# Patient Record
Sex: FEMALE | Race: WHITE | Employment: FULL TIME | URBAN - METROPOLITAN AREA
[De-identification: names, ages, dates, MRNs, and addresses within clinical notes are randomized per-mention and may not be internally consistent; named-entity substitution may affect disease eponyms.]

---

## 2017-05-23 ENCOUNTER — HOSPITAL ENCOUNTER (OUTPATIENT)
Age: 27
Discharge: HOME | End: 2017-05-23
Payer: COMMERCIAL

## 2017-05-23 ENCOUNTER — HOSPITAL ENCOUNTER (OUTPATIENT)
Dept: CT IMAGING | Age: 27
Discharge: HOME | End: 2017-05-23
Payer: COMMERCIAL

## 2017-05-23 DIAGNOSIS — Z86.19 HISTORY OF SEPSIS: ICD-10-CM

## 2017-05-23 DIAGNOSIS — R10.84 GENERALIZED ABDOMINAL PAIN: ICD-10-CM

## 2017-05-23 LAB
ALBUMIN SERPL-MCNC: 4.3 G/DL (ref 3.5–5.2)
ALBUMIN/GLOBULIN RATIO: ABNORMAL (ref 1–2.5)
ALP BLD-CCNC: 130 U/L (ref 35–104)
ALT SERPL-CCNC: 15 U/L (ref 5–33)
AMYLASE: 64 U/L (ref 28–100)
ANION GAP SERPL CALCULATED.3IONS-SCNC: 15 MMOL/L (ref 9–17)
AST SERPL-CCNC: 14 U/L
BILIRUB SERPL-MCNC: 0.4 MG/DL (ref 0.3–1.2)
BUN BLDV-MCNC: 12 MG/DL (ref 6–20)
BUN/CREAT BLD: ABNORMAL (ref 9–20)
CALCIUM SERPL-MCNC: 9 MG/DL (ref 8.6–10.4)
CHLORIDE BLD-SCNC: 98 MMOL/L (ref 98–107)
CO2: 23 MMOL/L (ref 20–31)
CREAT SERPL-MCNC: 0.79 MG/DL (ref 0.5–0.9)
GFR AFRICAN AMERICAN: >60 ML/MIN
GFR NON-AFRICAN AMERICAN: >60 ML/MIN
GFR SERPL CREATININE-BSD FRML MDRD: ABNORMAL ML/MIN/{1.73_M2}
GFR SERPL CREATININE-BSD FRML MDRD: ABNORMAL ML/MIN/{1.73_M2}
GLUCOSE BLD-MCNC: 113 MG/DL (ref 70–99)
HCT VFR BLD CALC: 39.4 % (ref 36–46)
HEMOGLOBIN: 13.1 G/DL (ref 12–16)
LIPASE: 68 U/L (ref 13–60)
MCH RBC QN AUTO: 27.8 PG (ref 26–34)
MCHC RBC AUTO-ENTMCNC: 33.3 G/DL (ref 31–37)
MCV RBC AUTO: 83.5 FL (ref 80–100)
PDW BLD-RTO: 13.4 % (ref 11.5–14.9)
PLATELET # BLD: 198 K/UL (ref 150–450)
PMV BLD AUTO: 7.4 FL (ref 6–12)
POTASSIUM SERPL-SCNC: 3.6 MMOL/L (ref 3.7–5.3)
RBC # BLD: 4.72 M/UL (ref 4–5.2)
SODIUM BLD-SCNC: 136 MMOL/L (ref 135–144)
TOTAL PROTEIN: 7.5 G/DL (ref 6.4–8.3)
WBC # BLD: 9 K/UL (ref 3.5–11)

## 2017-05-23 PROCEDURE — 85027 COMPLETE CBC AUTOMATED: CPT

## 2017-05-23 PROCEDURE — 6360000004 HC RX CONTRAST MEDICATION: Performed by: FAMILY MEDICINE

## 2017-05-23 PROCEDURE — 80053 COMPREHEN METABOLIC PANEL: CPT

## 2017-05-23 PROCEDURE — 2580000003 HC RX 258: Performed by: FAMILY MEDICINE

## 2017-05-23 PROCEDURE — 74177 CT ABD & PELVIS W/CONTRAST: CPT

## 2017-05-23 PROCEDURE — 83690 ASSAY OF LIPASE: CPT

## 2017-05-23 PROCEDURE — 82150 ASSAY OF AMYLASE: CPT

## 2017-05-23 PROCEDURE — 36415 COLL VENOUS BLD VENIPUNCTURE: CPT

## 2017-05-23 RX ORDER — 0.9 % SODIUM CHLORIDE 0.9 %
100 INTRAVENOUS SOLUTION INTRAVENOUS ONCE
Status: COMPLETED | OUTPATIENT
Start: 2017-05-23 | End: 2017-05-23

## 2017-05-23 RX ORDER — SODIUM CHLORIDE 0.9 % (FLUSH) 0.9 %
10 SYRINGE (ML) INJECTION PRN
Status: DISCONTINUED | OUTPATIENT
Start: 2017-05-23 | End: 2017-05-26 | Stop reason: HOSPADM

## 2017-05-23 RX ADMIN — Medication 10 ML: at 14:56

## 2017-05-23 RX ADMIN — SODIUM CHLORIDE 100 ML: 9 INJECTION, SOLUTION INTRAVENOUS at 14:56

## 2017-05-23 RX ADMIN — IOHEXOL 50 ML: 240 INJECTION, SOLUTION INTRATHECAL; INTRAVASCULAR; INTRAVENOUS; ORAL at 14:56

## 2017-05-23 RX ADMIN — IOVERSOL 130 ML: 741 INJECTION INTRA-ARTERIAL; INTRAVENOUS at 14:56

## 2017-06-15 PROBLEM — E87.6 HYPOKALEMIA: Status: ACTIVE | Noted: 2017-06-15

## 2017-06-15 PROBLEM — R73.9 HYPERGLYCEMIA: Status: ACTIVE | Noted: 2017-06-15

## 2017-07-25 PROBLEM — M54.40 CHRONIC BILATERAL LOW BACK PAIN WITH SCIATICA: Status: ACTIVE | Noted: 2017-07-25

## 2017-07-25 PROBLEM — G89.29 CHRONIC BILATERAL LOW BACK PAIN WITH SCIATICA: Status: ACTIVE | Noted: 2017-07-25

## 2017-07-25 PROBLEM — R10.84 GENERALIZED ABDOMINAL PAIN: Status: ACTIVE | Noted: 2017-07-25

## 2017-08-03 ENCOUNTER — HOSPITAL ENCOUNTER (EMERGENCY)
Age: 27
Discharge: HOME | End: 2017-08-04
Attending: EMERGENCY MEDICINE
Payer: COMMERCIAL

## 2017-08-03 ENCOUNTER — APPOINTMENT (OUTPATIENT)
Dept: GENERAL RADIOLOGY | Age: 27
End: 2017-08-03
Payer: COMMERCIAL

## 2017-08-03 ENCOUNTER — APPOINTMENT (OUTPATIENT)
Dept: CT IMAGING | Age: 27
End: 2017-08-03
Payer: COMMERCIAL

## 2017-08-03 DIAGNOSIS — R51.9 ACUTE NONINTRACTABLE HEADACHE, UNSPECIFIED HEADACHE TYPE: Primary | ICD-10-CM

## 2017-08-03 DIAGNOSIS — R10.9 ABDOMINAL PAIN, UNSPECIFIED LOCATION: ICD-10-CM

## 2017-08-03 DIAGNOSIS — R07.9 CHEST PAIN, UNSPECIFIED TYPE: ICD-10-CM

## 2017-08-03 LAB
ABSOLUTE EOS #: 0.1 K/UL (ref 0–0.4)
ABSOLUTE LYMPH #: 2.8 K/UL (ref 1–4.8)
ABSOLUTE MONO #: 0.5 K/UL (ref 0.1–1.3)
ALBUMIN SERPL-MCNC: 4.2 G/DL (ref 3.5–5.2)
ALBUMIN/GLOBULIN RATIO: ABNORMAL (ref 1–2.5)
ALP BLD-CCNC: 118 U/L (ref 35–104)
ALT SERPL-CCNC: 19 U/L (ref 5–33)
ANION GAP SERPL CALCULATED.3IONS-SCNC: 14 MMOL/L (ref 9–17)
AST SERPL-CCNC: 15 U/L
BASOPHILS # BLD: 1 %
BASOPHILS ABSOLUTE: 0.1 K/UL (ref 0–0.2)
BILIRUB SERPL-MCNC: 0.22 MG/DL (ref 0.3–1.2)
BILIRUBIN DIRECT: <0.08 MG/DL
BILIRUBIN URINE: NEGATIVE
BILIRUBIN, INDIRECT: ABNORMAL MG/DL (ref 0–1)
BUN BLDV-MCNC: 17 MG/DL (ref 6–20)
BUN/CREAT BLD: NORMAL (ref 9–20)
CALCIUM SERPL-MCNC: 9.3 MG/DL (ref 8.6–10.4)
CHLORIDE BLD-SCNC: 99 MMOL/L (ref 98–107)
CO2: 26 MMOL/L (ref 20–31)
COLOR: YELLOW
COMMENT UA: NORMAL
CREAT SERPL-MCNC: 0.83 MG/DL (ref 0.5–0.9)
DIFFERENTIAL TYPE: NORMAL
EOSINOPHILS RELATIVE PERCENT: 2 %
GFR AFRICAN AMERICAN: >60 ML/MIN
GFR NON-AFRICAN AMERICAN: >60 ML/MIN
GFR SERPL CREATININE-BSD FRML MDRD: NORMAL ML/MIN/{1.73_M2}
GFR SERPL CREATININE-BSD FRML MDRD: NORMAL ML/MIN/{1.73_M2}
GLOBULIN: ABNORMAL G/DL (ref 1.5–3.8)
GLUCOSE BLD-MCNC: 92 MG/DL (ref 70–99)
GLUCOSE URINE: NEGATIVE
HCT VFR BLD CALC: 38.7 % (ref 36–46)
HEMOGLOBIN: 13.2 G/DL (ref 12–16)
KETONES, URINE: NEGATIVE
LEUKOCYTE ESTERASE, URINE: NEGATIVE
LIPASE: 40 U/L (ref 13–60)
LYMPHOCYTES # BLD: 33 %
MCH RBC QN AUTO: 28.5 PG (ref 26–34)
MCHC RBC AUTO-ENTMCNC: 34.1 G/DL (ref 31–37)
MCV RBC AUTO: 83.8 FL (ref 80–100)
MONOCYTES # BLD: 5 %
NITRITE, URINE: NEGATIVE
PDW BLD-RTO: 13.2 % (ref 11.5–14.9)
PH UA: 5.5 (ref 5–8)
PLATELET # BLD: 234 K/UL (ref 150–450)
PLATELET ESTIMATE: NORMAL
PMV BLD AUTO: 8.2 FL (ref 6–12)
POTASSIUM SERPL-SCNC: 4 MMOL/L (ref 3.7–5.3)
PROTEIN UA: NEGATIVE
RBC # BLD: 4.61 M/UL (ref 4–5.2)
RBC # BLD: NORMAL 10*6/UL
SEG NEUTROPHILS: 59 %
SEGMENTED NEUTROPHILS ABSOLUTE COUNT: 5.2 K/UL (ref 1.3–9.1)
SODIUM BLD-SCNC: 139 MMOL/L (ref 135–144)
SPECIFIC GRAVITY UA: 1.01 (ref 1–1.03)
TOTAL PROTEIN: 7.4 G/DL (ref 6.4–8.3)
TROPONIN INTERP: NORMAL
TROPONIN T: <0.03 NG/ML
TURBIDITY: CLEAR
URINE HGB: NEGATIVE
UROBILINOGEN, URINE: NORMAL
WBC # BLD: 8.7 K/UL (ref 3.5–11)
WBC # BLD: NORMAL 10*3/UL

## 2017-08-03 PROCEDURE — 81003 URINALYSIS AUTO W/O SCOPE: CPT

## 2017-08-03 PROCEDURE — 82945 GLUCOSE OTHER FLUID: CPT

## 2017-08-03 PROCEDURE — 89050 BODY FLUID CELL COUNT: CPT

## 2017-08-03 PROCEDURE — 62270 DX LMBR SPI PNXR: CPT

## 2017-08-03 PROCEDURE — 84157 ASSAY OF PROTEIN OTHER: CPT

## 2017-08-03 PROCEDURE — 71020 XR CHEST STANDARD TWO VW: CPT

## 2017-08-03 PROCEDURE — 80076 HEPATIC FUNCTION PANEL: CPT

## 2017-08-03 PROCEDURE — 70450 CT HEAD/BRAIN W/O DYE: CPT

## 2017-08-03 PROCEDURE — 87205 SMEAR GRAM STAIN: CPT

## 2017-08-03 PROCEDURE — 93005 ELECTROCARDIOGRAM TRACING: CPT

## 2017-08-03 PROCEDURE — 99285 EMERGENCY DEPT VISIT HI MDM: CPT

## 2017-08-03 PROCEDURE — 87070 CULTURE OTHR SPECIMN AEROBIC: CPT

## 2017-08-03 PROCEDURE — 84484 ASSAY OF TROPONIN QUANT: CPT

## 2017-08-03 PROCEDURE — 36415 COLL VENOUS BLD VENIPUNCTURE: CPT

## 2017-08-03 PROCEDURE — 85025 COMPLETE CBC W/AUTO DIFF WBC: CPT

## 2017-08-03 PROCEDURE — 87086 URINE CULTURE/COLONY COUNT: CPT

## 2017-08-03 PROCEDURE — 80048 BASIC METABOLIC PNL TOTAL CA: CPT

## 2017-08-03 PROCEDURE — 2580000003 HC RX 258: Performed by: EMERGENCY MEDICINE

## 2017-08-03 PROCEDURE — 74000 XR ABDOMEN LIMITED (KUB): CPT

## 2017-08-03 PROCEDURE — 96375 TX/PRO/DX INJ NEW DRUG ADDON: CPT

## 2017-08-03 PROCEDURE — 6360000002 HC RX W HCPCS: Performed by: EMERGENCY MEDICINE

## 2017-08-03 PROCEDURE — 83690 ASSAY OF LIPASE: CPT

## 2017-08-03 RX ORDER — METOCLOPRAMIDE HYDROCHLORIDE 5 MG/ML
10 INJECTION INTRAMUSCULAR; INTRAVENOUS ONCE
Status: COMPLETED | OUTPATIENT
Start: 2017-08-03 | End: 2017-08-03

## 2017-08-03 RX ORDER — DIPHENHYDRAMINE HYDROCHLORIDE 50 MG/ML
25 INJECTION INTRAMUSCULAR; INTRAVENOUS ONCE
Status: COMPLETED | OUTPATIENT
Start: 2017-08-03 | End: 2017-08-03

## 2017-08-03 RX ORDER — 0.9 % SODIUM CHLORIDE 0.9 %
1000 INTRAVENOUS SOLUTION INTRAVENOUS ONCE
Status: COMPLETED | OUTPATIENT
Start: 2017-08-03 | End: 2017-08-03

## 2017-08-03 RX ORDER — DEXAMETHASONE SODIUM PHOSPHATE 4 MG/ML
10 INJECTION, SOLUTION INTRA-ARTICULAR; INTRALESIONAL; INTRAMUSCULAR; INTRAVENOUS; SOFT TISSUE ONCE
Status: COMPLETED | OUTPATIENT
Start: 2017-08-03 | End: 2017-08-04

## 2017-08-03 RX ORDER — MORPHINE SULFATE 4 MG/ML
4 INJECTION, SOLUTION INTRAMUSCULAR; INTRAVENOUS ONCE
Status: COMPLETED | OUTPATIENT
Start: 2017-08-03 | End: 2017-08-03

## 2017-08-03 RX ADMIN — SODIUM CHLORIDE 1000 ML: 9 INJECTION, SOLUTION INTRAVENOUS at 21:40

## 2017-08-03 RX ADMIN — METOCLOPRAMIDE 10 MG: 5 INJECTION, SOLUTION INTRAMUSCULAR; INTRAVENOUS at 21:36

## 2017-08-03 RX ADMIN — MORPHINE SULFATE 4 MG: 4 INJECTION, SOLUTION INTRAMUSCULAR; INTRAVENOUS at 21:32

## 2017-08-03 RX ADMIN — DIPHENHYDRAMINE HYDROCHLORIDE 25 MG: 50 INJECTION, SOLUTION INTRAMUSCULAR; INTRAVENOUS at 21:36

## 2017-08-03 ASSESSMENT — PAIN SCALES - WONG BAKER: WONGBAKER_NUMERICALRESPONSE: 8

## 2017-08-03 ASSESSMENT — PAIN DESCRIPTION - PAIN TYPE: TYPE: ACUTE PAIN

## 2017-08-03 ASSESSMENT — PAIN DESCRIPTION - LOCATION: LOCATION: HEAD

## 2017-08-03 ASSESSMENT — PAIN SCALES - GENERAL
PAINLEVEL_OUTOF10: 9
PAINLEVEL_OUTOF10: 8

## 2017-08-04 VITALS
RESPIRATION RATE: 14 BRPM | OXYGEN SATURATION: 98 % | WEIGHT: 163 LBS | BODY MASS INDEX: 30 KG/M2 | SYSTOLIC BLOOD PRESSURE: 100 MMHG | DIASTOLIC BLOOD PRESSURE: 62 MMHG | HEART RATE: 103 BPM | TEMPERATURE: 98 F

## 2017-08-04 LAB
APPEARANCE CSF: COLORLESS
CULTURE: NORMAL
CULTURE: NORMAL
GLUCOSE, CSF: 55 MG/DL (ref 40–70)
Lab: NORMAL
PROTEIN CSF: 17.4 MG/DL (ref 15–45)
RBC CSF: 0 /MM3
SPECIMEN DESCRIPTION: NORMAL
SPECIMEN DESCRIPTION: NORMAL
STATUS: NORMAL
SUPERNAT COLOR CSF: CLEAR
TROPONIN INTERP: NORMAL
TROPONIN T: <0.03 NG/ML
TUBE NUMBER CSF: 3
VOLUME CSF: 4.5
WBC CSF: 1 /MM3
XANTHOCHROMIA: NORMAL

## 2017-08-04 PROCEDURE — 96365 THER/PROPH/DIAG IV INF INIT: CPT

## 2017-08-04 PROCEDURE — 6360000002 HC RX W HCPCS: Performed by: EMERGENCY MEDICINE

## 2017-08-04 PROCEDURE — 2580000003 HC RX 258: Performed by: EMERGENCY MEDICINE

## 2017-08-04 PROCEDURE — 96375 TX/PRO/DX INJ NEW DRUG ADDON: CPT

## 2017-08-04 RX ORDER — LORATADINE 10 MG/1
10 TABLET ORAL DAILY
Qty: 20 TABLET | Refills: 0 | Status: SHIPPED | OUTPATIENT
Start: 2017-08-04 | End: 2017-10-11 | Stop reason: ALTCHOICE

## 2017-08-04 RX ORDER — FAMOTIDINE 20 MG/1
20 TABLET, FILM COATED ORAL 2 TIMES DAILY
Qty: 20 TABLET | Refills: 0 | Status: SHIPPED | OUTPATIENT
Start: 2017-08-04 | End: 2017-08-29 | Stop reason: ALTCHOICE

## 2017-08-04 RX ORDER — METOCLOPRAMIDE 10 MG/1
10 TABLET ORAL 4 TIMES DAILY PRN
Qty: 30 TABLET | Refills: 0 | Status: SHIPPED | OUTPATIENT
Start: 2017-08-04 | End: 2017-08-29 | Stop reason: ALTCHOICE

## 2017-08-04 RX ADMIN — MEROPENEM 2 G: 1 INJECTION, POWDER, FOR SOLUTION INTRAVENOUS at 00:16

## 2017-08-04 RX ADMIN — DEXAMETHASONE SODIUM PHOSPHATE 10 MG: 4 INJECTION, SOLUTION INTRAMUSCULAR; INTRAVENOUS at 00:12

## 2017-08-05 ASSESSMENT — ENCOUNTER SYMPTOMS
CONSTIPATION: 0
BLOOD IN STOOL: 0
ABDOMINAL PAIN: 0
PHOTOPHOBIA: 1
DIARRHEA: 0
NAUSEA: 1
RHINORRHEA: 1
SHORTNESS OF BREATH: 0
VOMITING: 0
SORE THROAT: 0
COUGH: 0

## 2017-08-07 LAB
CULTURE: NORMAL
CULTURE: NORMAL
DIRECT EXAM: NORMAL
Lab: NORMAL
SPECIMEN DESCRIPTION: NORMAL
SPECIMEN DESCRIPTION: NORMAL
STATUS: NORMAL

## 2017-08-23 LAB
EKG ATRIAL RATE: 78 BPM
EKG P AXIS: 38 DEGREES
EKG P-R INTERVAL: 162 MS
EKG Q-T INTERVAL: 372 MS
EKG QRS DURATION: 86 MS
EKG QTC CALCULATION (BAZETT): 424 MS
EKG R AXIS: 12 DEGREES
EKG T AXIS: 30 DEGREES
EKG VENTRICULAR RATE: 78 BPM

## 2017-08-29 PROBLEM — M51.9 LUMBAR DISC DISEASE: Status: ACTIVE | Noted: 2017-08-29

## 2017-09-25 ENCOUNTER — HOSPITAL ENCOUNTER (OUTPATIENT)
Dept: ULTRASOUND IMAGING | Age: 27
Discharge: HOME | End: 2017-09-25
Payer: COMMERCIAL

## 2017-09-25 DIAGNOSIS — R10.84 GENERALIZED ABDOMINAL PAIN: ICD-10-CM

## 2017-09-25 PROCEDURE — 76705 ECHO EXAM OF ABDOMEN: CPT

## 2017-10-11 PROBLEM — R51.9 CHRONIC NONINTRACTABLE HEADACHE: Status: ACTIVE | Noted: 2017-10-11

## 2017-10-11 PROBLEM — G89.29 CHRONIC NONINTRACTABLE HEADACHE: Status: ACTIVE | Noted: 2017-10-11

## 2017-10-19 ENCOUNTER — HOSPITAL ENCOUNTER (OUTPATIENT)
Age: 27
Discharge: HOME | End: 2017-10-19
Payer: COMMERCIAL

## 2017-10-19 ENCOUNTER — HOSPITAL ENCOUNTER (OUTPATIENT)
Dept: MRI IMAGING | Age: 27
Discharge: HOME | End: 2017-10-19
Payer: COMMERCIAL

## 2017-10-19 DIAGNOSIS — G89.29 CHRONIC BILATERAL LOW BACK PAIN WITH SCIATICA, SCIATICA LATERALITY UNSPECIFIED: ICD-10-CM

## 2017-10-19 DIAGNOSIS — Z13.220 SCREENING CHOLESTEROL LEVEL: ICD-10-CM

## 2017-10-19 DIAGNOSIS — M54.16 LUMBAR RADICULOPATHY: ICD-10-CM

## 2017-10-19 DIAGNOSIS — M51.9 LUMBAR DISC DISEASE: ICD-10-CM

## 2017-10-19 DIAGNOSIS — M08.00 JUVENILE RHEUMATOID ARTHRITIS (HCC): ICD-10-CM

## 2017-10-19 DIAGNOSIS — R73.9 HYPERGLYCEMIA: ICD-10-CM

## 2017-10-19 DIAGNOSIS — M54.40 CHRONIC BILATERAL LOW BACK PAIN WITH SCIATICA, SCIATICA LATERALITY UNSPECIFIED: ICD-10-CM

## 2017-10-19 DIAGNOSIS — E87.6 HYPOKALEMIA: ICD-10-CM

## 2017-10-19 LAB
ALBUMIN SERPL-MCNC: 4.5 G/DL (ref 3.5–5.2)
ALBUMIN/GLOBULIN RATIO: NORMAL (ref 1–2.5)
ALP BLD-CCNC: 99 U/L (ref 35–104)
ALT SERPL-CCNC: 13 U/L (ref 5–33)
ANION GAP SERPL CALCULATED.3IONS-SCNC: 11 MMOL/L (ref 9–17)
AST SERPL-CCNC: 12 U/L
BILIRUB SERPL-MCNC: 0.33 MG/DL (ref 0.3–1.2)
BUN BLDV-MCNC: 16 MG/DL (ref 6–20)
BUN/CREAT BLD: NORMAL (ref 9–20)
CALCIUM SERPL-MCNC: 9.2 MG/DL (ref 8.6–10.4)
CHLORIDE BLD-SCNC: 104 MMOL/L (ref 98–107)
CHOLESTEROL/HDL RATIO: 3.4
CHOLESTEROL: 153 MG/DL
CO2: 24 MMOL/L (ref 20–31)
CREAT SERPL-MCNC: 0.85 MG/DL (ref 0.5–0.9)
GFR AFRICAN AMERICAN: >60 ML/MIN
GFR NON-AFRICAN AMERICAN: >60 ML/MIN
GFR SERPL CREATININE-BSD FRML MDRD: NORMAL ML/MIN/{1.73_M2}
GFR SERPL CREATININE-BSD FRML MDRD: NORMAL ML/MIN/{1.73_M2}
GLUCOSE BLD-MCNC: 96 MG/DL (ref 70–99)
HCT VFR BLD CALC: 42.7 % (ref 36–46)
HDLC SERPL-MCNC: 45 MG/DL
HEMOGLOBIN: 14.7 G/DL (ref 12–16)
LDL CHOLESTEROL: 87 MG/DL (ref 0–130)
MCH RBC QN AUTO: 29.1 PG (ref 26–34)
MCHC RBC AUTO-ENTMCNC: 34.4 G/DL (ref 31–37)
MCV RBC AUTO: 84.4 FL (ref 80–100)
PDW BLD-RTO: 13.6 % (ref 11.5–14.9)
PLATELET # BLD: 204 K/UL (ref 150–450)
PMV BLD AUTO: 7.6 FL (ref 6–12)
POTASSIUM SERPL-SCNC: 4.3 MMOL/L (ref 3.7–5.3)
RBC # BLD: 5.06 M/UL (ref 4–5.2)
SODIUM BLD-SCNC: 139 MMOL/L (ref 135–144)
TOTAL PROTEIN: 7.5 G/DL (ref 6.4–8.3)
TRIGL SERPL-MCNC: 106 MG/DL
VLDLC SERPL CALC-MCNC: NORMAL MG/DL (ref 1–30)
WBC # BLD: 7 K/UL (ref 3.5–11)

## 2017-10-19 PROCEDURE — 72148 MRI LUMBAR SPINE W/O DYE: CPT

## 2017-10-19 PROCEDURE — 80061 LIPID PANEL: CPT

## 2017-10-19 PROCEDURE — 85027 COMPLETE CBC AUTOMATED: CPT

## 2017-10-19 PROCEDURE — 80053 COMPREHEN METABOLIC PANEL: CPT

## 2017-10-19 PROCEDURE — 83036 HEMOGLOBIN GLYCOSYLATED A1C: CPT

## 2017-10-19 PROCEDURE — 36415 COLL VENOUS BLD VENIPUNCTURE: CPT

## 2017-10-20 LAB
ESTIMATED AVERAGE GLUCOSE: 91 MG/DL
HBA1C MFR BLD: 4.8 % (ref 4–6)

## 2018-01-11 PROBLEM — E87.6 HYPOKALEMIA: Status: RESOLVED | Noted: 2017-06-15 | Resolved: 2018-01-11

## 2018-02-22 ENCOUNTER — HOSPITAL ENCOUNTER (EMERGENCY)
Age: 28
Discharge: HOME | End: 2018-02-22
Attending: EMERGENCY MEDICINE
Payer: COMMERCIAL

## 2018-02-22 ENCOUNTER — APPOINTMENT (OUTPATIENT)
Dept: GENERAL RADIOLOGY | Age: 28
End: 2018-02-22
Payer: COMMERCIAL

## 2018-02-22 VITALS
DIASTOLIC BLOOD PRESSURE: 62 MMHG | RESPIRATION RATE: 16 BRPM | TEMPERATURE: 99.4 F | SYSTOLIC BLOOD PRESSURE: 102 MMHG | HEART RATE: 107 BPM | BODY MASS INDEX: 29.23 KG/M2 | OXYGEN SATURATION: 95 % | WEIGHT: 165 LBS | HEIGHT: 63 IN

## 2018-02-22 DIAGNOSIS — F17.200 SMOKING ADDICTION: ICD-10-CM

## 2018-02-22 DIAGNOSIS — N39.0 URINARY TRACT INFECTION WITH HEMATURIA, SITE UNSPECIFIED: Primary | ICD-10-CM

## 2018-02-22 DIAGNOSIS — J06.9 VIRAL UPPER RESPIRATORY TRACT INFECTION: ICD-10-CM

## 2018-02-22 DIAGNOSIS — R05.9 COUGH: ICD-10-CM

## 2018-02-22 DIAGNOSIS — R50.81 FEVER IN OTHER DISEASES: ICD-10-CM

## 2018-02-22 DIAGNOSIS — R31.9 URINARY TRACT INFECTION WITH HEMATURIA, SITE UNSPECIFIED: Primary | ICD-10-CM

## 2018-02-22 LAB
-: ABNORMAL
ABSOLUTE EOS #: 0 K/UL (ref 0–0.4)
ABSOLUTE IMMATURE GRANULOCYTE: ABNORMAL K/UL (ref 0–0.3)
ABSOLUTE LYMPH #: 0.9 K/UL (ref 1–4.8)
ABSOLUTE MONO #: 0.8 K/UL (ref 0.1–1.3)
ALBUMIN SERPL-MCNC: 4 G/DL (ref 3.5–5.2)
ALBUMIN/GLOBULIN RATIO: ABNORMAL (ref 1–2.5)
ALP BLD-CCNC: 113 U/L (ref 35–104)
ALT SERPL-CCNC: 10 U/L (ref 5–33)
AMORPHOUS: ABNORMAL
ANION GAP SERPL CALCULATED.3IONS-SCNC: 15 MMOL/L (ref 9–17)
AST SERPL-CCNC: 11 U/L
BACTERIA: ABNORMAL
BASOPHILS # BLD: 0 % (ref 0–2)
BASOPHILS ABSOLUTE: 0.1 K/UL (ref 0–0.2)
BILIRUB SERPL-MCNC: 0.72 MG/DL (ref 0.3–1.2)
BILIRUBIN URINE: NEGATIVE
BUN BLDV-MCNC: 13 MG/DL (ref 6–20)
BUN/CREAT BLD: ABNORMAL (ref 9–20)
CALCIUM SERPL-MCNC: 9.1 MG/DL (ref 8.6–10.4)
CASTS UA: ABNORMAL /LPF
CHLORIDE BLD-SCNC: 97 MMOL/L (ref 98–107)
CO2: 24 MMOL/L (ref 20–31)
COLOR: ABNORMAL
COMMENT UA: ABNORMAL
CREAT SERPL-MCNC: 1.03 MG/DL (ref 0.5–0.9)
CRYSTALS, UA: ABNORMAL /HPF
DIFFERENTIAL TYPE: ABNORMAL
DIRECT EXAM: NORMAL
EKG ATRIAL RATE: 104 BPM
EKG P AXIS: 33 DEGREES
EKG P-R INTERVAL: 160 MS
EKG Q-T INTERVAL: 344 MS
EKG QRS DURATION: 82 MS
EKG QTC CALCULATION (BAZETT): 452 MS
EKG R AXIS: 12 DEGREES
EKG T AXIS: 31 DEGREES
EKG VENTRICULAR RATE: 104 BPM
EOSINOPHILS RELATIVE PERCENT: 0 % (ref 0–4)
EPITHELIAL CELLS UA: ABNORMAL /HPF
GFR AFRICAN AMERICAN: >60 ML/MIN
GFR NON-AFRICAN AMERICAN: >60 ML/MIN
GFR SERPL CREATININE-BSD FRML MDRD: ABNORMAL ML/MIN/{1.73_M2}
GFR SERPL CREATININE-BSD FRML MDRD: ABNORMAL ML/MIN/{1.73_M2}
GLUCOSE BLD-MCNC: 107 MG/DL (ref 70–99)
GLUCOSE URINE: NEGATIVE
HCG(URINE) PREGNANCY TEST: NEGATIVE
HCT VFR BLD CALC: 40.4 % (ref 36–46)
HEMOGLOBIN: 13.6 G/DL (ref 12–16)
IMMATURE GRANULOCYTES: ABNORMAL %
KETONES, URINE: NEGATIVE
LACTIC ACID, SEPSIS WHOLE BLOOD: NORMAL MMOL/L (ref 0.5–1.9)
LACTIC ACID, SEPSIS: 0.8 MMOL/L (ref 0.5–1.9)
LEUKOCYTE ESTERASE, URINE: ABNORMAL
LYMPHOCYTES # BLD: 7 % (ref 24–44)
Lab: NORMAL
MCH RBC QN AUTO: 29 PG (ref 26–34)
MCHC RBC AUTO-ENTMCNC: 33.7 G/DL (ref 31–37)
MCV RBC AUTO: 86.1 FL (ref 80–100)
MONOCYTES # BLD: 7 % (ref 1–7)
MUCUS: ABNORMAL
NITRITE, URINE: POSITIVE
NRBC AUTOMATED: ABNORMAL PER 100 WBC
OTHER OBSERVATIONS UA: ABNORMAL
PDW BLD-RTO: 13.3 % (ref 11.5–14.9)
PH UA: 7.5 (ref 5–8)
PLATELET # BLD: 220 K/UL (ref 150–450)
PLATELET ESTIMATE: ABNORMAL
PMV BLD AUTO: 7.5 FL (ref 6–12)
POTASSIUM SERPL-SCNC: 3.8 MMOL/L (ref 3.7–5.3)
PROTEIN UA: ABNORMAL
RBC # BLD: 4.69 M/UL (ref 4–5.2)
RBC # BLD: ABNORMAL 10*6/UL
RBC UA: ABNORMAL /HPF
RENAL EPITHELIAL, UA: ABNORMAL /HPF
SEG NEUTROPHILS: 86 % (ref 36–66)
SEGMENTED NEUTROPHILS ABSOLUTE COUNT: 11 K/UL (ref 1.3–9.1)
SODIUM BLD-SCNC: 136 MMOL/L (ref 135–144)
SPECIFIC GRAVITY UA: 1.02 (ref 1–1.03)
SPECIMEN DESCRIPTION: NORMAL
SPECIMEN DESCRIPTION: NORMAL
STATUS: NORMAL
TOTAL PROTEIN: 7.8 G/DL (ref 6.4–8.3)
TRICHOMONAS: ABNORMAL
TURBIDITY: ABNORMAL
URINE HGB: ABNORMAL
UROBILINOGEN, URINE: NORMAL
WBC # BLD: 12.8 K/UL (ref 3.5–11)
WBC # BLD: ABNORMAL 10*3/UL
WBC UA: ABNORMAL /HPF
YEAST: ABNORMAL

## 2018-02-22 PROCEDURE — 87804 INFLUENZA ASSAY W/OPTIC: CPT

## 2018-02-22 PROCEDURE — 85025 COMPLETE CBC W/AUTO DIFF WBC: CPT

## 2018-02-22 PROCEDURE — 99285 EMERGENCY DEPT VISIT HI MDM: CPT

## 2018-02-22 PROCEDURE — 81001 URINALYSIS AUTO W/SCOPE: CPT

## 2018-02-22 PROCEDURE — 83605 ASSAY OF LACTIC ACID: CPT

## 2018-02-22 PROCEDURE — 87186 SC STD MICRODIL/AGAR DIL: CPT

## 2018-02-22 PROCEDURE — 71046 X-RAY EXAM CHEST 2 VIEWS: CPT

## 2018-02-22 PROCEDURE — 6370000000 HC RX 637 (ALT 250 FOR IP): Performed by: EMERGENCY MEDICINE

## 2018-02-22 PROCEDURE — 96375 TX/PRO/DX INJ NEW DRUG ADDON: CPT

## 2018-02-22 PROCEDURE — 6360000002 HC RX W HCPCS: Performed by: EMERGENCY MEDICINE

## 2018-02-22 PROCEDURE — 2580000003 HC RX 258: Performed by: EMERGENCY MEDICINE

## 2018-02-22 PROCEDURE — 84703 CHORIONIC GONADOTROPIN ASSAY: CPT

## 2018-02-22 PROCEDURE — 96374 THER/PROPH/DIAG INJ IV PUSH: CPT

## 2018-02-22 PROCEDURE — 87077 CULTURE AEROBIC IDENTIFY: CPT

## 2018-02-22 PROCEDURE — 87086 URINE CULTURE/COLONY COUNT: CPT

## 2018-02-22 PROCEDURE — 36415 COLL VENOUS BLD VENIPUNCTURE: CPT

## 2018-02-22 PROCEDURE — 87205 SMEAR GRAM STAIN: CPT

## 2018-02-22 PROCEDURE — 87040 BLOOD CULTURE FOR BACTERIA: CPT

## 2018-02-22 PROCEDURE — 93005 ELECTROCARDIOGRAM TRACING: CPT

## 2018-02-22 PROCEDURE — 80053 COMPREHEN METABOLIC PANEL: CPT

## 2018-02-22 RX ORDER — IBUPROFEN 800 MG/1
800 TABLET ORAL EVERY 8 HOURS PRN
Qty: 30 TABLET | Refills: 0 | Status: SHIPPED | OUTPATIENT
Start: 2018-02-22 | End: 2018-07-02 | Stop reason: ALTCHOICE

## 2018-02-22 RX ORDER — SODIUM CHLORIDE 0.9 % (FLUSH) 0.9 %
10 SYRINGE (ML) INJECTION PRN
Status: DISCONTINUED | OUTPATIENT
Start: 2018-02-22 | End: 2018-02-22 | Stop reason: HOSPADM

## 2018-02-22 RX ORDER — 0.9 % SODIUM CHLORIDE 0.9 %
1000 INTRAVENOUS SOLUTION INTRAVENOUS ONCE
Status: COMPLETED | OUTPATIENT
Start: 2018-02-22 | End: 2018-02-22

## 2018-02-22 RX ORDER — SULFAMETHOXAZOLE AND TRIMETHOPRIM 800; 160 MG/1; MG/1
1 TABLET ORAL 2 TIMES DAILY
Qty: 14 TABLET | Refills: 0 | Status: SHIPPED | OUTPATIENT
Start: 2018-02-22 | End: 2018-03-01

## 2018-02-22 RX ORDER — ONDANSETRON 4 MG/1
4 TABLET, ORALLY DISINTEGRATING ORAL ONCE
Status: DISCONTINUED | OUTPATIENT
Start: 2018-02-22 | End: 2018-02-22

## 2018-02-22 RX ORDER — DIPHENHYDRAMINE HYDROCHLORIDE 50 MG/ML
50 INJECTION INTRAMUSCULAR; INTRAVENOUS
Status: DISCONTINUED | OUTPATIENT
Start: 2018-02-22 | End: 2018-02-22 | Stop reason: HOSPADM

## 2018-02-22 RX ORDER — ONDANSETRON 2 MG/ML
4 INJECTION INTRAMUSCULAR; INTRAVENOUS ONCE
Status: COMPLETED | OUTPATIENT
Start: 2018-02-22 | End: 2018-02-22

## 2018-02-22 RX ORDER — CEPHALEXIN 500 MG/1
500 CAPSULE ORAL 4 TIMES DAILY
Qty: 28 CAPSULE | Refills: 0 | Status: SHIPPED | OUTPATIENT
Start: 2018-02-22 | End: 2018-02-22 | Stop reason: ALTCHOICE

## 2018-02-22 RX ORDER — AZITHROMYCIN 250 MG/1
500 TABLET, FILM COATED ORAL ONCE
Status: COMPLETED | OUTPATIENT
Start: 2018-02-22 | End: 2018-02-22

## 2018-02-22 RX ORDER — 0.9 % SODIUM CHLORIDE 0.9 %
1000 INTRAVENOUS SOLUTION INTRAVENOUS ONCE
Status: DISCONTINUED | OUTPATIENT
Start: 2018-02-22 | End: 2018-02-22 | Stop reason: HOSPADM

## 2018-02-22 RX ORDER — SODIUM CHLORIDE 0.9 % (FLUSH) 0.9 %
10 SYRINGE (ML) INJECTION EVERY 12 HOURS SCHEDULED
Status: DISCONTINUED | OUTPATIENT
Start: 2018-02-22 | End: 2018-02-22 | Stop reason: HOSPADM

## 2018-02-22 RX ORDER — ACETAMINOPHEN 325 MG/1
650 TABLET ORAL ONCE
Status: COMPLETED | OUTPATIENT
Start: 2018-02-22 | End: 2018-02-22

## 2018-02-22 RX ORDER — KETOROLAC TROMETHAMINE 30 MG/ML
30 INJECTION, SOLUTION INTRAMUSCULAR; INTRAVENOUS ONCE
Status: COMPLETED | OUTPATIENT
Start: 2018-02-22 | End: 2018-02-22

## 2018-02-22 RX ADMIN — AZITHROMYCIN 500 MG: 250 TABLET, FILM COATED ORAL at 17:25

## 2018-02-22 RX ADMIN — CEFTRIAXONE SODIUM 1 G: 1 INJECTION, POWDER, FOR SOLUTION INTRAMUSCULAR; INTRAVENOUS at 17:34

## 2018-02-22 RX ADMIN — KETOROLAC TROMETHAMINE 30 MG: 30 INJECTION, SOLUTION INTRAMUSCULAR at 19:06

## 2018-02-22 RX ADMIN — ACETAMINOPHEN 650 MG: 325 TABLET, FILM COATED ORAL at 17:25

## 2018-02-22 RX ADMIN — SODIUM CHLORIDE 1000 ML: 9 INJECTION, SOLUTION INTRAVENOUS at 19:10

## 2018-02-22 RX ADMIN — SODIUM CHLORIDE 1000 ML: 9 INJECTION, SOLUTION INTRAVENOUS at 17:34

## 2018-02-22 RX ADMIN — ONDANSETRON 4 MG: 2 INJECTION INTRAMUSCULAR; INTRAVENOUS at 17:25

## 2018-02-22 ASSESSMENT — PAIN DESCRIPTION - PAIN TYPE
TYPE: ACUTE PAIN
TYPE: ACUTE PAIN

## 2018-02-22 ASSESSMENT — ENCOUNTER SYMPTOMS
BACK PAIN: 0
COUGH: 1
DIARRHEA: 0
SHORTNESS OF BREATH: 1
SORE THROAT: 0
EYE PAIN: 0
NAUSEA: 1
ABDOMINAL PAIN: 0
VOMITING: 1

## 2018-02-22 ASSESSMENT — PAIN DESCRIPTION - ORIENTATION: ORIENTATION_2: LEFT;PROXIMAL

## 2018-02-22 ASSESSMENT — PAIN DESCRIPTION - LOCATION
LOCATION: HEAD
LOCATION_2: CHEST

## 2018-02-22 ASSESSMENT — PAIN SCALES - GENERAL
PAINLEVEL_OUTOF10: 7
PAINLEVEL_OUTOF10: 8
PAINLEVEL_OUTOF10: 9
PAINLEVEL_OUTOF10: 8

## 2018-02-22 ASSESSMENT — PAIN DESCRIPTION - FREQUENCY: FREQUENCY: CONTINUOUS

## 2018-02-22 ASSESSMENT — PAIN DESCRIPTION - INTENSITY: RATING_2: 6

## 2018-02-22 ASSESSMENT — PAIN DESCRIPTION - DESCRIPTORS
DESCRIPTORS: THROBBING
DESCRIPTORS_2: TIGHTNESS

## 2018-02-22 NOTE — ED PROVIDER NOTES
Patient acknowledges plan, voices understanding of it and is in agreement with it. Patient's request Bactrim as Keflex due to allergy to Ceclor. CRITICAL CARE:   The patient admits to smoking. 3 minutes of time was spent discussing how this can worsen underlying breathing problems, COPD hypertension and heart disease or cause them to develop. CONSULTS:  None      FINAL IMPRESSION      1. Urinary tract infection with hematuria, site unspecified    2. Viral upper respiratory tract infection    3. Cough    4. Smoking addiction    5.  Fever in other diseases          DISPOSITION/PLAN:  DISCHARGE    PATIENT REFERRED TO:  Carlos Butler [address]  [address] [facility] [address]  1301 [facility] 264  [phone]    Call in 1 day      [facility] ED  [facility] [address] [phone]  Go to   If symptoms worsen, As needed      DISCHARGE MEDICATIONS:  New Prescriptions    IBUPROFEN (ADVIL;MOTRIN) 800 MG TABLET    Take 1 tablet by mouth every 8 hours as needed for Pain    SULFAMETHOXAZOLE-TRIMETHOPRIM (BACTRIM DS) 800-160 MG PER TABLET    Take 1 tablet by mouth 2 times daily for 7 days       (Please note that portions of this note were completed with a voice recognition program.  Efforts were made to edit the dictations but occasionally words are mis-transcribed.)    Dayna Jones MD  Attending Emergency Physician            Dayna Jones MD  02/22/18 2014

## 2018-02-23 NOTE — ED NOTES
Pt has some concerns regarding chest pain she has had Dr Teressa Oconnor notified , new orders for EKG at this time      Arneta Gaucher, SIMBA  02/22/18 3848

## 2018-02-24 LAB
CULTURE: ABNORMAL
CULTURE: ABNORMAL
Lab: ABNORMAL
ORGANISM: ABNORMAL
SPECIMEN DESCRIPTION: ABNORMAL
SPECIMEN DESCRIPTION: ABNORMAL
STATUS: ABNORMAL

## 2018-02-25 LAB
CULTURE: ABNORMAL
Lab: ABNORMAL
ORGANISM: ABNORMAL
SPECIMEN DESCRIPTION: ABNORMAL
STATUS: ABNORMAL

## 2018-02-28 LAB
CULTURE: NORMAL
CULTURE: NORMAL
Lab: NORMAL
SPECIMEN DESCRIPTION: NORMAL
STATUS: NORMAL

## 2018-03-12 ENCOUNTER — APPOINTMENT (OUTPATIENT)
Dept: ULTRASOUND IMAGING | Age: 28
DRG: 263 | End: 2018-03-12
Payer: COMMERCIAL

## 2018-03-12 ENCOUNTER — HOSPITAL ENCOUNTER (INPATIENT)
Age: 28
LOS: 4 days | Discharge: HOME | DRG: 263 | End: 2018-03-17
Attending: EMERGENCY MEDICINE | Admitting: FAMILY MEDICINE
Payer: COMMERCIAL

## 2018-03-12 ENCOUNTER — APPOINTMENT (OUTPATIENT)
Dept: GENERAL RADIOLOGY | Age: 28
DRG: 263 | End: 2018-03-12
Payer: COMMERCIAL

## 2018-03-12 DIAGNOSIS — R10.11 RIGHT UPPER QUADRANT ABDOMINAL PAIN: Primary | ICD-10-CM

## 2018-03-12 DIAGNOSIS — R79.89 ELEVATED LFTS: ICD-10-CM

## 2018-03-12 DIAGNOSIS — K81.9 CHOLECYSTITIS: ICD-10-CM

## 2018-03-12 PROBLEM — R74.8 LIVER ENZYME ELEVATION: Status: ACTIVE | Noted: 2018-03-12

## 2018-03-12 LAB
ABSOLUTE EOS #: 0 K/UL (ref 0–0.4)
ABSOLUTE IMMATURE GRANULOCYTE: ABNORMAL K/UL (ref 0–0.3)
ABSOLUTE LYMPH #: 1.1 K/UL (ref 1–4.8)
ABSOLUTE MONO #: 0.6 K/UL (ref 0.1–1.3)
ALBUMIN SERPL-MCNC: 4.1 G/DL (ref 3.5–5.2)
ALBUMIN/GLOBULIN RATIO: ABNORMAL (ref 1–2.5)
ALP BLD-CCNC: 238 U/L (ref 35–104)
ALT SERPL-CCNC: 143 U/L (ref 5–33)
ANION GAP SERPL CALCULATED.3IONS-SCNC: 12 MMOL/L (ref 9–17)
AST SERPL-CCNC: 232 U/L
BASOPHILS # BLD: 0 % (ref 0–2)
BASOPHILS ABSOLUTE: 0 K/UL (ref 0–0.2)
BILIRUB SERPL-MCNC: 1.17 MG/DL (ref 0.3–1.2)
BUN BLDV-MCNC: 11 MG/DL (ref 6–20)
BUN/CREAT BLD: ABNORMAL (ref 9–20)
CALCIUM SERPL-MCNC: 9.6 MG/DL (ref 8.6–10.4)
CHLORIDE BLD-SCNC: 99 MMOL/L (ref 98–107)
CO2: 27 MMOL/L (ref 20–31)
CREAT SERPL-MCNC: 0.84 MG/DL (ref 0.5–0.9)
DIFFERENTIAL TYPE: ABNORMAL
EKG ATRIAL RATE: 54 BPM
EKG P AXIS: 45 DEGREES
EKG P-R INTERVAL: 134 MS
EKG Q-T INTERVAL: 422 MS
EKG QRS DURATION: 78 MS
EKG QTC CALCULATION (BAZETT): 400 MS
EKG R AXIS: 38 DEGREES
EKG T AXIS: 60 DEGREES
EKG VENTRICULAR RATE: 54 BPM
EOSINOPHILS RELATIVE PERCENT: 1 % (ref 0–4)
GFR AFRICAN AMERICAN: >60 ML/MIN
GFR NON-AFRICAN AMERICAN: >60 ML/MIN
GFR SERPL CREATININE-BSD FRML MDRD: ABNORMAL ML/MIN/{1.73_M2}
GFR SERPL CREATININE-BSD FRML MDRD: ABNORMAL ML/MIN/{1.73_M2}
GLUCOSE BLD-MCNC: 119 MG/DL (ref 70–99)
HCT VFR BLD CALC: 38.5 % (ref 36–46)
HEMOGLOBIN: 12.8 G/DL (ref 12–16)
IMMATURE GRANULOCYTES: ABNORMAL %
LACTIC ACID, WHOLE BLOOD: NORMAL MMOL/L (ref 0.7–2.1)
LACTIC ACID: 1 MMOL/L (ref 0.5–2.2)
LIPASE: 30 U/L (ref 13–60)
LYMPHOCYTES # BLD: 12 % (ref 24–44)
MCH RBC QN AUTO: 28.2 PG (ref 26–34)
MCHC RBC AUTO-ENTMCNC: 33.3 G/DL (ref 31–37)
MCV RBC AUTO: 84.7 FL (ref 80–100)
MONOCYTES # BLD: 7 % (ref 1–7)
NRBC AUTOMATED: ABNORMAL PER 100 WBC
PDW BLD-RTO: 14.2 % (ref 11.5–14.9)
PLATELET # BLD: 224 K/UL (ref 150–450)
PLATELET ESTIMATE: ABNORMAL
PMV BLD AUTO: 7.4 FL (ref 6–12)
POTASSIUM SERPL-SCNC: 3.9 MMOL/L (ref 3.7–5.3)
RBC # BLD: 4.55 M/UL (ref 4–5.2)
RBC # BLD: ABNORMAL 10*6/UL
SEG NEUTROPHILS: 80 % (ref 36–66)
SEGMENTED NEUTROPHILS ABSOLUTE COUNT: 7.6 K/UL (ref 1.3–9.1)
SODIUM BLD-SCNC: 138 MMOL/L (ref 135–144)
TOTAL PROTEIN: 7.2 G/DL (ref 6.4–8.3)
TROPONIN INTERP: NORMAL
TROPONIN T: <0.03 NG/ML
WBC # BLD: 9.4 K/UL (ref 3.5–11)
WBC # BLD: ABNORMAL 10*3/UL

## 2018-03-12 PROCEDURE — 80053 COMPREHEN METABOLIC PANEL: CPT

## 2018-03-12 PROCEDURE — 2580000003 HC RX 258: Performed by: FAMILY MEDICINE

## 2018-03-12 PROCEDURE — G0378 HOSPITAL OBSERVATION PER HR: HCPCS

## 2018-03-12 PROCEDURE — 84484 ASSAY OF TROPONIN QUANT: CPT

## 2018-03-12 PROCEDURE — 76705 ECHO EXAM OF ABDOMEN: CPT

## 2018-03-12 PROCEDURE — 6360000002 HC RX W HCPCS: Performed by: EMERGENCY MEDICINE

## 2018-03-12 PROCEDURE — 71045 X-RAY EXAM CHEST 1 VIEW: CPT

## 2018-03-12 PROCEDURE — 93005 ELECTROCARDIOGRAM TRACING: CPT

## 2018-03-12 PROCEDURE — 6360000002 HC RX W HCPCS: Performed by: FAMILY MEDICINE

## 2018-03-12 PROCEDURE — 2580000003 HC RX 258: Performed by: EMERGENCY MEDICINE

## 2018-03-12 PROCEDURE — 36415 COLL VENOUS BLD VENIPUNCTURE: CPT

## 2018-03-12 PROCEDURE — 96376 TX/PRO/DX INJ SAME DRUG ADON: CPT

## 2018-03-12 PROCEDURE — 99285 EMERGENCY DEPT VISIT HI MDM: CPT

## 2018-03-12 PROCEDURE — 85025 COMPLETE CBC W/AUTO DIFF WBC: CPT

## 2018-03-12 PROCEDURE — 83605 ASSAY OF LACTIC ACID: CPT

## 2018-03-12 PROCEDURE — 96374 THER/PROPH/DIAG INJ IV PUSH: CPT

## 2018-03-12 PROCEDURE — 83690 ASSAY OF LIPASE: CPT

## 2018-03-12 PROCEDURE — 96375 TX/PRO/DX INJ NEW DRUG ADDON: CPT

## 2018-03-12 RX ORDER — MORPHINE SULFATE 4 MG/ML
4 INJECTION, SOLUTION INTRAMUSCULAR; INTRAVENOUS EVERY 4 HOURS PRN
Status: DISCONTINUED | OUTPATIENT
Start: 2018-03-12 | End: 2018-03-14

## 2018-03-12 RX ORDER — SODIUM CHLORIDE 0.9 % (FLUSH) 0.9 %
10 SYRINGE (ML) INJECTION PRN
Status: DISCONTINUED | OUTPATIENT
Start: 2018-03-12 | End: 2018-03-17 | Stop reason: HOSPADM

## 2018-03-12 RX ORDER — SODIUM CHLORIDE 9 MG/ML
INJECTION, SOLUTION INTRAVENOUS CONTINUOUS
Status: DISCONTINUED | OUTPATIENT
Start: 2018-03-12 | End: 2018-03-17 | Stop reason: HOSPADM

## 2018-03-12 RX ORDER — SERTRALINE HYDROCHLORIDE 100 MG/1
100 TABLET, FILM COATED ORAL DAILY
Status: DISCONTINUED | OUTPATIENT
Start: 2018-03-13 | End: 2018-03-17 | Stop reason: HOSPADM

## 2018-03-12 RX ORDER — SODIUM CHLORIDE 0.9 % (FLUSH) 0.9 %
10 SYRINGE (ML) INJECTION EVERY 12 HOURS SCHEDULED
Status: DISCONTINUED | OUTPATIENT
Start: 2018-03-12 | End: 2018-03-17 | Stop reason: HOSPADM

## 2018-03-12 RX ORDER — 0.9 % SODIUM CHLORIDE 0.9 %
1000 INTRAVENOUS SOLUTION INTRAVENOUS ONCE
Status: COMPLETED | OUTPATIENT
Start: 2018-03-12 | End: 2018-03-12

## 2018-03-12 RX ORDER — PREDNISOLONE ACETATE 10 MG/ML
1 SUSPENSION/ DROPS OPHTHALMIC
Status: DISCONTINUED | OUTPATIENT
Start: 2018-03-12 | End: 2018-03-17 | Stop reason: HOSPADM

## 2018-03-12 RX ORDER — ONDANSETRON 2 MG/ML
4 INJECTION INTRAMUSCULAR; INTRAVENOUS ONCE
Status: COMPLETED | OUTPATIENT
Start: 2018-03-12 | End: 2018-03-12

## 2018-03-12 RX ORDER — ONDANSETRON 2 MG/ML
4 INJECTION INTRAMUSCULAR; INTRAVENOUS EVERY 6 HOURS PRN
Status: DISCONTINUED | OUTPATIENT
Start: 2018-03-12 | End: 2018-03-12

## 2018-03-12 RX ORDER — ACETAMINOPHEN 325 MG/1
650 TABLET ORAL EVERY 4 HOURS PRN
Status: DISCONTINUED | OUTPATIENT
Start: 2018-03-12 | End: 2018-03-17 | Stop reason: HOSPADM

## 2018-03-12 RX ORDER — FENTANYL CITRATE 50 UG/ML
100 INJECTION, SOLUTION INTRAMUSCULAR; INTRAVENOUS ONCE
Status: COMPLETED | OUTPATIENT
Start: 2018-03-12 | End: 2018-03-12

## 2018-03-12 RX ORDER — FENTANYL CITRATE 50 UG/ML
50 INJECTION, SOLUTION INTRAMUSCULAR; INTRAVENOUS EVERY 4 HOURS PRN
Status: DISCONTINUED | OUTPATIENT
Start: 2018-03-12 | End: 2018-03-16

## 2018-03-12 RX ORDER — TOPIRAMATE 25 MG/1
50 TABLET ORAL 2 TIMES DAILY
Status: DISCONTINUED | OUTPATIENT
Start: 2018-03-12 | End: 2018-03-17 | Stop reason: HOSPADM

## 2018-03-12 RX ORDER — TRAZODONE HYDROCHLORIDE 50 MG/1
50 TABLET ORAL NIGHTLY PRN
Status: DISCONTINUED | OUTPATIENT
Start: 2018-03-13 | End: 2018-03-17 | Stop reason: HOSPADM

## 2018-03-12 RX ORDER — ONDANSETRON 2 MG/ML
4 INJECTION INTRAMUSCULAR; INTRAVENOUS EVERY 4 HOURS PRN
Status: DISCONTINUED | OUTPATIENT
Start: 2018-03-12 | End: 2018-03-17 | Stop reason: HOSPADM

## 2018-03-12 RX ORDER — BUPROPION HYDROCHLORIDE 150 MG/1
150 TABLET ORAL EVERY MORNING
Status: DISCONTINUED | OUTPATIENT
Start: 2018-03-13 | End: 2018-03-17 | Stop reason: HOSPADM

## 2018-03-12 RX ORDER — TOPIRAMATE 25 MG/1
50 CAPSULE, COATED PELLETS ORAL 2 TIMES DAILY
Status: DISCONTINUED | OUTPATIENT
Start: 2018-03-12 | End: 2018-03-12 | Stop reason: RX

## 2018-03-12 RX ORDER — MORPHINE SULFATE 2 MG/ML
2 INJECTION, SOLUTION INTRAMUSCULAR; INTRAVENOUS EVERY 4 HOURS PRN
Status: DISCONTINUED | OUTPATIENT
Start: 2018-03-12 | End: 2018-03-14

## 2018-03-12 RX ADMIN — FENTANYL CITRATE 100 MCG: 50 INJECTION INTRAMUSCULAR; INTRAVENOUS at 18:41

## 2018-03-12 RX ADMIN — ONDANSETRON 4 MG: 2 INJECTION INTRAMUSCULAR; INTRAVENOUS at 16:44

## 2018-03-12 RX ADMIN — SODIUM CHLORIDE: 9 INJECTION, SOLUTION INTRAVENOUS at 21:24

## 2018-03-12 RX ADMIN — Medication 10 ML: at 21:25

## 2018-03-12 RX ADMIN — FENTANYL CITRATE 100 MCG: 50 INJECTION INTRAMUSCULAR; INTRAVENOUS at 16:44

## 2018-03-12 RX ADMIN — MORPHINE SULFATE 4 MG: 4 INJECTION, SOLUTION INTRAMUSCULAR; INTRAVENOUS at 22:40

## 2018-03-12 RX ADMIN — SODIUM CHLORIDE 1000 ML: 9 INJECTION, SOLUTION INTRAVENOUS at 16:44

## 2018-03-12 ASSESSMENT — PAIN SCALES - GENERAL
PAINLEVEL_OUTOF10: 9
PAINLEVEL_OUTOF10: 7
PAINLEVEL_OUTOF10: 8
PAINLEVEL_OUTOF10: 7
PAINLEVEL_OUTOF10: 7
PAINLEVEL_OUTOF10: 4
PAINLEVEL_OUTOF10: 9
PAINLEVEL_OUTOF10: 4
PAINLEVEL_OUTOF10: 8

## 2018-03-12 ASSESSMENT — PAIN DESCRIPTION - PAIN TYPE
TYPE: ACUTE PAIN

## 2018-03-12 ASSESSMENT — PAIN DESCRIPTION - LOCATION: LOCATION: ABDOMEN;CHEST

## 2018-03-12 NOTE — ED PROVIDER NOTES
(Henoch Schonlein purpura) ([facility] Juvenile rheumatoid arthritis ([facility] [address] [facility] Post traumatic stress disorder (PTSD); and Uveitis. SURGICAL HISTORY      has a past surgical history that includes Hysterectomy (2016);  section (2016); Tonsillectomy and adenoidectomy; eye surgery (Left, 2009); and Knee arthroscopy (Right). CURRENT MEDICATIONS       Previous Medications    BUPROPION (WELLBUTRIN XL) 150 MG EXTENDED RELEASE TABLET    Take 1 tablet by mouth every morning    IBUPROFEN (ADVIL;MOTRIN) 800 MG TABLET    Take 1 tablet by mouth every 8 hours as needed for Pain    PREDNISOLONE ACETATE (PRED FORTE) 1 % OPHTHALMIC SUSPENSION    Apply 1 drop to eye every hour Left eye    SERTRALINE (ZOLOFT) 100 MG TABLET    Take 1 tablet by mouth daily    TOPIRAMATE (TOPAMAX) 50 MG TABLET    Take 1 tablet by mouth 2 times daily    TRAZODONE (DESYREL) 50 MG TABLET    1 to 2 tablets if needed nightly prn       ALLERGIES     is allergic to xeljanz [tofacitinib] and ceclor [cefaclor]. FAMILY HISTORY     indicated that the status of her mother is unknown. She indicated that the status of her maternal grandmother is unknown. She indicated that the status of her maternal grandfather is unknown. She indicated that the status of her paternal grandfather is unknown. She indicated that the status of her maternal aunt is unknown.      family history includes Breast Cancer in her maternal aunt; Diabetes in her maternal grandmother; High Blood Pressure in her mother; Prostate Cancer in her paternal grandfather; Stroke in her maternal grandfather. SOCIAL HISTORY      reports that she has been smoking Cigarettes. She started smoking about 3 years ago. She has been smoking about 0.25 packs per day. She has never used smokeless tobacco. She reports that she drinks alcohol. She reports that she uses drugs, including Marijuana. PHYSICAL EXAM     INITIAL VITALS:  height is 5' 2\" (1.575 m) and weight is 170 lb (77.1 kg).  Her oral temperature is 98.5 °F (36.9 °C). Her blood pressure is 103/66 and her pulse is 58. Her respiration is 14 and oxygen saturation is 100%. Physical Exam   Constitutional: She is oriented to person, place, and time and well-developed, well-nourished, and in no distress. No distress. HENT:   Head: Normocephalic and atraumatic. Mouth/Throat: Oropharynx is clear and moist.   Eyes: Conjunctivae are normal. Pupils are equal, round, and reactive to light. Neck: Neck supple. Cardiovascular: Normal rate, regular rhythm, normal heart sounds and intact distal pulses. No murmur heard. Pulmonary/Chest: Effort normal and breath sounds normal. No respiratory distress. Abdominal: Soft. Bowel sounds are normal. She exhibits no distension. There is tenderness in the right upper quadrant, epigastric area and left upper quadrant. There is positive Teague's sign. There is no rigidity, no rebound, no guarding and no CVA tenderness. Musculoskeletal: She exhibits no edema or tenderness. Lymphadenopathy:     She has no cervical adenopathy. Neurological: She is alert and oriented to person, place, and time. GCS score is 15. Skin: Skin is warm and dry. No rash noted. Psychiatric: Affect and judgment normal.   Nursing note and vitals reviewed. DIFFERENTIAL DIAGNOSIS/MDM:   Cholelithiasis versus cholecystitis  Pancreatitis  GERD  PUD  Unlikely ACS  Unlikely PE    68-year-old female presents with epigastric and right upper quadrant abdominal pain with some radiation to the chest.  She is afebrile and nontoxic in appearance. Vital signs within normal limits. She is actually borderline bradycardic. She is not hypoxic. Oxygen saturation is 99% on room air. Suspect gallbladder pathology, pancreatitis or gastritis pathology. Very low suspicion for PE or ACS. I believe her chest pain is manifested from her abdominal pain. EKG and troponin however. Pulmonary was ruled out using perc criteria.   We'll get Value    Seg Neutrophils 80 (*)     Lymphocytes 12 (*)     All other components within normal limits   COMPREHENSIVE METABOLIC PANEL - Abnormal; Notable for the following:     Glucose 119 (*)     Alkaline Phosphatase 238 (*)      (*)      (*)     All other components within normal limits   LIPASE   LACTIC ACID, PLASMA   TROPONIN         EMERGENCY DEPARTMENT COURSE:   Vitals:    Vitals:    03/12/18 1609 03/12/18 1746   BP: 111/61 103/66   Pulse: 59 58   Resp: 15 14   Temp: 98.6 °F (37 °C) 98.5 °F (36.9 °C)   TempSrc: Oral Oral   SpO2: 99% 100%   Weight: 170 lb (77.1 kg)    Height: 5' 2\" (1.575 m)      6:21 PM  Ultrasound shows no evidence of acute cholecystitis however there is evidence of gallbladder sludge and stones. Patient is still symptomatic. LFTs are also elevated. I spoke with Dr. Derek Garcia patient's primary care physician who agrees for admission for GI consultation as well as HIDA scan. Patient given another dose pain medication. She does feel symptomatically improved however is still symptomatic. Cardiac workup is negative. I suspect her chest pain is secondary to her epigastric and right upper quadrant pain. 6:47 PM  Spoke with Dr. Ivy Jenkins and discussed case. He stated he would evaluate patient. No further recommendations at this time. CRITICAL CARE:      CONSULTS:  IP CONSULT TO PRIMARY CARE PROVIDER  IP CONSULT TO GI      PROCEDURES:      FINAL IMPRESSION      1. Right upper quadrant abdominal pain    2.  Elevated LFTs            DISPOSITION/PLAN   DISPOSITION      Admission    PATIENT REFERRED TO:  Carlos Gregory [address] [place] [address]  301 [facility] [address]  1301 [facility] 264  [phone]            DISCHARGE MEDICATIONS:  New Prescriptions    No medications on file       (Please note that portions of this note were completed with a voice recognition program.  Efforts were made to edit the dictations but occasionally words are mis-transcribed.)    Maggi Bingham DO  Attending Emergency Physician          Haim Alejandra, DO  03/12/18 45 Nella Stewart, DO  03/12/18 1790

## 2018-03-13 ENCOUNTER — APPOINTMENT (OUTPATIENT)
Dept: NUCLEAR MEDICINE | Age: 28
DRG: 263 | End: 2018-03-13
Payer: COMMERCIAL

## 2018-03-13 ENCOUNTER — ANESTHESIA EVENT (OUTPATIENT)
Dept: OPERATING ROOM | Age: 28
DRG: 263 | End: 2018-03-13
Payer: COMMERCIAL

## 2018-03-13 PROBLEM — R10.13 EPIGASTRIC PAIN: Status: ACTIVE | Noted: 2018-03-13

## 2018-03-13 PROBLEM — E44.0 MODERATE MALNUTRITION (HCC): Status: ACTIVE | Noted: 2018-03-13

## 2018-03-13 PROBLEM — R74.8 ELEVATED LIVER ENZYMES: Status: ACTIVE | Noted: 2018-03-13

## 2018-03-13 PROBLEM — R17 TOTAL BILIRUBIN, ELEVATED: Status: ACTIVE | Noted: 2018-03-13

## 2018-03-13 PROBLEM — R10.11 RUQ PAIN: Status: ACTIVE | Noted: 2018-03-13

## 2018-03-13 LAB
ABSOLUTE EOS #: 0.1 K/UL (ref 0–0.4)
ABSOLUTE IMMATURE GRANULOCYTE: ABNORMAL K/UL (ref 0–0.3)
ABSOLUTE LYMPH #: 1.7 K/UL (ref 1–4.8)
ABSOLUTE MONO #: 0.4 K/UL (ref 0.1–1.3)
ALBUMIN SERPL-MCNC: 3.4 G/DL (ref 3.5–5.2)
ALBUMIN/GLOBULIN RATIO: ABNORMAL (ref 1–2.5)
ALP BLD-CCNC: 260 U/L (ref 35–104)
ALT SERPL-CCNC: 447 U/L (ref 5–33)
AMYLASE: 43 U/L (ref 28–100)
ANION GAP SERPL CALCULATED.3IONS-SCNC: 11 MMOL/L (ref 9–17)
AST SERPL-CCNC: 443 U/L
BASOPHILS # BLD: 1 % (ref 0–2)
BASOPHILS ABSOLUTE: 0 K/UL (ref 0–0.2)
BILIRUB SERPL-MCNC: 1.86 MG/DL (ref 0.3–1.2)
BUN BLDV-MCNC: 7 MG/DL (ref 6–20)
BUN/CREAT BLD: ABNORMAL (ref 9–20)
CALCIUM SERPL-MCNC: 8.6 MG/DL (ref 8.6–10.4)
CHLORIDE BLD-SCNC: 105 MMOL/L (ref 98–107)
CO2: 22 MMOL/L (ref 20–31)
CREAT SERPL-MCNC: 0.73 MG/DL (ref 0.5–0.9)
DIFFERENTIAL TYPE: ABNORMAL
EOSINOPHILS RELATIVE PERCENT: 2 % (ref 0–4)
GFR AFRICAN AMERICAN: >60 ML/MIN
GFR NON-AFRICAN AMERICAN: >60 ML/MIN
GFR SERPL CREATININE-BSD FRML MDRD: ABNORMAL ML/MIN/{1.73_M2}
GFR SERPL CREATININE-BSD FRML MDRD: ABNORMAL ML/MIN/{1.73_M2}
GLUCOSE BLD-MCNC: 93 MG/DL (ref 70–99)
HAV IGM SER IA-ACNC: NONREACTIVE
HBV SURFACE AB TITR SER: 482.5 MIU/ML
HCT VFR BLD CALC: 37.4 % (ref 36–46)
HEMOGLOBIN: 12.3 G/DL (ref 12–16)
HEPATITIS B CORE IGM ANTIBODY: NONREACTIVE
HEPATITIS B SURFACE ANTIGEN: NONREACTIVE
HEPATITIS C ANTIBODY: NONREACTIVE
IMMATURE GRANULOCYTES: ABNORMAL %
LIPASE: 28 U/L (ref 13–60)
LYMPHOCYTES # BLD: 40 % (ref 24–44)
MCH RBC QN AUTO: 28.5 PG (ref 26–34)
MCHC RBC AUTO-ENTMCNC: 32.8 G/DL (ref 31–37)
MCV RBC AUTO: 86.8 FL (ref 80–100)
MONOCYTES # BLD: 9 % (ref 1–7)
NRBC AUTOMATED: ABNORMAL PER 100 WBC
PDW BLD-RTO: 14.5 % (ref 11.5–14.9)
PLATELET # BLD: 170 K/UL (ref 150–450)
PLATELET ESTIMATE: ABNORMAL
PMV BLD AUTO: 7.7 FL (ref 6–12)
POTASSIUM SERPL-SCNC: 4 MMOL/L (ref 3.7–5.3)
RBC # BLD: 4.3 M/UL (ref 4–5.2)
RBC # BLD: ABNORMAL 10*6/UL
SEG NEUTROPHILS: 48 % (ref 36–66)
SEGMENTED NEUTROPHILS ABSOLUTE COUNT: 2.1 K/UL (ref 1.3–9.1)
SODIUM BLD-SCNC: 138 MMOL/L (ref 135–144)
TOTAL PROTEIN: 6.1 G/DL (ref 6.4–8.3)
WBC # BLD: 4.3 K/UL (ref 3.5–11)
WBC # BLD: ABNORMAL 10*3/UL

## 2018-03-13 PROCEDURE — 1200000000 HC SEMI PRIVATE

## 2018-03-13 PROCEDURE — 6360000002 HC RX W HCPCS: Performed by: FAMILY MEDICINE

## 2018-03-13 PROCEDURE — 6360000002 HC RX W HCPCS: Performed by: RADIOLOGY

## 2018-03-13 PROCEDURE — 86705 HEP B CORE ANTIBODY IGM: CPT

## 2018-03-13 PROCEDURE — 83690 ASSAY OF LIPASE: CPT

## 2018-03-13 PROCEDURE — 86709 HEPATITIS A IGM ANTIBODY: CPT

## 2018-03-13 PROCEDURE — 86317 IMMUNOASSAY INFECTIOUS AGENT: CPT

## 2018-03-13 PROCEDURE — 36415 COLL VENOUS BLD VENIPUNCTURE: CPT

## 2018-03-13 PROCEDURE — 80053 COMPREHEN METABOLIC PANEL: CPT

## 2018-03-13 PROCEDURE — 99223 1ST HOSP IP/OBS HIGH 75: CPT | Performed by: FAMILY MEDICINE

## 2018-03-13 PROCEDURE — 6360000002 HC RX W HCPCS: Performed by: INTERNAL MEDICINE

## 2018-03-13 PROCEDURE — 82150 ASSAY OF AMYLASE: CPT

## 2018-03-13 PROCEDURE — A9537 TC99M MEBROFENIN: HCPCS | Performed by: FAMILY MEDICINE

## 2018-03-13 PROCEDURE — 85025 COMPLETE CBC W/AUTO DIFF WBC: CPT

## 2018-03-13 PROCEDURE — 96376 TX/PRO/DX INJ SAME DRUG ADON: CPT

## 2018-03-13 PROCEDURE — 6370000000 HC RX 637 (ALT 250 FOR IP): Performed by: INTERNAL MEDICINE

## 2018-03-13 PROCEDURE — 87340 HEPATITIS B SURFACE AG IA: CPT

## 2018-03-13 PROCEDURE — 86803 HEPATITIS C AB TEST: CPT

## 2018-03-13 PROCEDURE — 6370000000 HC RX 637 (ALT 250 FOR IP): Performed by: FAMILY MEDICINE

## 2018-03-13 PROCEDURE — 78226 HEPATOBILIARY SYSTEM IMAGING: CPT

## 2018-03-13 PROCEDURE — 3430000000 HC RX DIAGNOSTIC RADIOPHARMACEUTICAL: Performed by: FAMILY MEDICINE

## 2018-03-13 PROCEDURE — 2580000003 HC RX 258: Performed by: INTERNAL MEDICINE

## 2018-03-13 PROCEDURE — 2500000003 HC RX 250 WO HCPCS: Performed by: INTERNAL MEDICINE

## 2018-03-13 PROCEDURE — 99254 IP/OBS CNSLTJ NEW/EST MOD 60: CPT | Performed by: INTERNAL MEDICINE

## 2018-03-13 PROCEDURE — 2580000003 HC RX 258: Performed by: FAMILY MEDICINE

## 2018-03-13 PROCEDURE — 86038 ANTINUCLEAR ANTIBODIES: CPT

## 2018-03-13 RX ORDER — MORPHINE SULFATE 4 MG/ML
3.2 INJECTION, SOLUTION INTRAMUSCULAR; INTRAVENOUS ONCE
Status: COMPLETED | OUTPATIENT
Start: 2018-03-13 | End: 2018-03-13

## 2018-03-13 RX ORDER — SODIUM CHLORIDE 0.9 % (FLUSH) 0.9 %
10 SYRINGE (ML) INJECTION PRN
Status: DISCONTINUED | OUTPATIENT
Start: 2018-03-13 | End: 2018-03-13

## 2018-03-13 RX ORDER — CIPROFLOXACIN 500 MG/1
500 TABLET, FILM COATED ORAL EVERY 12 HOURS SCHEDULED
Status: DISCONTINUED | OUTPATIENT
Start: 2018-03-13 | End: 2018-03-16

## 2018-03-13 RX ORDER — METRONIDAZOLE 500 MG/1
500 TABLET ORAL EVERY 8 HOURS SCHEDULED
Status: DISCONTINUED | OUTPATIENT
Start: 2018-03-13 | End: 2018-03-16

## 2018-03-13 RX ADMIN — TOPIRAMATE 50 MG: 25 TABLET, FILM COATED ORAL at 22:14

## 2018-03-13 RX ADMIN — BUPROPION HYDROCHLORIDE 150 MG: 150 TABLET, FILM COATED, EXTENDED RELEASE ORAL at 11:37

## 2018-03-13 RX ADMIN — ONDANSETRON 4 MG: 2 INJECTION INTRAMUSCULAR; INTRAVENOUS at 13:50

## 2018-03-13 RX ADMIN — CIPROFLOXACIN HYDROCHLORIDE 500 MG: 500 TABLET, FILM COATED ORAL at 11:42

## 2018-03-13 RX ADMIN — Medication 10 ML: at 08:25

## 2018-03-13 RX ADMIN — SERTRALINE HYDROCHLORIDE 100 MG: 100 TABLET ORAL at 11:37

## 2018-03-13 RX ADMIN — SODIUM CHLORIDE: 9 INJECTION, SOLUTION INTRAVENOUS at 13:58

## 2018-03-13 RX ADMIN — Medication 3.7 MILLICURIE: at 08:25

## 2018-03-13 RX ADMIN — Medication 10 ML: at 09:42

## 2018-03-13 RX ADMIN — FOLIC ACID: 5 INJECTION, SOLUTION INTRAMUSCULAR; INTRAVENOUS; SUBCUTANEOUS at 18:40

## 2018-03-13 RX ADMIN — Medication 2 MILLICURIE: at 09:42

## 2018-03-13 RX ADMIN — MORPHINE SULFATE: 4 INJECTION, SOLUTION INTRAMUSCULAR; INTRAVENOUS at 09:44

## 2018-03-13 RX ADMIN — SODIUM CHLORIDE: 9 INJECTION, SOLUTION INTRAVENOUS at 06:05

## 2018-03-13 RX ADMIN — METRONIDAZOLE 500 MG: 500 TABLET ORAL at 13:56

## 2018-03-13 RX ADMIN — ENOXAPARIN SODIUM 40 MG: 40 INJECTION SUBCUTANEOUS at 11:37

## 2018-03-13 RX ADMIN — TOPIRAMATE 50 MG: 25 TABLET, FILM COATED ORAL at 11:38

## 2018-03-13 ASSESSMENT — ENCOUNTER SYMPTOMS
STRIDOR: 0
SHORTNESS OF BREATH: 0

## 2018-03-13 ASSESSMENT — PAIN SCALES - GENERAL: PAINLEVEL_OUTOF10: 2

## 2018-03-13 ASSESSMENT — LIFESTYLE VARIABLES: SMOKING_STATUS: 0

## 2018-03-13 NOTE — PROGRESS NOTES
Patient had five episodes of diarrhea. RN spoke with Dr. Candace Good. New orders received: hold antibiotics and change diet from NPO to Full Liquid.

## 2018-03-13 NOTE — PLAN OF CARE
Problem: Nutrition  Goal: Optimal nutrition therapy  Outcome: Ongoing  Nutrition Problem: Inadequate oral intake  Intervention: Food and/or Nutrient Delivery: Continue NPO  Nutritional Goals: Adequate nutrition provision

## 2018-03-13 NOTE — PROGRESS NOTES
Nutrition Assessment    Type and Reason for Visit: Positive Nutrition Screen (weight loss, poor appetite)    Nutrition Recommendations: Continue NPO as ordered and advance diet as appropriate. Malnutrition Assessment:  · Malnutrition Status: Meets the criteria for moderate malnutrition  · Context: Acute illness or injury  · Findings of the 6 clinical characteristics of malnutrition (Minimum of 2 out of 6 clinical characteristics is required to make the diagnosis of moderate or severe Protein Calorie Malnutrition based on AND/ASPEN Guidelines):  1. Energy Intake-Less than or equal to 50%, greater than or equal to 5 days    2. Weight Loss-1-2% loss, in 1 week  3. Fat Loss-No significant subcutaneous fat loss,    4. Muscle Loss-No significant muscle mass loss,    5. Fluid Accumulation-No significant fluid accumulation,      Nutrition Diagnosis:   · Problem: Inadequate oral intake  · Etiology: related to Nausea, Diarrhea     Signs and symptoms:  as evidenced by NPO status due to medical condition, Weight loss, Nausea, Diarrhea    Nutrition Assessment:  · Subjective Assessment: Patient states she has nausea since yesterday and diarrhea started today but no vomiting. Says her diarrhea is related to antibiotics, currently NPO waiting for GI consult.    · Current Nutrition Therapies:  · Oral Diet Orders: NPO   · Oral Diet intake: NPO  · Oral Nutrition Supplement (ONS) Orders: None  · ONS intake: NPO  · Anthropometric Measures:  · Ht: 5' 2\" (157.5 cm)   · Current Body Wt: 179 lb 2 oz (81.3 kg)  · Admission Body Wt: 179 lb 2 oz (81.3 kg)  · Usual Body Wt: 182 lb (82.6 kg)  · % Weight Change: 1.6%,  1 week  · Ideal Body Wt: 110 lb (49.9 kg), % Ideal Body 163%  · BMI Classification: BMI 30.0 - 34.9 Obese Class I  · Comparative Standards (Estimated Nutrition Needs):  · Estimated Daily Total Kcal: 3055-8675  · Estimated Daily Protein (g): 75-90    Estimated Intake vs Estimated Needs: Intake Less Than Needs    Nutrition Risk Level: High    Nutrition Interventions:   Continue NPO  Continued Inpatient Monitoring    Nutrition Evaluation:   · Evaluation: Goals set   · Goals: Adequate nutrition provision    · Monitoring: NPO Status, Skin Integrity, Fluid Balance, Weight, Pertinent Labs, Nausea or Vomiting, Diarrhea    See Adult Nutrition Doc Flowsheet for more detail.      Merlin Bushman, RD, LD  Office phone [phone]

## 2018-03-13 NOTE — CARE COORDINATION
[address]  DVT Prophylaxis and Vaccine Status  Work List  Mandatory for all patients      Patient must be on both Chemical prophylaxis and Mechanical prophylaxis. If chemical/mechanical prophylaxis is not ordered, the physician must document a reason for not using prophylaxis     Chemical Prophylaxis  Is patient on chemical prophylaxis: No  If no chemical prophylaxis Is a order in for No Chemical VTE prophylaxisYes  If no was the physician notified not applicable      Mechanical Prophylaxis  Is patient on mechanical prophylaxis, intermittent pneumatic compression device: Yes  If no was the physician notified not applicable        Pneumonia Vaccine  Vaccine indicated:  Not indicated  If indicated was the vaccine given: not applicable    Influenza Vaccine (applicable from October through March):  Vaccine indicated: Up-to-date  If indicated was the vaccine given: not applicable    Patient Education  Education completed on DVT prophylaxis: yes              ADMISSION NOTE       Patient admitted to room  2056. Time of admit:  15[facility] from: ER    Reason for admission: elevated liver enzymes    Where patient has been residing for the last 24 hrs:  home    Has the patient been admitted to any facility in the last 4 weeks, which one:  no    Family at bedside: no    Patient is currently in pain yes  Patient has been oriented to room, educated on how to use call light, and to call for assistance prior to getting up. Bed in lowest and locked position. 2 siderails up for safety. Call light within reach.

## 2018-03-13 NOTE — CONSULTS
Gastroenterology Consult Note      Patient: Camacho Al  : 1990  Acct#:  [id number]     Date:  3/13/2018    Subjective:       History of Present Illness  Patient is a 33 y.o.  female admitted with Liver enzyme elevation [R74.8]  Elevated liver enzymes [R74.8] who is seen in consult for Abdominal pain and elevated liver enzymes    This is a young lady who came with one day history of severe epigastric pain that day she said also has started having pain in the epigastric area although she has it on and off before but this was so severe she never had that bad before, she tried to take some antacid and PPI and nothing had happened for which she came to the emergency room the pain is in the epigastric area right upper quadrant area associated with some shortness of breath because she could not take her breath from pain no fever no chills no vomiting no bleeding.   The pain did not radiate  Workup in the emergency room, showed elevated liver enzyme which are a lot worse today with an ALT yesterday of 123 and today is 447, AST yesterday of 232 today's 443 alkaline phosphatase yesterday 238 today 20/60 and the bilirubin was 1.1 yesterday today 1.8  Afebrile and white count is normal  Normal amylase and lipase were checked  HIDA scan done is pending  Ultrasound of the gallbladder showed gallstones without signs of acute cholecystitis  Patient did have itching and she said his stool is light and her urine is dark  She does drink moderately last drink was 3 days ago      Past Medical History:   Diagnosis Date    Chronic sinus infection     Cluster headache     History of sepsis 2016    post C-Birth/partial    HSP (Henoch Schonlein purpura) ([facility] [address]     Juvenile rheumatoid arthritis ([facility] [address]     Dr. Win Vigil traumatic stress disorder (PTSD)     Uveitis       Past Surgical History:   Procedure Laterality Date     SECTION  2016    EYE SURGERY Left  03/13/18   1048   LIPASE  30  28   AMYLASE   --   43     No results for input(s): PROTIME, INR in the last 72 hours. No results for input(s): PTT in the last 72 hours. No results for input(s): OCCULTBLD in the last 72 hours. CEA:  No results found for: CEA  Ca 125:  No results found for:   Ca 19-9:  No results found for:   Ca 15-3:  No results found for:   AFP:  No components found for: AFAFP  Beta HCG:  No components found for: BHCG  Neuron Specific Enolase:  No results found for: NSE  Imaging Studies:                           All appropriate imaging studies and reports reviewed: Yes                 Assessment:     Active Problems:    Post traumatic stress disorder (PTSD)    Lumbar disc disease    Chronic nonintractable headache    Liver enzyme elevation    Total bilirubin, elevated    Epigastric pain    RUQ pain    Elevated liver enzymes    Moderate malnutrition (HCC)  Resolved Problems:    * No resolved hospital problems. *    Abdominal pain elevated liver enzymes abnormal imaging studies most likely cholecystitis but cholangitis has to be ruled out  Pancreatitis has to be ruled out    Recommendations:   IV antibiotics  IV fluid  Amylase and lipase if they're abnormal we'll continue with n.p.o. otherwise we can give full liquid or soft diet  Based on HIDA scan we might plan ERCP  Surgery consult for possible cholecystectomy  Liver diseases workup  Might need prophylaxis                       Thank you for allowing me to participate in the care of your patient. Please feel free to contact me with any questions or concerns.      Brandi Driver MD

## 2018-03-13 NOTE — ANESTHESIA PRE PROCEDURE
BUN 7 03/13/2018    CREATININE 0.73 03/13/2018    GFRAA >60 03/13/2018    LABGLOM >60 03/13/2018    GLUCOSE 93 03/13/2018    GLUCOSE 86 10/29/2011    PROT 6.1 03/13/2018    CALCIUM 8.6 03/13/2018    BILITOT 1.86 03/13/2018    ALKPHOS 260 03/13/2018     03/13/2018     03/13/2018       POC Tests: No results for input(s): POCGLU, POCNA, POCK, POCCL, POCBUN, POCHEMO, POCHCT in the last 72 hours. Coags: No results found for: PROTIME, INR, APTT    HCG (If Applicable):   Lab Results   Component Value Date    PREGTESTUR NEGATIVE 02/22/2018        ABGs: No results found for: PHART, PO2ART, KLU6HHA, EPN5JBQ, BEART, B8OGALOL     Type & Screen (If Applicable):  No results found for: [facility]    Anesthesia Evaluation  Patient summary reviewed no history of anesthetic complications:   Airway: Mallampati: II  TM distance: >3 FB   Neck ROM: full  Mouth opening: > = 3 FB Dental: normal exam         Pulmonary:Negative Pulmonary ROS and normal exam        (-) pneumonia, COPD, asthma, shortness of breath, recent URI, sleep apnea, rhonchi, wheezes, rales, stridor and not a current smoker          Patient smoked on day of surgery.                  Cardiovascular:Negative CV ROS  Exercise tolerance: good (>4 METS),       (-) pacemaker, hypertension, valvular problems/murmurs, past MI, CAD, CABG/stent, dysrhythmias,  angina,  CHF, orthopnea, PND,  ASTUDILLO, murmur, weak pulses,  friction rub, systolic click, carotid bruit,  JVD and peripheral edema    ECG reviewed  Rhythm: regular  Rate: normal           Beta Blocker:  Not on Beta Blocker         Neuro/Psych:   (+) psychiatric history: stable with treatmentdepression/anxiety    (-) seizures, neuromuscular disease, TIA, CVA and headaches           GI/Hepatic/Renal: Neg GI/Hepatic/Renal ROS       (-) hiatal hernia, GERD, PUD, hepatitis, liver disease, no renal disease, bowel prep and no morbid obesity       Endo/Other: Negative Endo/Other ROS   (+) no malignancy/cancer. (-) diabetes mellitus, hypothyroidism, hyperthyroidism, blood dyscrasia, arthritis, no electrolyte abnormalities, no malignancy/cancer               Abdominal:           Vascular: negative vascular ROS. - PVD, DVT and PE. Anesthesia Plan      general     ASA 2       Induction: intravenous. MIPS: Postoperative opioids intended and Prophylactic antiemetics administered. Anesthetic plan and risks discussed with patient.                       Freedom Shane MD   3/13/2018

## 2018-03-14 ENCOUNTER — APPOINTMENT (OUTPATIENT)
Dept: GENERAL RADIOLOGY | Age: 28
DRG: 263 | End: 2018-03-14
Payer: COMMERCIAL

## 2018-03-14 ENCOUNTER — ANESTHESIA (OUTPATIENT)
Dept: OPERATING ROOM | Age: 28
DRG: 263 | End: 2018-03-14
Payer: COMMERCIAL

## 2018-03-14 VITALS
OXYGEN SATURATION: 99 % | RESPIRATION RATE: 2 BRPM | TEMPERATURE: 96.8 F | SYSTOLIC BLOOD PRESSURE: 154 MMHG | DIASTOLIC BLOOD PRESSURE: 71 MMHG

## 2018-03-14 PROBLEM — K81.1 CHRONIC CHOLECYSTITIS: Status: ACTIVE | Noted: 2018-03-14

## 2018-03-14 LAB
ABSOLUTE EOS #: 0.1 K/UL (ref 0–0.4)
ABSOLUTE IMMATURE GRANULOCYTE: NORMAL K/UL (ref 0–0.3)
ABSOLUTE LYMPH #: 1.7 K/UL (ref 1–4.8)
ABSOLUTE MONO #: 0.4 K/UL (ref 0.1–1.3)
ALBUMIN SERPL-MCNC: 3.7 G/DL (ref 3.5–5.2)
ALBUMIN/GLOBULIN RATIO: ABNORMAL (ref 1–2.5)
ALP BLD-CCNC: 255 U/L (ref 35–104)
ALT SERPL-CCNC: 307 U/L (ref 5–33)
ANION GAP SERPL CALCULATED.3IONS-SCNC: 13 MMOL/L (ref 9–17)
ANTI-NUCLEAR ANTIBODY (ANA): NEGATIVE
AST SERPL-CCNC: 126 U/L
BASOPHILS # BLD: 1 % (ref 0–2)
BASOPHILS ABSOLUTE: 0 K/UL (ref 0–0.2)
BILIRUB SERPL-MCNC: 0.56 MG/DL (ref 0.3–1.2)
BUN BLDV-MCNC: 7 MG/DL (ref 6–20)
BUN/CREAT BLD: ABNORMAL (ref 9–20)
CALCIUM SERPL-MCNC: 8.7 MG/DL (ref 8.6–10.4)
CHLORIDE BLD-SCNC: 104 MMOL/L (ref 98–107)
CO2: 21 MMOL/L (ref 20–31)
CREAT SERPL-MCNC: 0.85 MG/DL (ref 0.5–0.9)
DIFFERENTIAL TYPE: NORMAL
EOSINOPHILS RELATIVE PERCENT: 2 % (ref 0–4)
GFR AFRICAN AMERICAN: >60 ML/MIN
GFR NON-AFRICAN AMERICAN: >60 ML/MIN
GFR SERPL CREATININE-BSD FRML MDRD: ABNORMAL ML/MIN/{1.73_M2}
GFR SERPL CREATININE-BSD FRML MDRD: ABNORMAL ML/MIN/{1.73_M2}
GLUCOSE BLD-MCNC: 75 MG/DL (ref 70–99)
HCT VFR BLD CALC: 37.7 % (ref 36–46)
HEMOGLOBIN: 12.3 G/DL (ref 12–16)
IMMATURE GRANULOCYTES: NORMAL %
LYMPHOCYTES # BLD: 29 % (ref 24–44)
MCH RBC QN AUTO: 28.3 PG (ref 26–34)
MCHC RBC AUTO-ENTMCNC: 32.7 G/DL (ref 31–37)
MCV RBC AUTO: 86.6 FL (ref 80–100)
MONOCYTES # BLD: 7 % (ref 1–7)
NRBC AUTOMATED: NORMAL PER 100 WBC
PDW BLD-RTO: 13.9 % (ref 11.5–14.9)
PLATELET # BLD: 195 K/UL (ref 150–450)
PLATELET ESTIMATE: NORMAL
PMV BLD AUTO: 7.9 FL (ref 6–12)
POTASSIUM SERPL-SCNC: 3.9 MMOL/L (ref 3.7–5.3)
RBC # BLD: 4.35 M/UL (ref 4–5.2)
RBC # BLD: NORMAL 10*6/UL
SEG NEUTROPHILS: 61 % (ref 36–66)
SEGMENTED NEUTROPHILS ABSOLUTE COUNT: 3.6 K/UL (ref 1.3–9.1)
SODIUM BLD-SCNC: 138 MMOL/L (ref 135–144)
TOTAL PROTEIN: 6.6 G/DL (ref 6.4–8.3)
WBC # BLD: 5.8 K/UL (ref 3.5–11)
WBC # BLD: NORMAL 10*3/UL

## 2018-03-14 PROCEDURE — 3609018800 HC ERCP DX COLLECTION SPECIMEN BRUSHING/WASHING: Performed by: INTERNAL MEDICINE

## 2018-03-14 PROCEDURE — 6360000002 HC RX W HCPCS: Performed by: NURSE ANESTHETIST, CERTIFIED REGISTERED

## 2018-03-14 PROCEDURE — 3700000000 HC ANESTHESIA ATTENDED CARE: Performed by: INTERNAL MEDICINE

## 2018-03-14 PROCEDURE — 6370000000 HC RX 637 (ALT 250 FOR IP): Performed by: FAMILY MEDICINE

## 2018-03-14 PROCEDURE — 3700000001 HC ADD 15 MINUTES (ANESTHESIA): Performed by: INTERNAL MEDICINE

## 2018-03-14 PROCEDURE — 1200000000 HC SEMI PRIVATE

## 2018-03-14 PROCEDURE — 2580000003 HC RX 258: Performed by: FAMILY MEDICINE

## 2018-03-14 PROCEDURE — 6360000002 HC RX W HCPCS: Performed by: ANESTHESIOLOGY

## 2018-03-14 PROCEDURE — 74330 X-RAY BILE/PANC ENDOSCOPY: CPT

## 2018-03-14 PROCEDURE — 36415 COLL VENOUS BLD VENIPUNCTURE: CPT

## 2018-03-14 PROCEDURE — 2580000003 HC RX 258: Performed by: ANESTHESIOLOGY

## 2018-03-14 PROCEDURE — 2720000010 HC SURG SUPPLY STERILE: Performed by: INTERNAL MEDICINE

## 2018-03-14 PROCEDURE — 6370000000 HC RX 637 (ALT 250 FOR IP): Performed by: INTERNAL MEDICINE

## 2018-03-14 PROCEDURE — 7100000001 HC PACU RECOVERY - ADDTL 15 MIN: Performed by: INTERNAL MEDICINE

## 2018-03-14 PROCEDURE — 6360000002 HC RX W HCPCS: Performed by: FAMILY MEDICINE

## 2018-03-14 PROCEDURE — 43260 ERCP W/SPECIMEN COLLECTION: CPT | Performed by: INTERNAL MEDICINE

## 2018-03-14 PROCEDURE — 85025 COMPLETE CBC W/AUTO DIFF WBC: CPT

## 2018-03-14 PROCEDURE — 3209999900 FLUORO FOR SURGICAL PROCEDURES

## 2018-03-14 PROCEDURE — BF101ZZ FLUOROSCOPY OF BILE DUCTS USING LOW OSMOLAR CONTRAST: ICD-10-PCS | Performed by: INTERNAL MEDICINE

## 2018-03-14 PROCEDURE — 7100000000 HC PACU RECOVERY - FIRST 15 MIN: Performed by: INTERNAL MEDICINE

## 2018-03-14 PROCEDURE — 2500000003 HC RX 250 WO HCPCS: Performed by: NURSE ANESTHETIST, CERTIFIED REGISTERED

## 2018-03-14 PROCEDURE — 94664 DEMO&/EVAL PT USE INHALER: CPT

## 2018-03-14 PROCEDURE — 6360000004 HC RX CONTRAST MEDICATION: Performed by: INTERNAL MEDICINE

## 2018-03-14 PROCEDURE — 99232 SBSQ HOSP IP/OBS MODERATE 35: CPT | Performed by: FAMILY MEDICINE

## 2018-03-14 PROCEDURE — 80053 COMPREHEN METABOLIC PANEL: CPT

## 2018-03-14 PROCEDURE — 0F798ZZ DILATION OF COMMON BILE DUCT, VIA NATURAL OR ARTIFICIAL OPENING ENDOSCOPIC: ICD-10-PCS | Performed by: INTERNAL MEDICINE

## 2018-03-14 RX ORDER — LIDOCAINE HYDROCHLORIDE 10 MG/ML
INJECTION, SOLUTION INFILTRATION; PERINEURAL PRN
Status: DISCONTINUED | OUTPATIENT
Start: 2018-03-14 | End: 2018-03-14 | Stop reason: SDUPTHER

## 2018-03-14 RX ORDER — MORPHINE SULFATE 2 MG/ML
4 INJECTION, SOLUTION INTRAMUSCULAR; INTRAVENOUS EVERY 4 HOURS PRN
Status: DISCONTINUED | OUTPATIENT
Start: 2018-03-14 | End: 2018-03-17 | Stop reason: HOSPADM

## 2018-03-14 RX ORDER — MIDAZOLAM HYDROCHLORIDE 1 MG/ML
INJECTION INTRAMUSCULAR; INTRAVENOUS PRN
Status: DISCONTINUED | OUTPATIENT
Start: 2018-03-14 | End: 2018-03-14 | Stop reason: SDUPTHER

## 2018-03-14 RX ORDER — FENTANYL CITRATE 50 UG/ML
INJECTION, SOLUTION INTRAMUSCULAR; INTRAVENOUS PRN
Status: DISCONTINUED | OUTPATIENT
Start: 2018-03-14 | End: 2018-03-14 | Stop reason: SDUPTHER

## 2018-03-14 RX ORDER — ROCURONIUM BROMIDE 10 MG/ML
INJECTION, SOLUTION INTRAVENOUS PRN
Status: DISCONTINUED | OUTPATIENT
Start: 2018-03-14 | End: 2018-03-14 | Stop reason: SDUPTHER

## 2018-03-14 RX ORDER — SODIUM CHLORIDE, SODIUM LACTATE, POTASSIUM CHLORIDE, CALCIUM CHLORIDE 600; 310; 30; 20 MG/100ML; MG/100ML; MG/100ML; MG/100ML
INJECTION, SOLUTION INTRAVENOUS CONTINUOUS
Status: DISCONTINUED | OUTPATIENT
Start: 2018-03-14 | End: 2018-03-14

## 2018-03-14 RX ORDER — MORPHINE SULFATE 2 MG/ML
2 INJECTION, SOLUTION INTRAMUSCULAR; INTRAVENOUS EVERY 4 HOURS PRN
Status: DISCONTINUED | OUTPATIENT
Start: 2018-03-14 | End: 2018-03-17 | Stop reason: HOSPADM

## 2018-03-14 RX ORDER — DIPHENHYDRAMINE HYDROCHLORIDE 50 MG/ML
12.5 INJECTION INTRAMUSCULAR; INTRAVENOUS
Status: DISCONTINUED | OUTPATIENT
Start: 2018-03-14 | End: 2018-03-14 | Stop reason: HOSPADM

## 2018-03-14 RX ORDER — NEOSTIGMINE METHYLSULFATE 5 MG/5 ML
SYRINGE (ML) INTRAVENOUS PRN
Status: DISCONTINUED | OUTPATIENT
Start: 2018-03-14 | End: 2018-03-14 | Stop reason: SDUPTHER

## 2018-03-14 RX ORDER — GLYCOPYRROLATE 1 MG/5 ML
SYRINGE (ML) INTRAVENOUS PRN
Status: DISCONTINUED | OUTPATIENT
Start: 2018-03-14 | End: 2018-03-14 | Stop reason: SDUPTHER

## 2018-03-14 RX ORDER — ONDANSETRON 2 MG/ML
4 INJECTION INTRAMUSCULAR; INTRAVENOUS
Status: DISCONTINUED | OUTPATIENT
Start: 2018-03-14 | End: 2018-03-14 | Stop reason: HOSPADM

## 2018-03-14 RX ORDER — PROPOFOL 10 MG/ML
INJECTION, EMULSION INTRAVENOUS PRN
Status: DISCONTINUED | OUTPATIENT
Start: 2018-03-14 | End: 2018-03-14 | Stop reason: SDUPTHER

## 2018-03-14 RX ORDER — MORPHINE SULFATE 2 MG/ML
2 INJECTION, SOLUTION INTRAMUSCULAR; INTRAVENOUS EVERY 5 MIN PRN
Status: DISCONTINUED | OUTPATIENT
Start: 2018-03-14 | End: 2018-03-14 | Stop reason: HOSPADM

## 2018-03-14 RX ADMIN — SODIUM CHLORIDE: 9 INJECTION, SOLUTION INTRAVENOUS at 18:25

## 2018-03-14 RX ADMIN — FENTANYL CITRATE 50 MCG: 50 INJECTION INTRAMUSCULAR; INTRAVENOUS at 11:16

## 2018-03-14 RX ADMIN — SODIUM CHLORIDE, POTASSIUM CHLORIDE, SODIUM LACTATE AND CALCIUM CHLORIDE: 600; 310; 30; 20 INJECTION, SOLUTION INTRAVENOUS at 10:08

## 2018-03-14 RX ADMIN — MORPHINE SULFATE 4 MG: 2 INJECTION, SOLUTION INTRAMUSCULAR; INTRAVENOUS at 13:56

## 2018-03-14 RX ADMIN — METRONIDAZOLE 500 MG: 500 TABLET ORAL at 14:21

## 2018-03-14 RX ADMIN — LIDOCAINE HYDROCHLORIDE 50 MG: 10 INJECTION, SOLUTION INFILTRATION; PERINEURAL at 11:10

## 2018-03-14 RX ADMIN — SODIUM CHLORIDE, POTASSIUM CHLORIDE, SODIUM LACTATE AND CALCIUM CHLORIDE: 600; 310; 30; 20 INJECTION, SOLUTION INTRAVENOUS at 11:43

## 2018-03-14 RX ADMIN — ROCURONIUM BROMIDE 3300 MG: 10 INJECTION INTRAVENOUS at 11:10

## 2018-03-14 RX ADMIN — Medication 3 MG: at 11:45

## 2018-03-14 RX ADMIN — MIDAZOLAM 2 MG: 1 INJECTION INTRAMUSCULAR; INTRAVENOUS at 11:06

## 2018-03-14 RX ADMIN — FENTANYL CITRATE 50 MCG: 50 INJECTION INTRAMUSCULAR; INTRAVENOUS at 11:10

## 2018-03-14 RX ADMIN — PROPOFOL 150 MG: 10 INJECTION, EMULSION INTRAVENOUS at 11:10

## 2018-03-14 RX ADMIN — TOPIRAMATE 50 MG: 25 TABLET, FILM COATED ORAL at 21:24

## 2018-03-14 RX ADMIN — BUPROPION HYDROCHLORIDE 150 MG: 150 TABLET, FILM COATED, EXTENDED RELEASE ORAL at 14:21

## 2018-03-14 RX ADMIN — SODIUM CHLORIDE: 9 INJECTION, SOLUTION INTRAVENOUS at 05:34

## 2018-03-14 RX ADMIN — MORPHINE SULFATE 2 MG: 2 INJECTION, SOLUTION INTRAMUSCULAR; INTRAVENOUS at 12:50

## 2018-03-14 RX ADMIN — ONDANSETRON 4 MG: 2 INJECTION INTRAMUSCULAR; INTRAVENOUS at 11:45

## 2018-03-14 RX ADMIN — Medication 0.6 MG: at 11:45

## 2018-03-14 RX ADMIN — MORPHINE SULFATE 2 MG: 2 INJECTION, SOLUTION INTRAMUSCULAR; INTRAVENOUS at 21:29

## 2018-03-14 ASSESSMENT — PULMONARY FUNCTION TESTS
PIF_VALUE: 18
PIF_VALUE: 19
PIF_VALUE: 14
PIF_VALUE: 18
PIF_VALUE: 2
PIF_VALUE: 4
PIF_VALUE: 15
PIF_VALUE: 0
PIF_VALUE: 19
PIF_VALUE: 1
PIF_VALUE: 0
PIF_VALUE: 19
PIF_VALUE: 2
PIF_VALUE: 18
PIF_VALUE: 0
PIF_VALUE: 18
PIF_VALUE: 19
PIF_VALUE: 16
PIF_VALUE: 0
PIF_VALUE: 1
PIF_VALUE: 18
PIF_VALUE: 18
PIF_VALUE: 17
PIF_VALUE: 14
PIF_VALUE: 22
PIF_VALUE: 2
PIF_VALUE: 19
PIF_VALUE: 9
PIF_VALUE: 18
PIF_VALUE: 19
PIF_VALUE: 2
PIF_VALUE: 17
PIF_VALUE: 19
PIF_VALUE: 19
PIF_VALUE: 17
PIF_VALUE: 19
PIF_VALUE: 19
PIF_VALUE: 17
PIF_VALUE: 19
PIF_VALUE: 16
PIF_VALUE: 5
PIF_VALUE: 18
PIF_VALUE: 15
PIF_VALUE: 2
PIF_VALUE: 19
PIF_VALUE: 19
PIF_VALUE: 2
PIF_VALUE: 1

## 2018-03-14 ASSESSMENT — PAIN DESCRIPTION - PAIN TYPE
TYPE: ACUTE PAIN
TYPE: SURGICAL PAIN
TYPE: ACUTE PAIN
TYPE: SURGICAL PAIN

## 2018-03-14 ASSESSMENT — PAIN SCALES - GENERAL
PAINLEVEL_OUTOF10: 8
PAINLEVEL_OUTOF10: 6
PAINLEVEL_OUTOF10: 4
PAINLEVEL_OUTOF10: 0
PAINLEVEL_OUTOF10: 6
PAINLEVEL_OUTOF10: 4
PAINLEVEL_OUTOF10: 7

## 2018-03-14 ASSESSMENT — PAIN DESCRIPTION - LOCATION
LOCATION: ABDOMEN

## 2018-03-14 NOTE — PLAN OF CARE
Problem: Pain:  Goal: Control of acute pain  Control of acute pain   Outcome: Ongoing  Adequate pain control achieved this shift. See MAR. Problem: Falls - Risk of  Goal: Absence of falls  Outcome: Ongoing  Pt. Free of falls and injuries this shift. Problem: Nutrition  Goal: Optimal nutrition therapy  Outcome: Ongoing  Pt eating and drinking appropriatly. Will continue to monitor.

## 2018-03-14 NOTE — PROGRESS NOTES
status: Alert, oriented, thought content appropriate    Assessment    Active Problems:    Post traumatic stress disorder (PTSD)    Lumbar disc disease    Chronic nonintractable headache    Liver enzyme elevation    Total bilirubin, elevated    Epigastric pain    Right upper quadrant abdominal pain    Elevated liver enzymes    Moderate malnutrition (HCC)    Chronic cholecystitis  Resolved Problems:    * No resolved hospital problems. *        Patient Active Problem List:     Uveitis     Juvenile rheumatoid arthritis ([address]     Cluster headache     HSP (Henoch Schonlein purpura) (HCC)     Post traumatic stress disorder (PTSD)     History of sepsis     Hyperglycemia     Chronic bilateral low back pain with sciatica     Generalized abdominal pain     Lumbar disc disease     Chronic nonintractable headache     Liver enzyme elevation     Total bilirubin, elevated     Epigastric pain     Right upper quadrant abdominal pain     Elevated liver enzymes     Moderate malnutrition ([address]      Plan:    1. Cont IV Fluids    2. Cont oral ATB's per GI    3. Cont Pain control    4. For ERCP today    5. Will consult General Surgery for possible cholecystectomy? 6. Monitor labs    7.  Increase activity      Electronically signed by Genevive Galeazzi, MD on 3/14/2018 at 12:13 PM

## 2018-03-14 NOTE — CONSULTS
(PRED FORTE) 1 % ophthalmic suspension Apply 1 drop to eye every hour as needed Left eye 4/25/17  Yes Historical Provider, MD     Allergies  is allergic to xeljanz [tofacitinib] and ceclor [cefaclor]. Family History  family history includes Breast Cancer in her maternal aunt; Diabetes in her maternal grandmother; High Blood Pressure in her mother; Prostate Cancer in her paternal grandfather; Stroke in her maternal grandfather. Social History   reports that she quit smoking 9 days ago. Her smoking use included Cigarettes. She started smoking about 3 years ago. She smoked 0.25 packs per day. She has never used smokeless tobacco. She reports that she drinks alcohol. She reports that she uses drugs, including Marijuana. Review of Systems:  General Denies any fever or chills  HEENT Denies any diplopia, tinnitus or vertigo  Resp Denies any shortness of breath, cough or wheezing  Cardiac Denies any chest pain, palpitations, claudication or edema  GI Denies any melena, hematochezia, hematemesis or pyrosis   Denies any frequency, urgency, hesitancy or incontinence  Heme Denies bruising or bleeding easily  Endocrine Denies any history of diabetes or thyroid disease  Neuro Denies any focal motor or sensory deficits    OBJECTIVE:   VITALS:  height is 5' 2\" (1.575 m) and weight is 179 lb 0.2 oz (81.2 kg). Her oral temperature is 98.4 °F (36.9 °C). Her blood pressure is 107/56 (abnormal) and her pulse is 69. Her respiration is 18 and oxygen saturation is 99%. CONSTITUTIONAL: Alert and oriented times 3, no acute distress and cooperative to examination with proper mood and affect. SKIN: Skin color, texture, turgor normal. No rashes or lesions. LYMPH: no cervical nodes, no inguinal nodes  HEENT: Head is normocephalic, atraumatic.  EOMI, PERRLA  NECK: Supple, symmetrical, trachea midline, no adenopathy, thyroid symmetric, not enlarged and no tenderness, skin normal  CHEST/LUNGS: chest symmetric with normal A/P diameter, Provided Reason for Exam: ruq/epigastric pain since 10 am Acuity: Acute Type of Exam: Initial FINDINGS: LIVER:  Image portions of the liver show no focal abnormality. BILIARY SYSTEM:  There appear to be tiny gallstones in the region of the gallbladder neck without current sonographic findings of acute cholecystitis. The patient did not have a positive sonographic Teague's sign. Common bile duct is within normal limits measuring 4.7 mm. RIGHT KIDNEY: No obstruction of the right kidney as visualized. PANCREAS:  Visualized portions of the pancreas are unremarkable. The pancreas is largely obscured by overlying bowel gas. OTHER: No evidence of right upper quadrant ascites. Tiny gallstones without current sonographic findings of acute cholecystitis. Xr Chest Portable    Result Date: 3/12/2018  EXAMINATION: SINGLE VIEW OF THE CHEST 3/12/2018 4:58 pm COMPARISON: None. HISTORY: ORDERING SYSTEM PROVIDED HISTORY: chest pain TECHNOLOGIST PROVIDED HISTORY: Reason for exam:->chest pain Ordering Physician Provided Reason for Exam: chest pain Acuity: Acute Type of Exam: Initial Additional signs and symptoms: Chest and upper abdomen pain today Relevant Medical/Surgical History: Chest and upper abdomen pain today FINDINGS: The lungs are without acute focal process. There is a poorly aerated. There is no effusion or pneumothorax. The cardiomediastinal silhouette is without acute process. The osseous structures are without acute process. Low lung volumes without evidence of acute infiltrates or signs of congestion. IMPRESSION:   1. Symptomatic cholelithiasis/chronic cholecystitis  2. Possible choledocholithiasis ERCP today     does not have any pertinent problems on file. PLAN:   1. Continue bowel rest IV hydration. Await ERCP results. Patient will need cholecystectomy. Discussed with patient. Thank you for this interesting consult and for allowing us to participate in the care of this patient.  If you

## 2018-03-14 NOTE — PLAN OF CARE
Problem: Pain:  Intervention: Promote participation in pain management plan  Adequate pain control achieved this shift. See MAR. Goal: Pain level will decrease  Pain level will decrease   Outcome: Ongoing    Goal: Control of acute pain  Control of acute pain   Outcome: Ongoing    Goal: Control of chronic pain  Control of chronic pain   Outcome: Ongoing      Problem: Falls - Risk of  Intervention: Fall precautions  Pt. Free of falls and injuries this shift.     Goal: Absence of falls  Outcome: Ongoing      Problem: Nutrition  Goal: Optimal nutrition therapy  Outcome: Ongoing

## 2018-03-14 NOTE — CARE COORDINATION
ONGOING DISCHARGE PLAN:    Writer unable to meet with patient as she is off the floor for ERCP. Per previous d/c planner's notes, plan is for patient to be discharged home without needs. Likely need lap cyndi. Will continue to follow.     Electronically signed by Una Bob RN on 3/14/2018 at 12:59 PM

## 2018-03-15 ENCOUNTER — ANESTHESIA EVENT (OUTPATIENT)
Dept: OPERATING ROOM | Age: 28
DRG: 263 | End: 2018-03-15
Payer: COMMERCIAL

## 2018-03-15 LAB
ALBUMIN SERPL-MCNC: 4.2 G/DL (ref 3.5–5.2)
ALBUMIN/GLOBULIN RATIO: ABNORMAL (ref 1–2.5)
ALP BLD-CCNC: 274 U/L (ref 35–104)
ALT SERPL-CCNC: 221 U/L (ref 5–33)
AMYLASE: 43 U/L (ref 28–100)
ANION GAP SERPL CALCULATED.3IONS-SCNC: 15 MMOL/L (ref 9–17)
AST SERPL-CCNC: 54 U/L
BILIRUB SERPL-MCNC: 0.44 MG/DL (ref 0.3–1.2)
BUN BLDV-MCNC: 7 MG/DL (ref 6–20)
BUN/CREAT BLD: ABNORMAL (ref 9–20)
CALCIUM SERPL-MCNC: 9 MG/DL (ref 8.6–10.4)
CHLORIDE BLD-SCNC: 102 MMOL/L (ref 98–107)
CO2: 20 MMOL/L (ref 20–31)
CREAT SERPL-MCNC: 0.97 MG/DL (ref 0.5–0.9)
GFR AFRICAN AMERICAN: >60 ML/MIN
GFR NON-AFRICAN AMERICAN: >60 ML/MIN
GFR SERPL CREATININE-BSD FRML MDRD: ABNORMAL ML/MIN/{1.73_M2}
GFR SERPL CREATININE-BSD FRML MDRD: ABNORMAL ML/MIN/{1.73_M2}
GLUCOSE BLD-MCNC: 102 MG/DL (ref 70–99)
HCT VFR BLD CALC: 40.2 % (ref 36–46)
HEMOGLOBIN: 13.4 G/DL (ref 12–16)
LIPASE: 25 U/L (ref 13–60)
MCH RBC QN AUTO: 28.7 PG (ref 26–34)
MCHC RBC AUTO-ENTMCNC: 33.4 G/DL (ref 31–37)
MCV RBC AUTO: 86 FL (ref 80–100)
NRBC AUTOMATED: NORMAL PER 100 WBC
PDW BLD-RTO: 14.6 % (ref 11.5–14.9)
PLATELET # BLD: 255 K/UL (ref 150–450)
PMV BLD AUTO: 8 FL (ref 6–12)
POTASSIUM SERPL-SCNC: 3.6 MMOL/L (ref 3.7–5.3)
RBC # BLD: 4.68 M/UL (ref 4–5.2)
SODIUM BLD-SCNC: 137 MMOL/L (ref 135–144)
TOTAL PROTEIN: 7.2 G/DL (ref 6.4–8.3)
WBC # BLD: 6.6 K/UL (ref 3.5–11)

## 2018-03-15 PROCEDURE — 80053 COMPREHEN METABOLIC PANEL: CPT

## 2018-03-15 PROCEDURE — 6360000002 HC RX W HCPCS: Performed by: FAMILY MEDICINE

## 2018-03-15 PROCEDURE — 99232 SBSQ HOSP IP/OBS MODERATE 35: CPT | Performed by: FAMILY MEDICINE

## 2018-03-15 PROCEDURE — 99232 SBSQ HOSP IP/OBS MODERATE 35: CPT | Performed by: INTERNAL MEDICINE

## 2018-03-15 PROCEDURE — 6370000000 HC RX 637 (ALT 250 FOR IP): Performed by: FAMILY MEDICINE

## 2018-03-15 PROCEDURE — 2580000003 HC RX 258: Performed by: FAMILY MEDICINE

## 2018-03-15 PROCEDURE — 82150 ASSAY OF AMYLASE: CPT

## 2018-03-15 PROCEDURE — 85027 COMPLETE CBC AUTOMATED: CPT

## 2018-03-15 PROCEDURE — 83690 ASSAY OF LIPASE: CPT

## 2018-03-15 PROCEDURE — 1200000000 HC SEMI PRIVATE

## 2018-03-15 PROCEDURE — 36415 COLL VENOUS BLD VENIPUNCTURE: CPT

## 2018-03-15 RX ORDER — POTASSIUM CHLORIDE 20 MEQ/1
40 TABLET, EXTENDED RELEASE ORAL ONCE
Status: COMPLETED | OUTPATIENT
Start: 2018-03-15 | End: 2018-03-15

## 2018-03-15 RX ADMIN — MORPHINE SULFATE 2 MG: 2 INJECTION, SOLUTION INTRAMUSCULAR; INTRAVENOUS at 08:43

## 2018-03-15 RX ADMIN — POTASSIUM CHLORIDE 40 MEQ: 20 TABLET, EXTENDED RELEASE ORAL at 08:43

## 2018-03-15 RX ADMIN — SODIUM CHLORIDE: 9 INJECTION, SOLUTION INTRAVENOUS at 01:36

## 2018-03-15 RX ADMIN — MORPHINE SULFATE 2 MG: 2 INJECTION, SOLUTION INTRAMUSCULAR; INTRAVENOUS at 21:15

## 2018-03-15 RX ADMIN — TOPIRAMATE 50 MG: 25 TABLET, FILM COATED ORAL at 21:14

## 2018-03-15 RX ADMIN — TOPIRAMATE 50 MG: 25 TABLET, FILM COATED ORAL at 08:46

## 2018-03-15 RX ADMIN — SERTRALINE HYDROCHLORIDE 100 MG: 100 TABLET ORAL at 08:43

## 2018-03-15 RX ADMIN — BUPROPION HYDROCHLORIDE 150 MG: 150 TABLET, FILM COATED, EXTENDED RELEASE ORAL at 08:43

## 2018-03-15 RX ADMIN — SODIUM CHLORIDE: 9 INJECTION, SOLUTION INTRAVENOUS at 21:14

## 2018-03-15 ASSESSMENT — PAIN SCALES - GENERAL
PAINLEVEL_OUTOF10: 3
PAINLEVEL_OUTOF10: 6
PAINLEVEL_OUTOF10: 6
PAINLEVEL_OUTOF10: 3

## 2018-03-15 NOTE — ANESTHESIA PRE PROCEDURE
0.9 % sodium chloride infusion   Intravenous Continuous Desmond Ch  mL/hr at 03/15/18 0136      enoxaparin (LOVENOX) injection 40 mg  40 mg Subcutaneous Daily Desmond Ch MD   Stopped at 03/14/18 5020    acetaminophen (TYLENOL) tablet 650 mg  650 mg Oral Q4H PRN Desmond Ch MD        [facility] injection 4 mg  4 mg Intravenous Q4H PRN Desmond Ch MD   4 mg at 03/14/18 1145    fentaNYL (SUBLIMAZE) injection 50 mcg  50 mcg Intravenous Q4H PRN Desmond Ch MD        buPROPion (WELLBUTRIN XL) extended release tablet 150 mg  150 mg Oral QAM Desmond Ch MD   150 mg at 03/15/18 0843    prednisoLONE acetate (PRED FORTE) 1 % ophthalmic suspension 1 drop  1 drop Both Eyes Q1H PRN Desmond Ch MD        sertraline (ZOLOFT) tablet 100 mg  100 mg Oral Daily Desmond Ch MD   100 mg at 03/15/18 0843    traZODone (DESYREL) tablet 50 mg  50 mg Oral Nightly PRN Desmond Ch MD        topiramate (TOPAMAX) tablet 50 mg  50 mg Oral BID Desmond Ch MD   50 mg at 03/15/18 4520       Allergies:     Allergies   Allergen Reactions   Jelena Mendoza [Tofacitinib] Anaphylaxis and Other (See Comments)     gastritis    Ceclor [Cefaclor] Hives       Problem List:    Patient Active Problem List   Diagnosis Code    Uveitis H20.9    Juvenile rheumatoid arthritis ([facility] [address] M08.00    Cluster headache G44.009    HSP (Henoch Schonlein purpura) (HCC) D69.0    Post traumatic stress disorder (PTSD) F43.10    History of sepsis Z86.19    Hyperglycemia R73.9    Chronic bilateral low back pain with sciatica M54.40, G89.29    Generalized abdominal pain R10.84    Lumbar disc disease M51.9    Chronic nonintractable headache R51    Liver enzyme elevation R74.8    Total bilirubin, elevated R17    Epigastric pain R10.13    Right upper quadrant abdominal pain R10.11    Elevated liver enzymes R74.8    Moderate malnutrition (HCC) E44.0    Chronic cholecystitis K81.1       Past Medical

## 2018-03-15 NOTE — PROGRESS NOTES
6.0 - 12.0 fL    NRBC Automated NOT REPORTED per 100 WBC   LIPASE    Collection Time: 03/15/18  6:12 AM   Result Value Ref Range    Lipase 25 13 - 60 U/L   AMYLASE    Collection Time: 03/15/18  6:12 AM   Result Value Ref Range    Amylase 43 28 - 100 U/L       ROS: As per HPI. Rest of ROS is negative    Objective:   Vitals: BP (!) 106/59   Pulse 84   Temp 97.7 °F (36.5 °C) (Oral)   Resp 16   Ht 5' 2\" (1.575 m)   Wt 179 lb 0.2 oz (81.2 kg)   SpO2 99%   BMI 32.74 kg/m²   General appearance: alert and cooperative with exam  Lungs: clear to auscultation bilaterally  Heart: regular rate and rhythm, S1, S2 normal, no murmur, click, rub or gallop  Abdomen: normal findings: bowel sounds normal and abnormal findings:  tenderness mild in the upper abdomen  Extremities: extremities normal, atraumatic, no cyanosis or edema and Homans sign is negative, no sign of DVT  Neurologic: Mental status: Alert, oriented, thought content appropriate    Assessment    Active Problems:    Post traumatic stress disorder (PTSD)    Lumbar disc disease    Chronic nonintractable headache    Liver enzyme elevation    Total bilirubin, elevated    Epigastric pain    Right upper quadrant abdominal pain    Elevated liver enzymes    Moderate malnutrition (HCC)    Chronic cholecystitis  Resolved Problems:    * No resolved hospital problems. *        Patient Active Problem List:     Uveitis     Juvenile rheumatoid arthritis ([facility] [address]     Cluster headache     HSP (Henoch Schonlein purpura) (HCC)     Post traumatic stress disorder (PTSD)     History of sepsis     Hyperglycemia     Chronic bilateral low back pain with sciatica     Generalized abdominal pain     Lumbar disc disease     Chronic nonintractable headache     Liver enzyme elevation     Total bilirubin, elevated     Epigastric pain     Right upper quadrant abdominal pain     Elevated liver enzymes     Moderate malnutrition (HCC)     Chronic cholecystitis      Plan:    1. Cont oral ATB's    2.

## 2018-03-15 NOTE — PROGRESS NOTES
Communicated with DR. Roberts regarding pt's antibiotics being on hold from previous day due to diarrhea epside. A miscommunication in which provider held the antibiotics led to them being given during the day on 3/14. Dr. Paresh Osborne would still like the antibiotics to be held. Will continue to follow.

## 2018-03-15 NOTE — PROGRESS NOTES
Patient was seen and examined. She is doing well. Afebrile hemodynamically stable. Abdomen is benign. Extremity nontender. ERCP results noted. For robotic cholecystectomy tomorrow.

## 2018-03-16 ENCOUNTER — ANESTHESIA (OUTPATIENT)
Dept: OPERATING ROOM | Age: 28
DRG: 263 | End: 2018-03-16
Payer: COMMERCIAL

## 2018-03-16 VITALS — SYSTOLIC BLOOD PRESSURE: 173 MMHG | DIASTOLIC BLOOD PRESSURE: 73 MMHG | TEMPERATURE: 75 F | OXYGEN SATURATION: 99 %

## 2018-03-16 LAB
ALBUMIN SERPL-MCNC: 3.9 G/DL (ref 3.5–5.2)
ALBUMIN/GLOBULIN RATIO: ABNORMAL (ref 1–2.5)
ALP BLD-CCNC: 200 U/L (ref 35–104)
ALT SERPL-CCNC: 131 U/L (ref 5–33)
ANION GAP SERPL CALCULATED.3IONS-SCNC: 12 MMOL/L (ref 9–17)
AST SERPL-CCNC: 27 U/L
BILIRUB SERPL-MCNC: 0.28 MG/DL (ref 0.3–1.2)
BUN BLDV-MCNC: 5 MG/DL (ref 6–20)
BUN/CREAT BLD: ABNORMAL (ref 9–20)
CALCIUM SERPL-MCNC: 8.9 MG/DL (ref 8.6–10.4)
CHLORIDE BLD-SCNC: 108 MMOL/L (ref 98–107)
CO2: 22 MMOL/L (ref 20–31)
CREAT SERPL-MCNC: 0.91 MG/DL (ref 0.5–0.9)
GFR AFRICAN AMERICAN: >60 ML/MIN
GFR NON-AFRICAN AMERICAN: >60 ML/MIN
GFR SERPL CREATININE-BSD FRML MDRD: ABNORMAL ML/MIN/{1.73_M2}
GFR SERPL CREATININE-BSD FRML MDRD: ABNORMAL ML/MIN/{1.73_M2}
GLUCOSE BLD-MCNC: 90 MG/DL (ref 70–99)
HCT VFR BLD CALC: 38.2 % (ref 36–46)
HEMOGLOBIN: 12.4 G/DL (ref 12–16)
MCH RBC QN AUTO: 28.4 PG (ref 26–34)
MCHC RBC AUTO-ENTMCNC: 32.6 G/DL (ref 31–37)
MCV RBC AUTO: 87.1 FL (ref 80–100)
NRBC AUTOMATED: NORMAL PER 100 WBC
PDW BLD-RTO: 14.5 % (ref 11.5–14.9)
PLATELET # BLD: 192 K/UL (ref 150–450)
PMV BLD AUTO: 7.7 FL (ref 6–12)
POTASSIUM SERPL-SCNC: 4.1 MMOL/L (ref 3.7–5.3)
RBC # BLD: 4.39 M/UL (ref 4–5.2)
SODIUM BLD-SCNC: 142 MMOL/L (ref 135–144)
TOTAL PROTEIN: 6.6 G/DL (ref 6.4–8.3)
WBC # BLD: 4.9 K/UL (ref 3.5–11)

## 2018-03-16 PROCEDURE — 2580000003 HC RX 258: Performed by: SURGERY

## 2018-03-16 PROCEDURE — S2900 ROBOTIC SURGICAL SYSTEM: HCPCS | Performed by: SURGERY

## 2018-03-16 PROCEDURE — 7100000001 HC PACU RECOVERY - ADDTL 15 MIN: Performed by: SURGERY

## 2018-03-16 PROCEDURE — 6360000002 HC RX W HCPCS: Performed by: ANESTHESIOLOGY

## 2018-03-16 PROCEDURE — 6360000002 HC RX W HCPCS: Performed by: FAMILY MEDICINE

## 2018-03-16 PROCEDURE — 99232 SBSQ HOSP IP/OBS MODERATE 35: CPT | Performed by: INTERNAL MEDICINE

## 2018-03-16 PROCEDURE — A6402 STERILE GAUZE <= 16 SQ IN: HCPCS | Performed by: SURGERY

## 2018-03-16 PROCEDURE — 7100000000 HC PACU RECOVERY - FIRST 15 MIN: Performed by: SURGERY

## 2018-03-16 PROCEDURE — 0FT44ZZ RESECTION OF GALLBLADDER, PERCUTANEOUS ENDOSCOPIC APPROACH: ICD-10-PCS | Performed by: SURGERY

## 2018-03-16 PROCEDURE — 3700000000 HC ANESTHESIA ATTENDED CARE: Performed by: SURGERY

## 2018-03-16 PROCEDURE — 6360000002 HC RX W HCPCS: Performed by: SURGERY

## 2018-03-16 PROCEDURE — 2500000003 HC RX 250 WO HCPCS: Performed by: ANESTHESIOLOGY

## 2018-03-16 PROCEDURE — 6370000000 HC RX 637 (ALT 250 FOR IP): Performed by: FAMILY MEDICINE

## 2018-03-16 PROCEDURE — 36415 COLL VENOUS BLD VENIPUNCTURE: CPT

## 2018-03-16 PROCEDURE — 2580000003 HC RX 258: Performed by: FAMILY MEDICINE

## 2018-03-16 PROCEDURE — 3600000019 HC SURGERY ROBOT ADDTL 15MIN: Performed by: SURGERY

## 2018-03-16 PROCEDURE — 3600000009 HC SURGERY ROBOT BASE: Performed by: SURGERY

## 2018-03-16 PROCEDURE — 85027 COMPLETE CBC AUTOMATED: CPT

## 2018-03-16 PROCEDURE — 6370000000 HC RX 637 (ALT 250 FOR IP): Performed by: SURGERY

## 2018-03-16 PROCEDURE — 2500000003 HC RX 250 WO HCPCS: Performed by: NURSE ANESTHETIST, CERTIFIED REGISTERED

## 2018-03-16 PROCEDURE — 94762 N-INVAS EAR/PLS OXIMTRY CONT: CPT

## 2018-03-16 PROCEDURE — 8E0W4CZ ROBOTIC ASSISTED PROCEDURE OF TRUNK REGION, PERCUTANEOUS ENDOSCOPIC APPROACH: ICD-10-PCS | Performed by: SURGERY

## 2018-03-16 PROCEDURE — 2720000010 HC SURG SUPPLY STERILE: Performed by: SURGERY

## 2018-03-16 PROCEDURE — 3700000001 HC ADD 15 MINUTES (ANESTHESIA): Performed by: SURGERY

## 2018-03-16 PROCEDURE — 88304 TISSUE EXAM BY PATHOLOGIST: CPT

## 2018-03-16 PROCEDURE — 99232 SBSQ HOSP IP/OBS MODERATE 35: CPT | Performed by: FAMILY MEDICINE

## 2018-03-16 PROCEDURE — 6360000002 HC RX W HCPCS: Performed by: NURSE ANESTHETIST, CERTIFIED REGISTERED

## 2018-03-16 PROCEDURE — 1200000000 HC SEMI PRIVATE

## 2018-03-16 PROCEDURE — 80053 COMPREHEN METABOLIC PANEL: CPT

## 2018-03-16 PROCEDURE — 2500000003 HC RX 250 WO HCPCS: Performed by: SURGERY

## 2018-03-16 RX ORDER — MORPHINE SULFATE 2 MG/ML
2 INJECTION, SOLUTION INTRAMUSCULAR; INTRAVENOUS EVERY 5 MIN PRN
Status: DISCONTINUED | OUTPATIENT
Start: 2018-03-16 | End: 2018-03-16 | Stop reason: HOSPADM

## 2018-03-16 RX ORDER — ONDANSETRON 2 MG/ML
INJECTION INTRAMUSCULAR; INTRAVENOUS PRN
Status: DISCONTINUED | OUTPATIENT
Start: 2018-03-16 | End: 2018-03-16 | Stop reason: SDUPTHER

## 2018-03-16 RX ORDER — HYDROCODONE BITARTRATE AND ACETAMINOPHEN 5; 325 MG/1; MG/1
2 TABLET ORAL PRN
Status: DISCONTINUED | OUTPATIENT
Start: 2018-03-16 | End: 2018-03-16 | Stop reason: HOSPADM

## 2018-03-16 RX ORDER — HYDROCODONE BITARTRATE AND ACETAMINOPHEN 5; 325 MG/1; MG/1
1 TABLET ORAL PRN
Status: DISCONTINUED | OUTPATIENT
Start: 2018-03-16 | End: 2018-03-16 | Stop reason: HOSPADM

## 2018-03-16 RX ORDER — DIAZEPAM 5 MG/ML
5 INJECTION, SOLUTION INTRAMUSCULAR; INTRAVENOUS ONCE
Status: DISCONTINUED | OUTPATIENT
Start: 2018-03-16 | End: 2018-03-16

## 2018-03-16 RX ORDER — FENTANYL CITRATE 50 UG/ML
25 INJECTION, SOLUTION INTRAMUSCULAR; INTRAVENOUS EVERY 5 MIN PRN
Status: DISCONTINUED | OUTPATIENT
Start: 2018-03-16 | End: 2018-03-16 | Stop reason: HOSPADM

## 2018-03-16 RX ORDER — BUPIVACAINE HYDROCHLORIDE 5 MG/ML
INJECTION, SOLUTION EPIDURAL; INTRACAUDAL PRN
Status: DISCONTINUED | OUTPATIENT
Start: 2018-03-16 | End: 2018-03-16 | Stop reason: HOSPADM

## 2018-03-16 RX ORDER — HYDRALAZINE HYDROCHLORIDE 20 MG/ML
5 INJECTION INTRAMUSCULAR; INTRAVENOUS EVERY 10 MIN PRN
Status: DISCONTINUED | OUTPATIENT
Start: 2018-03-16 | End: 2018-03-16 | Stop reason: HOSPADM

## 2018-03-16 RX ORDER — DIPHENHYDRAMINE HYDROCHLORIDE 50 MG/ML
12.5 INJECTION INTRAMUSCULAR; INTRAVENOUS
Status: DISCONTINUED | OUTPATIENT
Start: 2018-03-16 | End: 2018-03-16 | Stop reason: HOSPADM

## 2018-03-16 RX ORDER — FENTANYL CITRATE 50 UG/ML
INJECTION, SOLUTION INTRAMUSCULAR; INTRAVENOUS PRN
Status: DISCONTINUED | OUTPATIENT
Start: 2018-03-16 | End: 2018-03-16 | Stop reason: SDUPTHER

## 2018-03-16 RX ORDER — OXYCODONE HYDROCHLORIDE AND ACETAMINOPHEN 5; 325 MG/1; MG/1
1 TABLET ORAL EVERY 6 HOURS PRN
Qty: 28 TABLET | Refills: 0 | Status: SHIPPED | OUTPATIENT
Start: 2018-03-16 | End: 2018-03-23

## 2018-03-16 RX ORDER — DIAZEPAM 5 MG/ML
5 INJECTION, SOLUTION INTRAMUSCULAR; INTRAVENOUS ONCE
Status: COMPLETED | OUTPATIENT
Start: 2018-03-16 | End: 2018-03-16

## 2018-03-16 RX ORDER — FENTANYL CITRATE 50 UG/ML
25 INJECTION, SOLUTION INTRAMUSCULAR; INTRAVENOUS EVERY 5 MIN PRN
Status: COMPLETED | OUTPATIENT
Start: 2018-03-16 | End: 2018-03-16

## 2018-03-16 RX ORDER — SODIUM CHLORIDE 9 MG/ML
INJECTION, SOLUTION INTRAVENOUS CONTINUOUS
Status: CANCELLED | OUTPATIENT
Start: 2018-03-16

## 2018-03-16 RX ORDER — DEXAMETHASONE SODIUM PHOSPHATE 4 MG/ML
INJECTION, SOLUTION INTRA-ARTICULAR; INTRALESIONAL; INTRAMUSCULAR; INTRAVENOUS; SOFT TISSUE PRN
Status: DISCONTINUED | OUTPATIENT
Start: 2018-03-16 | End: 2018-03-16 | Stop reason: SDUPTHER

## 2018-03-16 RX ORDER — 0.9 % SODIUM CHLORIDE 0.9 %
500 INTRAVENOUS SOLUTION INTRAVENOUS
Status: DISCONTINUED | OUTPATIENT
Start: 2018-03-16 | End: 2018-03-16 | Stop reason: HOSPADM

## 2018-03-16 RX ORDER — ACETAMINOPHEN 325 MG/1
650 TABLET ORAL EVERY 4 HOURS PRN
Status: DISCONTINUED | OUTPATIENT
Start: 2018-03-16 | End: 2018-03-17 | Stop reason: HOSPADM

## 2018-03-16 RX ORDER — NEOSTIGMINE METHYLSULFATE 1 MG/ML
INJECTION, SOLUTION INTRAVENOUS PRN
Status: DISCONTINUED | OUTPATIENT
Start: 2018-03-16 | End: 2018-03-16 | Stop reason: SDUPTHER

## 2018-03-16 RX ORDER — ONDANSETRON 4 MG/1
TABLET, FILM COATED ORAL
Qty: 20 TABLET | Refills: 0 | Status: SHIPPED | OUTPATIENT
Start: 2018-03-16 | End: 2018-07-02 | Stop reason: ALTCHOICE

## 2018-03-16 RX ORDER — LIDOCAINE HYDROCHLORIDE 10 MG/ML
INJECTION, SOLUTION EPIDURAL; INFILTRATION; INTRACAUDAL; PERINEURAL PRN
Status: DISCONTINUED | OUTPATIENT
Start: 2018-03-16 | End: 2018-03-16 | Stop reason: SDUPTHER

## 2018-03-16 RX ORDER — SODIUM CHLORIDE 0.9 % (FLUSH) 0.9 %
10 SYRINGE (ML) INJECTION EVERY 12 HOURS SCHEDULED
Status: DISCONTINUED | OUTPATIENT
Start: 2018-03-16 | End: 2018-03-17 | Stop reason: HOSPADM

## 2018-03-16 RX ORDER — OXYCODONE HYDROCHLORIDE AND ACETAMINOPHEN 5; 325 MG/1; MG/1
1 TABLET ORAL EVERY 4 HOURS PRN
Status: DISCONTINUED | OUTPATIENT
Start: 2018-03-16 | End: 2018-03-17 | Stop reason: HOSPADM

## 2018-03-16 RX ORDER — CIPROFLOXACIN 500 MG/1
500 TABLET, FILM COATED ORAL 2 TIMES DAILY
Qty: 14 TABLET | Refills: 0 | Status: SHIPPED | OUTPATIENT
Start: 2018-03-16 | End: 2018-03-23

## 2018-03-16 RX ORDER — SODIUM CHLORIDE 0.9 % (FLUSH) 0.9 %
10 SYRINGE (ML) INJECTION PRN
Status: DISCONTINUED | OUTPATIENT
Start: 2018-03-16 | End: 2018-03-17 | Stop reason: HOSPADM

## 2018-03-16 RX ORDER — MEPERIDINE HYDROCHLORIDE 50 MG/ML
12.5 INJECTION INTRAMUSCULAR; INTRAVENOUS; SUBCUTANEOUS EVERY 5 MIN PRN
Status: DISCONTINUED | OUTPATIENT
Start: 2018-03-16 | End: 2018-03-16 | Stop reason: HOSPADM

## 2018-03-16 RX ORDER — CEFAZOLIN SODIUM 1 G/3ML
INJECTION, POWDER, FOR SOLUTION INTRAMUSCULAR; INTRAVENOUS
Status: DISPENSED
Start: 2018-03-16 | End: 2018-03-17

## 2018-03-16 RX ORDER — CIPROFLOXACIN 2 MG/ML
400 INJECTION, SOLUTION INTRAVENOUS EVERY 12 HOURS
Status: DISCONTINUED | OUTPATIENT
Start: 2018-03-16 | End: 2018-03-16

## 2018-03-16 RX ORDER — MIDAZOLAM HYDROCHLORIDE 1 MG/ML
INJECTION INTRAMUSCULAR; INTRAVENOUS PRN
Status: DISCONTINUED | OUTPATIENT
Start: 2018-03-16 | End: 2018-03-16 | Stop reason: SDUPTHER

## 2018-03-16 RX ORDER — METOCLOPRAMIDE HYDROCHLORIDE 5 MG/ML
10 INJECTION INTRAMUSCULAR; INTRAVENOUS EVERY 6 HOURS
Status: DISCONTINUED | OUTPATIENT
Start: 2018-03-16 | End: 2018-03-17 | Stop reason: HOSPADM

## 2018-03-16 RX ORDER — SODIUM CHLORIDE 9 MG/ML
INJECTION, SOLUTION INTRAVENOUS CONTINUOUS
Status: DISCONTINUED | OUTPATIENT
Start: 2018-03-16 | End: 2018-03-17 | Stop reason: HOSPADM

## 2018-03-16 RX ORDER — ONDANSETRON 2 MG/ML
4 INJECTION INTRAMUSCULAR; INTRAVENOUS EVERY 6 HOURS PRN
Status: DISCONTINUED | OUTPATIENT
Start: 2018-03-16 | End: 2018-03-17 | Stop reason: HOSPADM

## 2018-03-16 RX ORDER — ROCURONIUM BROMIDE 10 MG/ML
INJECTION, SOLUTION INTRAVENOUS PRN
Status: DISCONTINUED | OUTPATIENT
Start: 2018-03-16 | End: 2018-03-16 | Stop reason: SDUPTHER

## 2018-03-16 RX ORDER — OXYCODONE HYDROCHLORIDE AND ACETAMINOPHEN 5; 325 MG/1; MG/1
2 TABLET ORAL EVERY 4 HOURS PRN
Status: DISCONTINUED | OUTPATIENT
Start: 2018-03-16 | End: 2018-03-17 | Stop reason: HOSPADM

## 2018-03-16 RX ORDER — PROMETHAZINE HYDROCHLORIDE 25 MG/ML
6.25 INJECTION, SOLUTION INTRAMUSCULAR; INTRAVENOUS
Status: DISCONTINUED | OUTPATIENT
Start: 2018-03-16 | End: 2018-03-16 | Stop reason: HOSPADM

## 2018-03-16 RX ORDER — PROPOFOL 10 MG/ML
INJECTION, EMULSION INTRAVENOUS PRN
Status: DISCONTINUED | OUTPATIENT
Start: 2018-03-16 | End: 2018-03-16 | Stop reason: SDUPTHER

## 2018-03-16 RX ORDER — GLYCOPYRROLATE 0.2 MG/ML
INJECTION INTRAMUSCULAR; INTRAVENOUS PRN
Status: DISCONTINUED | OUTPATIENT
Start: 2018-03-16 | End: 2018-03-16 | Stop reason: SDUPTHER

## 2018-03-16 RX ORDER — SUCCINYLCHOLINE CHLORIDE 20 MG/ML
INJECTION INTRAMUSCULAR; INTRAVENOUS PRN
Status: DISCONTINUED | OUTPATIENT
Start: 2018-03-16 | End: 2018-03-16 | Stop reason: SDUPTHER

## 2018-03-16 RX ADMIN — ROCURONIUM BROMIDE 30 MG: 10 INJECTION INTRAVENOUS at 14:07

## 2018-03-16 RX ADMIN — FENTANYL CITRATE 25 MCG: 50 INJECTION, SOLUTION INTRAMUSCULAR; INTRAVENOUS at 16:44

## 2018-03-16 RX ADMIN — DIAZEPAM 5 MG: 5 INJECTION, SOLUTION INTRAMUSCULAR; INTRAVENOUS at 13:15

## 2018-03-16 RX ADMIN — SERTRALINE HYDROCHLORIDE 100 MG: 100 TABLET ORAL at 08:16

## 2018-03-16 RX ADMIN — SODIUM CHLORIDE: 9 INJECTION, SOLUTION INTRAVENOUS at 18:08

## 2018-03-16 RX ADMIN — Medication 2 G: at 14:04

## 2018-03-16 RX ADMIN — MIDAZOLAM 2 MG: 1 INJECTION INTRAMUSCULAR; INTRAVENOUS at 13:47

## 2018-03-16 RX ADMIN — Medication 10 ML: at 13:15

## 2018-03-16 RX ADMIN — FENTANYL CITRATE 25 MCG: 50 INJECTION, SOLUTION INTRAMUSCULAR; INTRAVENOUS at 16:33

## 2018-03-16 RX ADMIN — DEXAMETHASONE SODIUM PHOSPHATE 4 MG: 4 INJECTION, SOLUTION INTRAMUSCULAR; INTRAVENOUS at 15:53

## 2018-03-16 RX ADMIN — CEFAZOLIN 1 G: 1 INJECTION, POWDER, FOR SOLUTION INTRAMUSCULAR; INTRAVENOUS at 22:50

## 2018-03-16 RX ADMIN — NEOSTIGMINE METHYLSULFATE 3 MG: 1 INJECTION, SOLUTION INTRAVENOUS at 15:54

## 2018-03-16 RX ADMIN — TOPIRAMATE 50 MG: 25 TABLET, FILM COATED ORAL at 08:16

## 2018-03-16 RX ADMIN — MORPHINE SULFATE 4 MG: 2 INJECTION, SOLUTION INTRAMUSCULAR; INTRAVENOUS at 18:08

## 2018-03-16 RX ADMIN — GLYCOPYRROLATE 0.6 MG: 0.2 INJECTION, SOLUTION INTRAMUSCULAR; INTRAVENOUS at 15:54

## 2018-03-16 RX ADMIN — PROPOFOL 200 MG: 10 INJECTION, EMULSION INTRAVENOUS at 13:57

## 2018-03-16 RX ADMIN — FENTANYL CITRATE 100 MCG: 50 INJECTION INTRAMUSCULAR; INTRAVENOUS at 14:10

## 2018-03-16 RX ADMIN — FENTANYL CITRATE 25 MCG: 50 INJECTION, SOLUTION INTRAMUSCULAR; INTRAVENOUS at 16:38

## 2018-03-16 RX ADMIN — BUPROPION HYDROCHLORIDE 150 MG: 150 TABLET, FILM COATED, EXTENDED RELEASE ORAL at 08:16

## 2018-03-16 RX ADMIN — SODIUM CHLORIDE: 9 INJECTION, SOLUTION INTRAVENOUS at 14:49

## 2018-03-16 RX ADMIN — MORPHINE SULFATE 2 MG: 2 INJECTION, SOLUTION INTRAMUSCULAR; INTRAVENOUS at 11:33

## 2018-03-16 RX ADMIN — SUCCINYLCHOLINE CHLORIDE 160 MG: 20 INJECTION, SOLUTION INTRAMUSCULAR; INTRAVENOUS at 13:57

## 2018-03-16 RX ADMIN — METOCLOPRAMIDE 10 MG: 5 INJECTION, SOLUTION INTRAMUSCULAR; INTRAVENOUS at 21:27

## 2018-03-16 RX ADMIN — MORPHINE SULFATE 2 MG: 2 INJECTION, SOLUTION INTRAMUSCULAR; INTRAVENOUS at 01:05

## 2018-03-16 RX ADMIN — FENTANYL CITRATE 25 MCG: 50 INJECTION, SOLUTION INTRAMUSCULAR; INTRAVENOUS at 16:50

## 2018-03-16 RX ADMIN — SODIUM CHLORIDE: 9 INJECTION, SOLUTION INTRAVENOUS at 16:37

## 2018-03-16 RX ADMIN — ROCURONIUM BROMIDE 10 MG: 10 INJECTION INTRAVENOUS at 15:32

## 2018-03-16 RX ADMIN — MORPHINE SULFATE 2 MG: 2 INJECTION, SOLUTION INTRAMUSCULAR; INTRAVENOUS at 16:59

## 2018-03-16 RX ADMIN — OXYCODONE HYDROCHLORIDE AND ACETAMINOPHEN 2 TABLET: 5; 325 TABLET ORAL at 21:27

## 2018-03-16 RX ADMIN — SODIUM CHLORIDE: 9 INJECTION, SOLUTION INTRAVENOUS at 13:47

## 2018-03-16 RX ADMIN — LIDOCAINE HYDROCHLORIDE 50 MG: 10 INJECTION, SOLUTION EPIDURAL; INFILTRATION; INTRACAUDAL; PERINEURAL at 13:57

## 2018-03-16 RX ADMIN — FENTANYL CITRATE 100 MCG: 50 INJECTION INTRAMUSCULAR; INTRAVENOUS at 13:55

## 2018-03-16 RX ADMIN — FENTANYL CITRATE 50 MCG: 50 INJECTION INTRAMUSCULAR; INTRAVENOUS at 14:29

## 2018-03-16 RX ADMIN — ROCURONIUM BROMIDE 10 MG: 10 INJECTION INTRAVENOUS at 14:37

## 2018-03-16 RX ADMIN — ROCURONIUM BROMIDE 10 MG: 10 INJECTION INTRAVENOUS at 13:57

## 2018-03-16 RX ADMIN — ONDANSETRON 4 MG: 2 INJECTION INTRAMUSCULAR; INTRAVENOUS at 15:53

## 2018-03-16 RX ADMIN — ONDANSETRON 4 MG: 2 INJECTION INTRAMUSCULAR; INTRAVENOUS at 11:33

## 2018-03-16 RX ADMIN — TOPIRAMATE 50 MG: 25 TABLET, FILM COATED ORAL at 22:48

## 2018-03-16 RX ADMIN — DEXAMETHASONE SODIUM PHOSPHATE 4 MG: 4 INJECTION, SOLUTION INTRAMUSCULAR; INTRAVENOUS at 14:07

## 2018-03-16 ASSESSMENT — PULMONARY FUNCTION TESTS
PIF_VALUE: 21
PIF_VALUE: 20
PIF_VALUE: 29
PIF_VALUE: 27
PIF_VALUE: 29
PIF_VALUE: 24
PIF_VALUE: 19
PIF_VALUE: 29
PIF_VALUE: 21
PIF_VALUE: 11
PIF_VALUE: 20
PIF_VALUE: 19
PIF_VALUE: 19
PIF_VALUE: 27
PIF_VALUE: 29
PIF_VALUE: 1
PIF_VALUE: 31
PIF_VALUE: 20
PIF_VALUE: 29
PIF_VALUE: 2
PIF_VALUE: 25
PIF_VALUE: 29
PIF_VALUE: 30
PIF_VALUE: 19
PIF_VALUE: 32
PIF_VALUE: 21
PIF_VALUE: 30
PIF_VALUE: 19
PIF_VALUE: 16
PIF_VALUE: 21
PIF_VALUE: 18
PIF_VALUE: 30
PIF_VALUE: 19
PIF_VALUE: 30
PIF_VALUE: 20
PIF_VALUE: 19
PIF_VALUE: 29
PIF_VALUE: 29
PIF_VALUE: 31
PIF_VALUE: 29
PIF_VALUE: 19
PIF_VALUE: 21
PIF_VALUE: 19
PIF_VALUE: 20
PIF_VALUE: 1
PIF_VALUE: 28
PIF_VALUE: 20
PIF_VALUE: 29
PIF_VALUE: 21
PIF_VALUE: 30
PIF_VALUE: 22
PIF_VALUE: 22
PIF_VALUE: 21
PIF_VALUE: 7
PIF_VALUE: 28
PIF_VALUE: 29
PIF_VALUE: 21
PIF_VALUE: 29
PIF_VALUE: 29
PIF_VALUE: 21
PIF_VALUE: 19
PIF_VALUE: 28
PIF_VALUE: 21
PIF_VALUE: 27
PIF_VALUE: 29
PIF_VALUE: 30
PIF_VALUE: 29
PIF_VALUE: 19
PIF_VALUE: 28
PIF_VALUE: 30
PIF_VALUE: 19
PIF_VALUE: 20
PIF_VALUE: 21
PIF_VALUE: 29
PIF_VALUE: 30
PIF_VALUE: 1
PIF_VALUE: 30
PIF_VALUE: 0
PIF_VALUE: 27
PIF_VALUE: 22
PIF_VALUE: 19
PIF_VALUE: 19
PIF_VALUE: 0
PIF_VALUE: 21
PIF_VALUE: 19
PIF_VALUE: 29
PIF_VALUE: 4
PIF_VALUE: 21
PIF_VALUE: 29
PIF_VALUE: 23
PIF_VALUE: 23
PIF_VALUE: 28
PIF_VALUE: 30
PIF_VALUE: 0
PIF_VALUE: 28
PIF_VALUE: 29
PIF_VALUE: 30
PIF_VALUE: 19
PIF_VALUE: 29
PIF_VALUE: 29
PIF_VALUE: 22
PIF_VALUE: 19
PIF_VALUE: 21
PIF_VALUE: 1
PIF_VALUE: 29
PIF_VALUE: 21
PIF_VALUE: 28
PIF_VALUE: 21
PIF_VALUE: 22
PIF_VALUE: 20
PIF_VALUE: 21
PIF_VALUE: 2
PIF_VALUE: 22
PIF_VALUE: 29
PIF_VALUE: 20
PIF_VALUE: 27
PIF_VALUE: 29
PIF_VALUE: 24
PIF_VALUE: 27
PIF_VALUE: 28
PIF_VALUE: 29
PIF_VALUE: 30
PIF_VALUE: 29
PIF_VALUE: 20
PIF_VALUE: 29
PIF_VALUE: 29
PIF_VALUE: 19
PIF_VALUE: 31
PIF_VALUE: 21
PIF_VALUE: 20
PIF_VALUE: 18
PIF_VALUE: 28
PIF_VALUE: 19
PIF_VALUE: 29
PIF_VALUE: 19
PIF_VALUE: 30
PIF_VALUE: 30

## 2018-03-16 ASSESSMENT — PAIN DESCRIPTION - DESCRIPTORS: DESCRIPTORS: SHARP

## 2018-03-16 ASSESSMENT — PAIN DESCRIPTION - PAIN TYPE
TYPE: ACUTE PAIN
TYPE: SURGICAL PAIN

## 2018-03-16 ASSESSMENT — PAIN SCALES - GENERAL
PAINLEVEL_OUTOF10: 5
PAINLEVEL_OUTOF10: 8
PAINLEVEL_OUTOF10: 7
PAINLEVEL_OUTOF10: 3
PAINLEVEL_OUTOF10: 7
PAINLEVEL_OUTOF10: 5
PAINLEVEL_OUTOF10: 5
PAINLEVEL_OUTOF10: 6
PAINLEVEL_OUTOF10: 7
PAINLEVEL_OUTOF10: 9
PAINLEVEL_OUTOF10: 0
PAINLEVEL_OUTOF10: 7
PAINLEVEL_OUTOF10: 8
PAINLEVEL_OUTOF10: 9
PAINLEVEL_OUTOF10: 7

## 2018-03-16 ASSESSMENT — PAIN DESCRIPTION - LOCATION
LOCATION: ABDOMEN

## 2018-03-16 ASSESSMENT — PAIN - FUNCTIONAL ASSESSMENT: PAIN_FUNCTIONAL_ASSESSMENT: 0-10

## 2018-03-16 ASSESSMENT — PAIN DESCRIPTION - PROGRESSION: CLINICAL_PROGRESSION: GRADUALLY WORSENING

## 2018-03-16 ASSESSMENT — PAIN DESCRIPTION - ORIENTATION
ORIENTATION: MID
ORIENTATION: UPPER
ORIENTATION: MID

## 2018-03-16 NOTE — INTERVAL H&P NOTE
HISTORY and [address]       NAME:  Agustin Ornelas  MRN: [id number]   YOB: 1990   Date: 3/16/2018   Age: 33 y.o. Gender: female     H&P Update Note    H&P reviewed and updated, Patient examined. Vitals: /72   Pulse 84   Temp 97 °F (36.1 °C) (Oral)   Resp 18   Ht 5' 2\" (1.575 m)   Wt 179 lb 0.2 oz (81.2 kg)   SpO2 96%   BMI 32.74 kg/m²  Body mass index is 32.74 kg/m². No interval changes. I concur with the findings.        MARISSA LOFTON PA-C on 3/16/2018 at 1:47 PM

## 2018-03-16 NOTE — PROGRESS NOTES
Gastroenterology  Note      Patient: Raji Ballard  : 1990  Acct#:  [id number]     Date:  3/16/2018    Subjective:       History of Present Illness  Patient is a 33 y.o.  female admitted with Liver enzyme elevation [R74.8]  Elevated liver enzymes [R74.8] who is seen in consult for Abdominal pain and elevated liver enzymes      Awaiting the call to go to surgery for lap cyndi scheduled at 4:00   patient is doing very well   still having some pain  N.p.o.   LFTs are better       Past Medical History:   Diagnosis Date    Chronic sinus infection     Cluster headache     History of sepsis 2016    post C-Birth/partial    HSP (Henoch Schonlein purpura) ([facility] [address]     Juvenile rheumatoid arthritis ([facility] [address]     Dr. Jamil Pan traumatic stress disorder (PTSD)     Uveitis       Past Surgical History:   Procedure Laterality Date     SECTION  2016    EYE SURGERY Left     cataract with lens implant    HYSTERECTOMY  2016    partial    KNEE ARTHROSCOPY Right     X's 3 for meniscal tears    [place] ERCP DX COLLECTION SPECIMEN BRUSHING/WASHING N/A 3/14/2018    ERCP ENDOSCOPIC RETROGRADE CHOLANGIOPANCREATOGRAPHY WITH BALLOON SWEEP performed by Shannon Coleman MD at [address]        Past Endoscopic HistoryNone    Admission Meds  No current facility-administered medications on file prior to encounter.       Current Outpatient Prescriptions on File Prior to Encounter   Medication Sig Dispense Refill    ibuprofen (ADVIL;MOTRIN) 800 MG tablet Take 1 tablet by mouth every 8 hours as needed for Pain 30 tablet 0    topiramate (TOPAMAX) 50 MG tablet Take 1 tablet by mouth 2 times daily 60 tablet 3    sertraline (ZOLOFT) 100 MG tablet Take 1 tablet by mouth daily 30 tablet 3    traZODone (DESYREL) 50 MG tablet 1 to 2 tablets if needed nightly prn 60 tablet 5    buPROPion (WELLBUTRIN XL) 150 MG extended release tablet Take 1 tablet by mouth lymphadenopathy   Pulmonary/Chest: clear to auscultation bilaterally- no wheezes, rales or rhonchi, normal air movement, no respiratory distress  Cardiovascular: normal rate, regular rhythm, normal S1 and S2, no murmurs, rubs, clicks or gallops, distal pulses intact, no carotid bruits  Abdomen: soft,Tenderness in the right upper quadrant area and epigastric area, non-distended, normal bowel sounds, no masses or organomegaly  Extremities: no cyanosis, clubbing or edema  Musculoskeletal: normal range of motion, no joint swelling, deformity or tenderness  Neurologic: no cranial nerve deficit and muscle strength normal    Data Review:    Recent Labs      03/14/18   0640  03/15/18   0612 03/16/18   0624   WBC  5.8  6.6  4.9   HGB  12.3  13.4  12.4   HCT  37.7  40.2  38.2   MCV  86.6  86.0  87.1   PLT  195  255  192     Recent Labs      03/14/18   0640  03/15/18   0612 03/16/18   0624   NA  138  137  142   K  3.9  3.6*  4.1   CL  104  102  108*   CO2  21  20  22   BUN  7  7  5*   CREATININE  0.85  0.97*  0.91*     Recent Labs      03/14/18   0640  03/15/18   0612  03/16/18   0624   AST  126*  54*  27   ALT  307*  221*  131*   BILITOT  0.56  0.44  0.28*   ALKPHOS  255*  274*  200*     Recent Labs      03/15/18   0612   LIPASE  25   AMYLASE  43     No results for input(s): PROTIME, INR in the last 72 hours. No results for input(s): PTT in the last 72 hours. No results for input(s): OCCULTBLD in the last 72 hours. CEA:  No results found for: CEA  Ca 125:  No results found for:   Ca 19-9:  No results found for:   Ca 15-3:  No results found for:   AFP:  No components found for: AFAFP  Beta HCG:  No components found for: BHCG  Neuron Specific Enolase:  No results found for: NSE  Imaging Studies:                           All appropriate imaging studies and reports reviewed:  Yes                 Assessment:     Active Problems:    Post traumatic stress disorder (PTSD)    Lumbar disc disease    Chronic nonintractable headache    Liver enzyme elevation    Total bilirubin, elevated    Epigastric pain    Right upper quadrant abdominal pain    Elevated liver enzymes    Moderate malnutrition (HCC)    Chronic cholecystitis  Resolved Problems:    * No resolved hospital problems. *    Abdominal pain elevated liver enzymes abnormal imaging studies most likely cholecystitis but cholangitis has to be ruled out  Pancreatitis has to be ruled out    Recommendations:    Lap cyndi today   continue IV antibiotics   continue IV fluid   follow liver enzymes until they normalize  We'll sign off please call us if needed  Return to clinic to see us in 2- 3 weeks after discharge                      Thank you for allowing me to participate in the care of your patient. Please feel free to contact me with any questions or concerns.      Nelly Paul MD

## 2018-03-16 NOTE — PROGRESS NOTES
tablet 3    traZODone (DESYREL) 50 MG tablet 1 to 2 tablets if needed nightly prn 60 tablet 5    buPROPion (WELLBUTRIN XL) 150 MG extended release tablet Take 1 tablet by mouth every morning 30 tablet 3    prednisoLONE acetate (PRED FORTE) 1 % ophthalmic suspension Apply 1 drop to eye every hour as needed Left eye  0       Patient   Does Use ASA, NSAID No  Allergies  Allergies   Allergen Reactions   Antoniette Boards [Tofacitinib] Anaphylaxis and Other (See Comments)     gastritis    Ceclor [Cefaclor] Hives        Social   Social History   Substance Use Topics    Smoking status: Former Smoker     Packs/day: 0.25     Types: Cigarettes     Start date: 5/15/2014     Quit date: 3/5/2018    Smokeless tobacco: Never Used    Alcohol use Yes      Comment: socially        PSYCH HISTORY:  Depression No  Anxiety No  Suicide No       Family History   Problem Relation Age of Onset    High Blood Pressure Mother     Breast Cancer Maternal Aunt     Diabetes Maternal Grandmother     Stroke Maternal Grandfather     Prostate Cancer Paternal Grandfather       No family history of colon cancer, Crohn's disease, or ulcerative colitis. Review of Systems  Constitutional: negative  Eyes: negative  Ears, nose, mouth, throat, and face: negative  Respiratory: negative  Cardiovascular: negative  Gastrointestinal: negative  Genitourinary:negative  Integument/breast: negative  Hematologic/lymphatic: negative  Musculoskeletal:negative  Endocrine: negative           Physical Exam  Blood pressure 112/66, pulse 63, temperature 97.3 °F (36.3 °C), temperature source Oral, resp. rate 14, height 5' 2\" (1.575 m), weight 179 lb 0.2 oz (81.2 kg), SpO2 98 %, not currently breastfeeding.          General Appearance: alert and oriented to person, place and time, well-developed and well-nourished, in no acute distress  Skin: warm and dry, no rash or erythema  Head: normocephalic and atraumatic  Eyes: pupils equal, round, and reactive to light, extraocular eye movements intact, conjunctivae normal  ENT: hearing grossly normal bilaterally  Neck: neck supple and non tender without mass, no thyromegaly or thyroid nodules, no cervical lymphadenopathy   Pulmonary/Chest: clear to auscultation bilaterally- no wheezes, rales or rhonchi, normal air movement, no respiratory distress  Cardiovascular: normal rate, regular rhythm, normal S1 and S2, no murmurs, rubs, clicks or gallops, distal pulses intact, no carotid bruits  Abdomen: soft,Tenderness in the right upper quadrant area and epigastric area, non-distended, normal bowel sounds, no masses or organomegaly  Extremities: no cyanosis, clubbing or edema  Musculoskeletal: normal range of motion, no joint swelling, deformity or tenderness  Neurologic: no cranial nerve deficit and muscle strength normal    Data Review:    Recent Labs      03/14/18   0640  03/15/18   0612 03/16/18   0624   WBC  5.8  6.6  4.9   HGB  12.3  13.4  12.4   HCT  37.7  40.2  38.2   MCV  86.6  86.0  87.1   PLT  195  255  192     Recent Labs      03/14/18   0640  03/15/18   0612 03/16/18   0624   NA  138  137  142   K  3.9  3.6*  4.1   CL  104  102  108*   CO2  21  20  22   BUN  7  7  5*   CREATININE  0.85  0.97*  0.91*     Recent Labs      03/14/18   0640  03/15/18   0612 03/16/18   0624   AST  126*  54*  27   ALT  307*  221*  131*   BILITOT  0.56  0.44  0.28*   ALKPHOS  255*  274*  200*     Recent Labs      03/13/18   1048  03/15/18   0612   LIPASE  28  25   AMYLASE  43  43     No results for input(s): PROTIME, INR in the last 72 hours. No results for input(s): PTT in the last 72 hours. No results for input(s): OCCULTBLD in the last 72 hours.   CEA:  No results found for: CEA  Ca 125:  No results found for:   Ca 19-9:  No results found for:   Ca 15-3:  No results found for:   AFP:  No components found for: AFAFP  Beta HCG:  No components found for: BHCG  Neuron Specific Enolase:  No results found for: NSE  Imaging Studies:                           All appropriate imaging studies and reports reviewed: Yes                 Assessment:     Active Problems:    Post traumatic stress disorder (PTSD)    Lumbar disc disease    Chronic nonintractable headache    Liver enzyme elevation    Total bilirubin, elevated    Epigastric pain    Right upper quadrant abdominal pain    Elevated liver enzymes    Moderate malnutrition (HCC)    Chronic cholecystitis  Resolved Problems:    * No resolved hospital problems. *    Abdominal pain elevated liver enzymes abnormal imaging studies most likely cholecystitis but cholangitis has to be ruled out  Pancreatitis has to be ruled out    Recommendations:    continue IV antibiotics   continue IV fluid   lap choly tomorrow   follow liver enzymes until they normalize                        Thank you for allowing me to participate in the care of your patient. Please feel free to contact me with any questions or concerns.      Hilaria Corey MD

## 2018-03-16 NOTE — PROGRESS NOTES
Needs    Nutrition Risk Level: High    Nutrition Interventions:   Continue NPO  Continued Inpatient Monitoring    Nutrition Evaluation:   · Evaluation: No progress toward goals   · Goals: Adequate nutrition provision    · Monitoring: NPO Status, Diet Progression, Weight, Pertinent Labs, Nausea or Vomiting    See Adult Nutrition Doc Flowsheet for more detail.      Glenna MORAN RLETY, L.D,  Clinical Dietitian  Pager # [phone]

## 2018-03-16 NOTE — ANESTHESIA POSTPROCEDURE EVALUATION
POST- ANESTHESIA EVALUATION       Pt Name: Carmita Jacob  MRN: [id number]  YOB: 1990  Date of evaluation: 3/16/2018  Time:  5:45 PM      BP (!) 99/56   Pulse 99   Temp 98 °F (36.7 °C) (Oral)   Resp 16   Ht 5' 2\" (1.575 m)   Wt 179 lb 0.2 oz (81.2 kg)   SpO2 96%   BMI 32.74 kg/m²      Consciousness Level  Awake  Cardiopulmonary Status  Stable  Pain Adequately Treated YES  Nausea / Vomiting  NO  Adequate Hydration  YES  Anesthesia Related Complications NONE      Electronically signed by Milagro Paige MD on 3/16/2018 at 5:45 PM       Department of Anesthesiology  Postprocedure Note    Patient: Carmita Jacob  MRN: [id number]  YOB: 1990  Date of evaluation: 3/16/2018  Time:  5:45 PM     Procedure Summary     Date:  03/16/18 Room / Location:  [address] [place] [address] Charlette Frank [address] / [address] [place] [address] Charlette Frank    Anesthesia Start:  0520 Anesthesia Stop:  9281    Procedure:  LAPAROSCOPIC ROBOTIC ASSISTED MULTIPORT CHOLECYSTECTOMY (N/A Abdomen) Diagnosis:  (LIVER ENZYME ELEVATION )    Surgeon:  Flaco Chopra MD Responsible Provider:  Albert Rodriguez MD    Anesthesia Type:  general ASA Status:  2          Anesthesia Type: general    Roro Phase I: Roro Score: 10    Roro Phase II:      Last vitals: Reviewed and per EMR flowsheets.        Anesthesia Post Evaluation

## 2018-03-17 VITALS
DIASTOLIC BLOOD PRESSURE: 49 MMHG | WEIGHT: 179.01 LBS | HEART RATE: 80 BPM | TEMPERATURE: 97.6 F | BODY MASS INDEX: 32.94 KG/M2 | HEIGHT: 62 IN | RESPIRATION RATE: 18 BRPM | OXYGEN SATURATION: 97 % | SYSTOLIC BLOOD PRESSURE: 90 MMHG

## 2018-03-17 LAB
ABSOLUTE EOS #: 0 K/UL (ref 0–0.4)
ABSOLUTE IMMATURE GRANULOCYTE: ABNORMAL K/UL (ref 0–0.3)
ABSOLUTE LYMPH #: 1.6 K/UL (ref 1–4.8)
ABSOLUTE MONO #: 0.5 K/UL (ref 0.1–1.3)
ALBUMIN SERPL-MCNC: 3.3 G/DL (ref 3.5–5.2)
ALBUMIN/GLOBULIN RATIO: ABNORMAL (ref 1–2.5)
ALP BLD-CCNC: 152 U/L (ref 35–104)
ALT SERPL-CCNC: 86 U/L (ref 5–33)
ANION GAP SERPL CALCULATED.3IONS-SCNC: 11 MMOL/L (ref 9–17)
AST SERPL-CCNC: 34 U/L
BASOPHILS # BLD: 0 % (ref 0–2)
BASOPHILS ABSOLUTE: 0 K/UL (ref 0–0.2)
BILIRUB SERPL-MCNC: 0.25 MG/DL (ref 0.3–1.2)
BUN BLDV-MCNC: 5 MG/DL (ref 6–20)
BUN/CREAT BLD: ABNORMAL (ref 9–20)
CALCIUM SERPL-MCNC: 8.2 MG/DL (ref 8.6–10.4)
CHLORIDE BLD-SCNC: 108 MMOL/L (ref 98–107)
CO2: 19 MMOL/L (ref 20–31)
CREAT SERPL-MCNC: 0.84 MG/DL (ref 0.5–0.9)
DIFFERENTIAL TYPE: ABNORMAL
EOSINOPHILS RELATIVE PERCENT: 0 % (ref 0–4)
GFR AFRICAN AMERICAN: >60 ML/MIN
GFR NON-AFRICAN AMERICAN: >60 ML/MIN
GFR SERPL CREATININE-BSD FRML MDRD: ABNORMAL ML/MIN/{1.73_M2}
GFR SERPL CREATININE-BSD FRML MDRD: ABNORMAL ML/MIN/{1.73_M2}
GLUCOSE BLD-MCNC: 104 MG/DL (ref 70–99)
HCT VFR BLD CALC: 33.6 % (ref 36–46)
HEMOGLOBIN: 10.9 G/DL (ref 12–16)
IMMATURE GRANULOCYTES: ABNORMAL %
LYMPHOCYTES # BLD: 23 % (ref 24–44)
MCH RBC QN AUTO: 28.3 PG (ref 26–34)
MCHC RBC AUTO-ENTMCNC: 32.3 G/DL (ref 31–37)
MCV RBC AUTO: 87.6 FL (ref 80–100)
MONOCYTES # BLD: 8 % (ref 1–7)
NRBC AUTOMATED: ABNORMAL PER 100 WBC
PDW BLD-RTO: 14.5 % (ref 11.5–14.9)
PLATELET # BLD: 153 K/UL (ref 150–450)
PLATELET ESTIMATE: ABNORMAL
PMV BLD AUTO: 7.7 FL (ref 6–12)
POTASSIUM SERPL-SCNC: 3.7 MMOL/L (ref 3.7–5.3)
RBC # BLD: 3.84 M/UL (ref 4–5.2)
RBC # BLD: ABNORMAL 10*6/UL
SEG NEUTROPHILS: 69 % (ref 36–66)
SEGMENTED NEUTROPHILS ABSOLUTE COUNT: 4.7 K/UL (ref 1.3–9.1)
SODIUM BLD-SCNC: 138 MMOL/L (ref 135–144)
TOTAL PROTEIN: 5.8 G/DL (ref 6.4–8.3)
WBC # BLD: 6.8 K/UL (ref 3.5–11)
WBC # BLD: ABNORMAL 10*3/UL

## 2018-03-17 PROCEDURE — 80053 COMPREHEN METABOLIC PANEL: CPT

## 2018-03-17 PROCEDURE — 6360000002 HC RX W HCPCS: Performed by: FAMILY MEDICINE

## 2018-03-17 PROCEDURE — 6360000002 HC RX W HCPCS: Performed by: SURGERY

## 2018-03-17 PROCEDURE — 94762 N-INVAS EAR/PLS OXIMTRY CONT: CPT

## 2018-03-17 PROCEDURE — 6370000000 HC RX 637 (ALT 250 FOR IP): Performed by: FAMILY MEDICINE

## 2018-03-17 PROCEDURE — 2580000003 HC RX 258: Performed by: SURGERY

## 2018-03-17 PROCEDURE — 85025 COMPLETE CBC W/AUTO DIFF WBC: CPT

## 2018-03-17 PROCEDURE — 6370000000 HC RX 637 (ALT 250 FOR IP): Performed by: SURGERY

## 2018-03-17 PROCEDURE — 36415 COLL VENOUS BLD VENIPUNCTURE: CPT

## 2018-03-17 RX ADMIN — CEFAZOLIN 1 G: 1 INJECTION, POWDER, FOR SOLUTION INTRAMUSCULAR; INTRAVENOUS at 05:24

## 2018-03-17 RX ADMIN — METOCLOPRAMIDE 10 MG: 5 INJECTION, SOLUTION INTRAMUSCULAR; INTRAVENOUS at 05:20

## 2018-03-17 RX ADMIN — OXYCODONE HYDROCHLORIDE AND ACETAMINOPHEN 1 TABLET: 5; 325 TABLET ORAL at 05:20

## 2018-03-17 RX ADMIN — TOPIRAMATE 50 MG: 25 TABLET, FILM COATED ORAL at 09:21

## 2018-03-17 RX ADMIN — SERTRALINE HYDROCHLORIDE 100 MG: 100 TABLET ORAL at 09:22

## 2018-03-17 RX ADMIN — OXYCODONE HYDROCHLORIDE AND ACETAMINOPHEN 2 TABLET: 5; 325 TABLET ORAL at 09:21

## 2018-03-17 RX ADMIN — CEFAZOLIN 1 G: 1 INJECTION, POWDER, FOR SOLUTION INTRAMUSCULAR; INTRAVENOUS at 12:59

## 2018-03-17 RX ADMIN — METOCLOPRAMIDE 10 MG: 5 INJECTION, SOLUTION INTRAMUSCULAR; INTRAVENOUS at 12:59

## 2018-03-17 RX ADMIN — BUPROPION HYDROCHLORIDE 150 MG: 150 TABLET, FILM COATED, EXTENDED RELEASE ORAL at 09:21

## 2018-03-17 RX ADMIN — ENOXAPARIN SODIUM 40 MG: 40 INJECTION SUBCUTANEOUS at 09:22

## 2018-03-17 RX ADMIN — OXYCODONE HYDROCHLORIDE AND ACETAMINOPHEN 2 TABLET: 5; 325 TABLET ORAL at 13:50

## 2018-03-17 RX ADMIN — SODIUM CHLORIDE: 9 INJECTION, SOLUTION INTRAVENOUS at 01:00

## 2018-03-17 ASSESSMENT — PAIN SCALES - GENERAL
PAINLEVEL_OUTOF10: 5
PAINLEVEL_OUTOF10: 7
PAINLEVEL_OUTOF10: 0
PAINLEVEL_OUTOF10: 7
PAINLEVEL_OUTOF10: 6

## 2018-03-17 NOTE — CARE COORDINATION
Reviewed new antibiotic prescription for patient at discharge. Information added to discharge instructions    - reviewed possible and common side effects. Especially monitoring for diarrhea due to cipro   antibiotic use. -reviewed directions for when to take antibiotic, and dietary restrictions. - emphasized importance of completing antibiotic therapy. - reviewed when to call physician.

## 2018-03-17 NOTE — PROGRESS NOTES
Spoke to Dr Chiqui Angel regarding PO ATB and patient not wanting to take Cipro. Telephone orders placed with read back to dc Cipro and Flagyl and add Ancef 1g Q8 x 3 doses.

## 2018-03-17 NOTE — DISCHARGE INSTR - ACTIVITY
No driving while taking pain medication. No lifting, pushing, pulling or tugging. May shower, pat incisions dry. Do not submerge incisions. Monitor incision for redness and warmth. Monitor for fever.

## 2018-03-17 NOTE — DISCHARGE INSTR - DIET

## 2018-03-20 LAB — SURGICAL PATHOLOGY REPORT: NORMAL

## 2018-03-21 NOTE — DISCHARGE SUMMARY
Family Medicine Discharge Summary    Diaz Pace  :  1990  MRN:  [id number]    Admit date:  3/12/2018  Discharge date:  3/17/2018    Admitting Physician:  Gerhardt Emms, MD    Discharge Diagnoses:    Patient Active Problem List   Diagnosis    Uveitis    Juvenile rheumatoid arthritis ([address]    Cluster headache    HSP (Henoch Schonlein purpura) (HCC)    Post traumatic stress disorder (PTSD)    History of sepsis    Hyperglycemia    Chronic bilateral low back pain with sciatica    Generalized abdominal pain    Lumbar disc disease    Chronic nonintractable headache    Liver enzyme elevation    Total bilirubin, elevated    Epigastric pain    Right upper quadrant abdominal pain    Elevated liver enzymes    Moderate malnutrition (HCC)    Chronic cholecystitis       Admission Condition:  fair  Discharged Condition:  fair    Hospital Course:   32 yo admitted with epigastric and RUQ pain. GI consult was obtained for elevated liver enzymes; gallbladder ultrasound found acute cholecystitis; HIDA demionstrated delayed gallbladder visualization. ERCP findings suggested chronic inflammation. Cholecystectomy was done on 3/16/2018. Discharge Medications:       Anne Patel   Home Medication Instructions NOT:[id number]    Printed on:18 [id number]   Medication Information                      buPROPion (WELLBUTRIN XL) 150 MG extended release tablet  Take 1 tablet by mouth every morning             ciprofloxacin (CIPRO) 500 MG tablet  Take 1 tablet by mouth 2 times daily for 7 days             ibuprofen (ADVIL;MOTRIN) 800 MG tablet  Take 1 tablet by mouth every 8 hours as needed for Pain             ondansetron (ZOFRAN) 4 MG tablet  Take every six hours as needed             oxyCODONE-acetaminophen (PERCOCET) 5-325 MG per tablet  Take 1 tablet by mouth every 6 hours as needed for Pain for up to 7 days . Take lowest dose possible to manage pain.              prednisoLONE acetate (PRED FORTE) 1 % ophthalmic suspension  Apply 1 drop to eye every hour as needed Left eye             sertraline (ZOLOFT) 100 MG tablet  Take 1 tablet by mouth daily             topiramate (TOPAMAX) 50 MG tablet  Take 1 tablet by mouth 2 times daily             traZODone (DESYREL) 50 MG tablet  1 to 2 tablets if needed nightly prn                 Consults:  Gastroenterology, surgery    Significant Diagnostic Studies:  See record    Treatments:   See record    Disposition:   home  Follow up with Amber Rawls MD as needed    Signed:   Edwina Matute MD  3/21/2018, 1:37 PM

## 2018-03-23 PROBLEM — R17 TOTAL BILIRUBIN, ELEVATED: Status: RESOLVED | Noted: 2018-03-13 | Resolved: 2018-03-23

## 2018-03-23 PROBLEM — R10.13 EPIGASTRIC PAIN: Status: RESOLVED | Noted: 2018-03-13 | Resolved: 2018-03-23

## 2018-03-23 PROBLEM — R10.11 RIGHT UPPER QUADRANT ABDOMINAL PAIN: Status: RESOLVED | Noted: 2018-03-13 | Resolved: 2018-03-23

## 2018-03-23 PROBLEM — R74.8 LIVER ENZYME ELEVATION: Status: RESOLVED | Noted: 2018-03-12 | Resolved: 2018-03-23

## 2018-03-23 PROBLEM — K81.1 CHRONIC CHOLECYSTITIS: Status: RESOLVED | Noted: 2018-03-14 | Resolved: 2018-03-23

## 2018-03-23 PROBLEM — F32.A DEPRESSION: Status: ACTIVE | Noted: 2018-03-23

## 2018-03-23 PROBLEM — E44.0 MODERATE MALNUTRITION (HCC): Status: RESOLVED | Noted: 2018-03-13 | Resolved: 2018-03-23

## 2018-04-02 ENCOUNTER — HOSPITAL ENCOUNTER (OUTPATIENT)
Age: 28
Setting detail: SPECIMEN
Discharge: HOME | End: 2018-04-02
Payer: COMMERCIAL

## 2018-04-02 DIAGNOSIS — R74.8 ELEVATED LIVER ENZYMES: ICD-10-CM

## 2018-04-02 DIAGNOSIS — D64.9 ANEMIA, UNSPECIFIED TYPE: ICD-10-CM

## 2018-04-02 DIAGNOSIS — R17 TOTAL BILIRUBIN, ELEVATED: ICD-10-CM

## 2018-04-02 DIAGNOSIS — Z90.49 STATUS POST LAPAROSCOPIC CHOLECYSTECTOMY: ICD-10-CM

## 2018-04-02 LAB
ALBUMIN SERPL-MCNC: 4.6 G/DL (ref 3.5–5.2)
ALBUMIN/GLOBULIN RATIO: 1.4 (ref 1–2.5)
ALP BLD-CCNC: 122 U/L (ref 35–104)
ALT SERPL-CCNC: 12 U/L (ref 5–33)
ANION GAP SERPL CALCULATED.3IONS-SCNC: 15 MMOL/L (ref 9–17)
AST SERPL-CCNC: 15 U/L
BILIRUB SERPL-MCNC: 0.33 MG/DL (ref 0.3–1.2)
BUN BLDV-MCNC: 14 MG/DL (ref 6–20)
BUN/CREAT BLD: ABNORMAL (ref 9–20)
CALCIUM SERPL-MCNC: 9.3 MG/DL (ref 8.6–10.4)
CHLORIDE BLD-SCNC: 103 MMOL/L (ref 98–107)
CO2: 21 MMOL/L (ref 20–31)
CREAT SERPL-MCNC: 0.85 MG/DL (ref 0.5–0.9)
GFR AFRICAN AMERICAN: >60 ML/MIN
GFR NON-AFRICAN AMERICAN: >60 ML/MIN
GFR SERPL CREATININE-BSD FRML MDRD: ABNORMAL ML/MIN/{1.73_M2}
GFR SERPL CREATININE-BSD FRML MDRD: ABNORMAL ML/MIN/{1.73_M2}
GLUCOSE BLD-MCNC: 81 MG/DL (ref 70–99)
HCT VFR BLD CALC: 42.7 % (ref 36.3–47.1)
HEMOGLOBIN: 13.5 G/DL (ref 11.9–15.1)
MCH RBC QN AUTO: 27.7 PG (ref 25.2–33.5)
MCHC RBC AUTO-ENTMCNC: 31.6 G/DL (ref 28.4–34.8)
MCV RBC AUTO: 87.5 FL (ref 82.6–102.9)
NRBC AUTOMATED: 0 PER 100 WBC
PDW BLD-RTO: 13.1 % (ref 11.8–14.4)
PLATELET # BLD: 303 K/UL (ref 138–453)
PMV BLD AUTO: 10.2 FL (ref 8.1–13.5)
POTASSIUM SERPL-SCNC: 3.8 MMOL/L (ref 3.7–5.3)
RBC # BLD: 4.88 M/UL (ref 3.95–5.11)
SODIUM BLD-SCNC: 139 MMOL/L (ref 135–144)
TOTAL PROTEIN: 8 G/DL (ref 6.4–8.3)
WBC # BLD: 8.8 K/UL (ref 3.5–11.3)

## 2018-05-04 PROBLEM — R10.84 GENERALIZED ABDOMINAL PAIN: Status: RESOLVED | Noted: 2017-07-25 | Resolved: 2018-05-04

## 2018-05-04 PROBLEM — R74.8 ELEVATED LIVER ENZYMES: Status: RESOLVED | Noted: 2018-03-13 | Resolved: 2018-05-04

## 2018-07-02 ENCOUNTER — HOSPITAL ENCOUNTER (OUTPATIENT)
Age: 28
Discharge: HOME | End: 2018-07-02
Payer: COMMERCIAL

## 2018-07-02 ENCOUNTER — OFFICE VISIT (OUTPATIENT)
Dept: FAMILY MEDICINE CLINIC | Age: 28
End: 2018-07-02
Payer: COMMERCIAL

## 2018-07-02 VITALS
OXYGEN SATURATION: 98 % | SYSTOLIC BLOOD PRESSURE: 92 MMHG | TEMPERATURE: 99.2 F | RESPIRATION RATE: 16 BRPM | DIASTOLIC BLOOD PRESSURE: 68 MMHG | WEIGHT: 173 LBS | HEART RATE: 104 BPM | BODY MASS INDEX: 31.63 KG/M2

## 2018-07-02 DIAGNOSIS — R73.9 HYPERGLYCEMIA: ICD-10-CM

## 2018-07-02 DIAGNOSIS — M08.00 JUVENILE RHEUMATOID ARTHRITIS (HCC): ICD-10-CM

## 2018-07-02 DIAGNOSIS — Z00.00 ANNUAL PHYSICAL EXAM: ICD-10-CM

## 2018-07-02 DIAGNOSIS — J20.9 ACUTE BRONCHITIS, UNSPECIFIED ORGANISM: ICD-10-CM

## 2018-07-02 DIAGNOSIS — M51.9 LUMBAR DISC DISEASE: ICD-10-CM

## 2018-07-02 DIAGNOSIS — Z00.00 ANNUAL PHYSICAL EXAM: Primary | ICD-10-CM

## 2018-07-02 DIAGNOSIS — F32.A DEPRESSION, UNSPECIFIED DEPRESSION TYPE: ICD-10-CM

## 2018-07-02 DIAGNOSIS — G89.29 CHRONIC NONINTRACTABLE HEADACHE, UNSPECIFIED HEADACHE TYPE: ICD-10-CM

## 2018-07-02 DIAGNOSIS — R51.9 CHRONIC NONINTRACTABLE HEADACHE, UNSPECIFIED HEADACHE TYPE: ICD-10-CM

## 2018-07-02 DIAGNOSIS — F41.9 ANXIETY: ICD-10-CM

## 2018-07-02 LAB
ALBUMIN SERPL-MCNC: 4.4 G/DL (ref 3.5–5.2)
ALBUMIN/GLOBULIN RATIO: 1.6 (ref 1–2.5)
ALP BLD-CCNC: 134 U/L (ref 35–104)
ALT SERPL-CCNC: 15 U/L (ref 5–33)
ANION GAP SERPL CALCULATED.3IONS-SCNC: 12 MMOL/L (ref 9–17)
AST SERPL-CCNC: 15 U/L
BILIRUB SERPL-MCNC: 0.36 MG/DL (ref 0.3–1.2)
BUN BLDV-MCNC: 13 MG/DL (ref 6–20)
BUN/CREAT BLD: ABNORMAL (ref 9–20)
CALCIUM SERPL-MCNC: 8.8 MG/DL (ref 8.6–10.4)
CHLORIDE BLD-SCNC: 107 MMOL/L (ref 98–107)
CO2: 22 MMOL/L (ref 20–31)
CREAT SERPL-MCNC: 0.92 MG/DL (ref 0.5–0.9)
ESTIMATED AVERAGE GLUCOSE: 91 MG/DL
GFR AFRICAN AMERICAN: >60 ML/MIN
GFR NON-AFRICAN AMERICAN: >60 ML/MIN
GFR SERPL CREATININE-BSD FRML MDRD: ABNORMAL ML/MIN/{1.73_M2}
GFR SERPL CREATININE-BSD FRML MDRD: ABNORMAL ML/MIN/{1.73_M2}
GLUCOSE BLD-MCNC: 95 MG/DL (ref 70–99)
HBA1C MFR BLD: 4.8 % (ref 4–6)
HCT VFR BLD CALC: 41 % (ref 36.3–47.1)
HEMOGLOBIN: 13.3 G/DL (ref 11.9–15.1)
MCH RBC QN AUTO: 27.9 PG (ref 25.2–33.5)
MCHC RBC AUTO-ENTMCNC: 32.4 G/DL (ref 28.4–34.8)
MCV RBC AUTO: 86 FL (ref 82.6–102.9)
NRBC AUTOMATED: 0 PER 100 WBC
PDW BLD-RTO: 12.8 % (ref 11.8–14.4)
PLATELET # BLD: 232 K/UL (ref 138–453)
PMV BLD AUTO: 9.9 FL (ref 8.1–13.5)
POTASSIUM SERPL-SCNC: 3.9 MMOL/L (ref 3.7–5.3)
RBC # BLD: 4.77 M/UL (ref 3.95–5.11)
SODIUM BLD-SCNC: 141 MMOL/L (ref 135–144)
TOTAL PROTEIN: 7.2 G/DL (ref 6.4–8.3)
WBC # BLD: 6.8 K/UL (ref 3.5–11.3)

## 2018-07-02 PROCEDURE — 80053 COMPREHEN METABOLIC PANEL: CPT

## 2018-07-02 PROCEDURE — 1036F TOBACCO NON-USER: CPT | Performed by: FAMILY MEDICINE

## 2018-07-02 PROCEDURE — 99395 PREV VISIT EST AGE 18-39: CPT | Performed by: FAMILY MEDICINE

## 2018-07-02 PROCEDURE — 86735 MUMPS ANTIBODY: CPT

## 2018-07-02 PROCEDURE — G8427 DOCREV CUR MEDS BY ELIG CLIN: HCPCS | Performed by: FAMILY MEDICINE

## 2018-07-02 PROCEDURE — 83036 HEMOGLOBIN GLYCOSYLATED A1C: CPT

## 2018-07-02 PROCEDURE — G8417 CALC BMI ABV UP PARAM F/U: HCPCS | Performed by: FAMILY MEDICINE

## 2018-07-02 PROCEDURE — 85027 COMPLETE CBC AUTOMATED: CPT

## 2018-07-02 PROCEDURE — 36415 COLL VENOUS BLD VENIPUNCTURE: CPT

## 2018-07-02 PROCEDURE — 99214 OFFICE O/P EST MOD 30 MIN: CPT | Performed by: FAMILY MEDICINE

## 2018-07-02 RX ORDER — AZITHROMYCIN 250 MG/1
TABLET, FILM COATED ORAL
Qty: 1 PACKET | Refills: 0 | Status: SHIPPED | OUTPATIENT
Start: 2018-07-02 | End: 2018-07-12

## 2018-07-02 RX ORDER — AMITRIPTYLINE HYDROCHLORIDE 25 MG/1
25 TABLET, FILM COATED ORAL NIGHTLY
Qty: 30 TABLET | Refills: 3 | Status: SHIPPED | OUTPATIENT
Start: 2018-07-02 | End: 2018-07-02 | Stop reason: CLARIF

## 2018-07-02 RX ORDER — PROPRANOLOL HYDROCHLORIDE 80 MG/1
80 CAPSULE, EXTENDED RELEASE ORAL DAILY
Qty: 30 CAPSULE | Refills: 11 | Status: SHIPPED | OUTPATIENT
Start: 2018-07-02 | End: 2020-08-12 | Stop reason: ALTCHOICE

## 2018-07-02 ASSESSMENT — ENCOUNTER SYMPTOMS
RHINORRHEA: 0
VOMITING: 0
ABDOMINAL DISTENTION: 0
BACK PAIN: 0
SHORTNESS OF BREATH: 0
ABDOMINAL PAIN: 0
NAUSEA: 0
DIARRHEA: 0
CHEST TIGHTNESS: 0
SORE THROAT: 0
CONSTIPATION: 0
COUGH: 1

## 2018-07-02 NOTE — PROGRESS NOTES
Future     Standing Expiration Date:   7/2/2019    Hemoglobin A1C     Standing Status:   Future     Standing Expiration Date:   7/2/2019    Rubella antibody, IgG    Rubeola Antibody, IgG    Mumps Antibody, IgG     Standing Status:   Future     Standing Expiration Date:   7/2/2019      Orders Placed This Encounter   Medications    azithromycin (ZITHROMAX) 250 MG tablet     Sig: Take 2 tabs on day 1, then 1 tab on days 2-5     Dispense:  1 packet     Refill:  0    propranolol (INDERAL LA) 80 MG extended release capsule     Sig: Take 1 capsule by mouth daily     Dispense:  30 capsule     Refill:  11    DISCONTD: amitriptyline (ELAVIL) 25 MG tablet     Sig: Take 1 tablet by mouth nightly     Dispense:  30 tablet     Refill:  3     Work physical form filled out with no limitations    Will stop the Topamax secondary to side effects and will start the Inderal LA 80mg daily and cont with the Trazodone at night and will follow up in a month to see how she is doing    Will cont with the Wellbutrin and Zoloft as she is doing better    Get labs now and will call with results    Rest of systems unchanged, continue current treatments. Medications, labs, diagnostic studies, consultations and follow-up as documented in this encounter.  Rest of systems unchanged, continue current treatments

## 2018-07-03 LAB — MUV IGG SER QL: 7.43

## 2018-07-31 ENCOUNTER — OFFICE VISIT (OUTPATIENT)
Dept: FAMILY MEDICINE CLINIC | Age: 28
End: 2018-07-31
Payer: COMMERCIAL

## 2018-07-31 VITALS
SYSTOLIC BLOOD PRESSURE: 114 MMHG | TEMPERATURE: 98.4 F | OXYGEN SATURATION: 98 % | DIASTOLIC BLOOD PRESSURE: 66 MMHG | RESPIRATION RATE: 16 BRPM | HEART RATE: 106 BPM

## 2018-07-31 DIAGNOSIS — J20.9 ACUTE BRONCHITIS, UNSPECIFIED ORGANISM: ICD-10-CM

## 2018-07-31 DIAGNOSIS — F43.10 POST TRAUMATIC STRESS DISORDER (PTSD): ICD-10-CM

## 2018-07-31 DIAGNOSIS — R19.7 DIARRHEA, UNSPECIFIED TYPE: ICD-10-CM

## 2018-07-31 DIAGNOSIS — M51.9 LUMBAR DISC DISEASE: ICD-10-CM

## 2018-07-31 DIAGNOSIS — R51.9 CHRONIC NONINTRACTABLE HEADACHE, UNSPECIFIED HEADACHE TYPE: Primary | ICD-10-CM

## 2018-07-31 DIAGNOSIS — R73.9 HYPERGLYCEMIA: ICD-10-CM

## 2018-07-31 DIAGNOSIS — G89.29 CHRONIC NONINTRACTABLE HEADACHE, UNSPECIFIED HEADACHE TYPE: Primary | ICD-10-CM

## 2018-07-31 DIAGNOSIS — F32.A DEPRESSION, UNSPECIFIED DEPRESSION TYPE: ICD-10-CM

## 2018-07-31 DIAGNOSIS — F41.9 ANXIETY: ICD-10-CM

## 2018-07-31 PROCEDURE — 99214 OFFICE O/P EST MOD 30 MIN: CPT | Performed by: FAMILY MEDICINE

## 2018-07-31 PROCEDURE — 1036F TOBACCO NON-USER: CPT | Performed by: FAMILY MEDICINE

## 2018-07-31 PROCEDURE — G8417 CALC BMI ABV UP PARAM F/U: HCPCS | Performed by: FAMILY MEDICINE

## 2018-07-31 PROCEDURE — G8427 DOCREV CUR MEDS BY ELIG CLIN: HCPCS | Performed by: FAMILY MEDICINE

## 2018-07-31 RX ORDER — DOXYCYCLINE HYCLATE 100 MG/1
100 CAPSULE ORAL 2 TIMES DAILY
Qty: 20 CAPSULE | Refills: 0 | Status: SHIPPED | OUTPATIENT
Start: 2018-07-31 | End: 2018-08-10

## 2018-07-31 RX ORDER — ALBUTEROL SULFATE 90 UG/1
2 AEROSOL, METERED RESPIRATORY (INHALATION) EVERY 6 HOURS PRN
Qty: 1 INHALER | Refills: 3 | Status: SHIPPED | OUTPATIENT
Start: 2018-07-31 | End: 2020-08-26 | Stop reason: SDUPTHER

## 2018-07-31 ASSESSMENT — ENCOUNTER SYMPTOMS
BACK PAIN: 0
COUGH: 1
NAUSEA: 0
VOMITING: 0
SHORTNESS OF BREATH: 1
WHEEZING: 1
ABDOMINAL DISTENTION: 0
DIARRHEA: 1
CHEST TIGHTNESS: 0
CONSTIPATION: 0
ABDOMINAL PAIN: 0
SORE THROAT: 0
RHINORRHEA: 0

## 2018-07-31 NOTE — PROGRESS NOTES
nervous/anxious (stable). Objective:   Physical Exam   Constitutional: She is oriented to person, place, and time. She appears well-developed. No distress. HENT:   Head: Normocephalic and atraumatic. Right Ear: External ear normal.   Left Ear: External ear normal.   Nose: Nose normal.   Mouth/Throat: Oropharynx is clear and moist. No oropharyngeal exudate. Eyes: Conjunctivae and EOM are normal. Pupils are equal, round, and reactive to light. Neck: Normal range of motion. Cardiovascular: Normal rate, regular rhythm, normal heart sounds and intact distal pulses. No murmur heard. Pulmonary/Chest: Effort normal. No respiratory distress. She has wheezes in the right upper field, the right middle field, the right lower field, the left upper field, the left middle field and the left lower field. She has rhonchi in the right upper field, the right middle field, the right lower field, the left upper field, the left middle field and the left lower field. She exhibits no tenderness. Abdominal: Soft. Bowel sounds are normal. She exhibits no distension and no mass. There is no tenderness. Musculoskeletal: Normal range of motion. She exhibits no edema or tenderness. Lymphadenopathy:     She has no cervical adenopathy. Neurological: She is alert and oriented to person, place, and time. She has normal reflexes. Skin: No rash noted. Psychiatric: She has a normal mood and affect. Her behavior is normal.   Vitals reviewed. Assessment:       Diagnosis Orders   1. Chronic nonintractable headache, unspecified headache type     2. Depression, unspecified depression type     3. Post traumatic stress disorder (PTSD)     4. Anxiety     5. Hyperglycemia     6. Lumbar disc disease     7. Acute bronchitis, unspecified organism  XR CHEST STANDARD (2 VW)    doxycycline hyclate (VIBRAMYCIN) 100 MG capsule    albuterol sulfate HFA (PROAIR HFA) 108 (90 Base) MCG/ACT inhaler   8.  Diarrhea, unspecified type  Stool Culture    C Diff Toxin by RT PCR    O&P PANEL ([employer] ASS[employer]) #1    Fecal leukocytes    Occult Blood Screen           Plan:      Orders Placed This Encounter   Procedures    Stool Culture    C Diff Toxin by RT PCR    O&P PANEL (TRAVEL ASSOCIATED) #1    Fecal leukocytes    XR CHEST STANDARD (2 VW)     Standing Status:   Future     Standing Expiration Date:   8/31/2018     Order Specific Question:   Reason for exam:     Answer:   Cough    Occult Blood Screen      Orders Placed This Encounter   Medications    doxycycline hyclate (VIBRAMYCIN) 100 MG capsule     Sig: Take 1 capsule by mouth 2 times daily for 10 days     Dispense:  20 capsule     Refill:  0    albuterol sulfate HFA (PROAIR HFA) 108 (90 Base) MCG/ACT inhaler     Sig: Inhale 2 puffs into the lungs every 6 hours as needed for Wheezing     Dispense:  1 Inhaler     Refill:  3     Will cont with the Zoloft and Wellbutrin as she is doing better - her divorce is still not final and her ex is not being very cooperative    Will cont with the Inderal prn for her HA's    Will start the Doxycycline and refill the Albuterol Inhaler and cont with the Spiriva    Will get a chest x-ray and follow up on results    I did give her orders to check stool studies if her diarrhea continues with her being on the ATB's and starting another ATB - will cont with a probiotic daily    Rest of systems unchanged, continue current treatments. Medications, labs, diagnostic studies, consultations and follow-up as documented in this encounter.  Rest of systems unchanged, continue current treatments

## 2018-07-31 NOTE — LETTER
[facility]  [address] [facility]  Phone: [phone]  Fax: [phone]    Dottie Johnson MD        July 31, 2018     Patient: Jemal Trivedi   YOB: 1990   Date of Visit: 7/31/2018       To Whom it May Concern:    Murel Frankel was seen in my clinic on 7/31/2018. She may return to work on 08/01/2018. If you have any questions or concerns, please don't hesitate to call.     Sincerely,         Dottie Johnson MD

## 2018-07-31 NOTE — LETTER
[facility]  [address] [facility] [address]  Phone: [phone]  Fax: [phone]    Clara Williamson MD        July 31, 2018     Patient: Alexus Maria   YOB: 1990   Date of Visit: 7/31/2018       To Whom it May Concern:    Tiffany Pablo was seen in my clinic on 7/31/2018. She may return to work on 08/02/2018. If you have any questions or concerns, please don't hesitate to call.     Sincerely,         Clara Williamson MD

## 2018-08-01 ENCOUNTER — HOSPITAL ENCOUNTER (OUTPATIENT)
Age: 28
Discharge: HOME | End: 2018-08-01
Payer: COMMERCIAL

## 2018-08-01 ENCOUNTER — HOSPITAL ENCOUNTER (OUTPATIENT)
Dept: GENERAL RADIOLOGY | Age: 28
Discharge: HOME | End: 2018-08-03
Payer: COMMERCIAL

## 2018-08-01 DIAGNOSIS — J20.9 ACUTE BRONCHITIS, UNSPECIFIED ORGANISM: ICD-10-CM

## 2018-08-01 PROCEDURE — 71046 X-RAY EXAM CHEST 2 VIEWS: CPT

## 2019-06-17 ENCOUNTER — TELEPHONE (OUTPATIENT)
Dept: FAMILY MEDICINE CLINIC | Age: 29
End: 2019-06-17

## 2019-06-17 NOTE — TELEPHONE ENCOUNTER
Patient called asking if her physical form for her employer from last July can be faxed to them. I did print it out and faxed it to [phone].

## 2019-10-04 ENCOUNTER — HOSPITAL ENCOUNTER (OUTPATIENT)
Age: 29
Discharge: HOME | End: 2019-10-04

## 2019-10-04 LAB — RUBV IGG SER QL: 99.3 IU/ML

## 2019-10-04 PROCEDURE — 86762 RUBELLA ANTIBODY: CPT

## 2019-10-04 PROCEDURE — 86787 VARICELLA-ZOSTER ANTIBODY: CPT

## 2019-10-04 PROCEDURE — 86765 RUBEOLA ANTIBODY: CPT

## 2019-10-04 PROCEDURE — 86735 MUMPS ANTIBODY: CPT

## 2019-10-04 PROCEDURE — 86481 TB AG RESPONSE T-CELL SUSP: CPT

## 2019-10-07 LAB
MEASLES IMMUNE (IGG): 3.93
MUV IGG SER QL: 6.6
T-SPOT TB TEST: NORMAL
VZV IGG SER QL IA: 1.7

## 2020-07-14 ENCOUNTER — TELEPHONE (OUTPATIENT)
Dept: FAMILY MEDICINE CLINIC | Age: 30
End: 2020-07-14

## 2020-07-14 NOTE — TELEPHONE ENCOUNTER
Patients  tested positive for COVID and patient now has a low grade fever and has been light headed for the past three days. Patient had COVID test done on Friday, still waiting on results. Patient would like a call back. Please advise.

## 2020-07-14 NOTE — TELEPHONE ENCOUNTER
I have not seen this pt in almost 2 years. I did not order the Covid test and will have to wait for the results. Tylenol, rest, and fluids.

## 2020-07-14 NOTE — TELEPHONE ENCOUNTER
Discussed instructions with patient. I did explain that we have not seen her and did not order the testing. She said she wasn't sure if the symptoms were something she should come in and be seen for and I told her we are not seeing anyone with Covid symptoms in the office, they are all being sent to the flu clinic.

## 2020-08-11 ENCOUNTER — OFFICE VISIT (OUTPATIENT)
Dept: FAMILY MEDICINE CLINIC | Age: 30
End: 2020-08-11
Payer: COMMERCIAL

## 2020-08-11 VITALS
OXYGEN SATURATION: 97 % | HEART RATE: 95 BPM | SYSTOLIC BLOOD PRESSURE: 128 MMHG | BODY MASS INDEX: 36.8 KG/M2 | TEMPERATURE: 98.4 F | DIASTOLIC BLOOD PRESSURE: 88 MMHG | HEIGHT: 62 IN | WEIGHT: 200 LBS

## 2020-08-11 PROCEDURE — 1036F TOBACCO NON-USER: CPT | Performed by: NURSE PRACTITIONER

## 2020-08-11 PROCEDURE — 99214 OFFICE O/P EST MOD 30 MIN: CPT | Performed by: NURSE PRACTITIONER

## 2020-08-11 PROCEDURE — G8427 DOCREV CUR MEDS BY ELIG CLIN: HCPCS | Performed by: NURSE PRACTITIONER

## 2020-08-11 PROCEDURE — G8417 CALC BMI ABV UP PARAM F/U: HCPCS | Performed by: NURSE PRACTITIONER

## 2020-08-11 RX ORDER — TIZANIDINE 4 MG/1
4 TABLET ORAL 3 TIMES DAILY PRN
Qty: 30 TABLET | Refills: 0 | Status: SHIPPED | OUTPATIENT
Start: 2020-08-11 | End: 2020-08-21

## 2020-08-11 RX ORDER — PREDNISONE 20 MG/1
TABLET ORAL
Qty: 17 TABLET | Refills: 0 | Status: SHIPPED | OUTPATIENT
Start: 2020-08-11 | End: 2020-08-21

## 2020-08-11 RX ORDER — GABAPENTIN 300 MG/1
300 CAPSULE ORAL NIGHTLY
Qty: 30 CAPSULE | Refills: 0 | Status: SHIPPED | OUTPATIENT
Start: 2020-08-11 | End: 2020-12-04 | Stop reason: ALTCHOICE

## 2020-08-11 NOTE — PROGRESS NOTES
Roxanne Becker, APRN-CNP  [facility]  [id number]  [address] [address]  Dept: [phone]  Dept Fax: [phone]     Patient ID: Anabella Bishop is a 31 y.o. female. HPI    Pt here today for an acute visit secondary to low back pain. She describes it as constant pain with radiation down left leg and occasional numbness/tingeling. There are no aggravating factors. She has been taking ibuprofen without relief. Pt denies fever/chills or bowel/bladder complaints. She did have an MRI in 2017 which showed disc protrusion at L4/L5 with mild foraminal narrowing. She is having difficulty sitting, standing and lying down. She did complete physical therapy back in 2017 and related that it made her pain manageable, but never fully took it away. Pt denies any fever or chills. Pt today denies any HA, chest pain, or SOB. Pt denies any N/V/D/C or abdominal pain. The patient's past medical, surgical, social, and family history as well as his current medications and allergies were reviewed as documented in today's encounter by KIRILL Rico. Current Outpatient Medications on File Prior to Visit   Medication Sig Dispense Refill    albuterol sulfate HFA (PROAIR HFA) 108 (90 Base) MCG/ACT inhaler Inhale 2 puffs into the lungs every 6 hours as needed for Wheezing 1 Inhaler 3     No current facility-administered medications on file prior to visit. Subjective:     Review of Systems   Constitutional: Negative for activity change, fatigue and fever. HENT: Negative for congestion, ear pain, rhinorrhea and sore throat. Respiratory: Negative for cough, chest tightness and shortness of breath. Cardiovascular: Negative for chest pain and palpitations. Gastrointestinal: Negative for abdominal distention, abdominal pain, constipation, diarrhea, nausea and vomiting. Endocrine: Negative for polydipsia, polyphagia and polyuria. Genitourinary: Negative for difficulty urinating and dysuria. Musculoskeletal: Positive for back pain (with radiation down right leg) and gait problem. Negative for arthralgias and myalgias. Skin: Negative for rash. Neurological: Positive for numbness (occasional right leg). Negative for dizziness, weakness, light-headedness and headaches. Hematological: Negative for adenopathy. Psychiatric/Behavioral: Positive for sleep disturbance (due to the pain). Negative for agitation and behavioral problems. The patient is not nervous/anxious. Objective:     Physical Exam  Vitals signs reviewed. Constitutional:       General: She is not in acute distress. Appearance: Normal appearance. HENT:      Head: Normocephalic and atraumatic. Right Ear: External ear normal.      Left Ear: External ear normal.      Nose: Nose normal.      Mouth/Throat:      Mouth: Mucous membranes are moist.      Pharynx: No oropharyngeal exudate or posterior oropharyngeal erythema. Eyes:      Extraocular Movements: Extraocular movements intact. Conjunctiva/sclera: Conjunctivae normal.      Pupils: Pupils are equal, round, and reactive to light. Neck:      Musculoskeletal: Normal range of motion. Cardiovascular:      Rate and Rhythm: Normal rate and regular rhythm. Pulses: Normal pulses. Heart sounds: No murmur. Pulmonary:      Effort: Pulmonary effort is normal. No respiratory distress. Breath sounds: Normal breath sounds. No wheezing, rhonchi or rales. Abdominal:      General: Bowel sounds are normal. There is no distension. Palpations: Abdomen is soft. Musculoskeletal: Normal range of motion. Comments: Inspection: No visible abnormalities noted. Palpation: Diffuse lumbar spine point tenderness, bilateral lumbar paraspinal muscle tenderness. ROM: Flexion, extension, bending and twisting slightly decreased. Reproducible pain with ROM.   Straight leg test: Positive  Strength: 5/5 bilateral lower extremities. DTR (patellar): 2/4 and symmetric. Skin:     General: Skin is warm and dry. Findings: No rash. Neurological:      General: No focal deficit present. Mental Status: She is alert and oriented to person, place, and time. Deep Tendon Reflexes: Reflexes normal.   Psychiatric:         Behavior: Behavior normal.       Assessment:      Diagnosis Orders   1. Acute left-sided low back pain with bilateral sciatica  gabapentin (NEURONTIN) 300 MG capsule    predniSONE (DELTASONE) 20 MG tablet    tiZANidine (ZANAFLEX) 4 MG tablet    XR LUMBAR SPINE (2-3 VIEWS)    External Referral To Physical Therapy     Plan:     1. Acute left-sided low back pain with bilateral sciatica  - Given above noted symptoms and history of L4-L5 disc protrusion and foraminal narrowing, will repeat x-ray and refer to physical therapy  - Prednisone taper, muscle relaxer and gabapentin at hs  - gabapentin (NEURONTIN) 300 MG capsule; Take 1 capsule by mouth nightly for 30 days. Dispense: 30 capsule; Refill: 0  - predniSONE (DELTASONE) 20 MG tablet; Take 2 tabs daily x 4 days, then 1 tab daily x 6 days, then 1/2 tab daily x 6 days  Dispense: 17 tablet; Refill: 0  - tiZANidine (ZANAFLEX) 4 MG tablet; Take 1 tablet by mouth 3 times daily as needed (pain)  Dispense: 30 tablet; Refill: 0  - XR LUMBAR SPINE (2-3 VIEWS); Future  - External Referral To Physical Therapy    - Rest of systems unchanged, continue current treatments. - Medications, labs, diagnostic studies, consultations and follow-up as documented in this encounter.  Rest of systems unchanged, continue current treatments    Rich Her, APRN-CNP

## 2020-08-11 NOTE — PROGRESS NOTES
Visit Information    Have you changed or started any medications since your last visit including any over-the-counter medicines, vitamins, or herbal medicines? no   Are you having any side effects from any of your medications? -  no  Have you stopped taking any of your medications? Is so, why? -  no    Have you seen any other physician or provider since your last visit? No  Have you had any other diagnostic tests since your last visit? No  Have you been seen in the emergency room and/or had an admission to a hospital since we last saw you? No  Have you had your routine dental cleaning in the past 6 months? no    Have you activated your [employer] account? If not, what are your barriers?  Yes     Patient Care Team:  Marcellus Roberson MD as PCP - General (Family Medicine)  Marcellus Roberson MD as PCP - [facility]  Forrest Gomez MD as Consulting Physician (Rheumatology)  Tyrel Rosales as Consulting Physician (Obstetrics & Gynecology)  Yulsia Washington MD as Surgeon (General Surgery)    Medical History Review  Past Medical, Family, and Social History reviewed and does not contribute to the patient presenting condition    Health Maintenance   Topic Date Due    Varicella vaccine (1 of 2 - 2-dose childhood series) 03/09/1990    Flu vaccine (1) 09/01/2020    Cervical cancer screen  02/21/2021    DTaP/Tdap/Td vaccine (2 - Td) 08/15/2026    HIV screen  Completed    Hepatitis A vaccine  Aged Out    Hepatitis B vaccine  Aged Out    Hib vaccine  Aged Out    Meningococcal (ACWY) vaccine  Aged Out    Pneumococcal 0-64 years Vaccine  Aged Out

## 2020-08-12 ASSESSMENT — ENCOUNTER SYMPTOMS
ABDOMINAL DISTENTION: 0
SHORTNESS OF BREATH: 0
ABDOMINAL PAIN: 0
CONSTIPATION: 0
SORE THROAT: 0
RHINORRHEA: 0
DIARRHEA: 0
BACK PAIN: 1
VOMITING: 0
CHEST TIGHTNESS: 0
NAUSEA: 0
COUGH: 0

## 2020-08-26 ENCOUNTER — HOSPITAL ENCOUNTER (OUTPATIENT)
Age: 30
Discharge: HOME | End: 2020-08-28
Payer: COMMERCIAL

## 2020-08-26 ENCOUNTER — HOSPITAL ENCOUNTER (OUTPATIENT)
Dept: GENERAL RADIOLOGY | Age: 30
Discharge: HOME | End: 2020-08-28
Payer: COMMERCIAL

## 2020-08-26 PROCEDURE — 72100 X-RAY EXAM L-S SPINE 2/3 VWS: CPT

## 2020-08-27 RX ORDER — ALBUTEROL SULFATE 90 UG/1
2 AEROSOL, METERED RESPIRATORY (INHALATION) EVERY 6 HOURS PRN
Qty: 3 INHALER | Refills: 3 | Status: SHIPPED | OUTPATIENT
Start: 2020-08-27

## 2020-10-12 ENCOUNTER — PATIENT MESSAGE (OUTPATIENT)
Dept: FAMILY MEDICINE CLINIC | Age: 30
End: 2020-10-12

## 2020-10-12 NOTE — TELEPHONE ENCOUNTER
From: John Mireles  To: Zahida Rodgers MD  Sent: 10/12/2020 2:20 PM EDT  Subject: Non-Urgent Medical Question    Hello! Nayla had some random bruising the picture attached, bruises on my stomach. 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 My family is concerned so sending you this message to see if I need to make an appointment ?

## 2020-10-13 ENCOUNTER — HOSPITAL ENCOUNTER (OUTPATIENT)
Age: 30
Discharge: HOME | End: 2020-10-13
Payer: COMMERCIAL

## 2020-10-13 LAB
ALBUMIN SERPL-MCNC: 4.4 G/DL (ref 3.5–5.2)
ALBUMIN/GLOBULIN RATIO: 1.6 (ref 1–2.5)
ALP BLD-CCNC: 113 U/L (ref 35–104)
ALT SERPL-CCNC: 19 U/L (ref 5–33)
ANION GAP SERPL CALCULATED.3IONS-SCNC: 11 MMOL/L (ref 9–17)
AST SERPL-CCNC: 18 U/L
BILIRUB SERPL-MCNC: 0.46 MG/DL (ref 0.3–1.2)
BUN BLDV-MCNC: 10 MG/DL (ref 6–20)
BUN/CREAT BLD: ABNORMAL (ref 9–20)
CALCIUM SERPL-MCNC: 9.4 MG/DL (ref 8.6–10.4)
CHLORIDE BLD-SCNC: 102 MMOL/L (ref 98–107)
CO2: 25 MMOL/L (ref 20–31)
CREAT SERPL-MCNC: 0.88 MG/DL (ref 0.5–0.9)
GFR AFRICAN AMERICAN: >60 ML/MIN
GFR NON-AFRICAN AMERICAN: >60 ML/MIN
GFR SERPL CREATININE-BSD FRML MDRD: ABNORMAL ML/MIN/{1.73_M2}
GFR SERPL CREATININE-BSD FRML MDRD: ABNORMAL ML/MIN/{1.73_M2}
GLUCOSE BLD-MCNC: 94 MG/DL (ref 70–99)
HCT VFR BLD CALC: 43.1 % (ref 36.3–47.1)
HEMOGLOBIN: 14 G/DL (ref 11.9–15.1)
MCH RBC QN AUTO: 28.5 PG (ref 25.2–33.5)
MCHC RBC AUTO-ENTMCNC: 32.5 G/DL (ref 28.4–34.8)
MCV RBC AUTO: 87.6 FL (ref 82.6–102.9)
NRBC AUTOMATED: 0 PER 100 WBC
PDW BLD-RTO: 12.8 % (ref 11.8–14.4)
PLATELET # BLD: 235 K/UL (ref 138–453)
PMV BLD AUTO: 10 FL (ref 8.1–13.5)
POTASSIUM SERPL-SCNC: 3.8 MMOL/L (ref 3.7–5.3)
RBC # BLD: 4.92 M/UL (ref 3.95–5.11)
SODIUM BLD-SCNC: 138 MMOL/L (ref 135–144)
THYROXINE, FREE: 1.04 NG/DL (ref 0.93–1.7)
TOTAL PROTEIN: 7.1 G/DL (ref 6.4–8.3)
TSH SERPL DL<=0.05 MIU/L-ACNC: 1.31 MIU/L (ref 0.3–5)
WBC # BLD: 7.6 K/UL (ref 3.5–11.3)

## 2020-10-13 PROCEDURE — 36415 COLL VENOUS BLD VENIPUNCTURE: CPT

## 2020-10-13 PROCEDURE — 84443 ASSAY THYROID STIM HORMONE: CPT

## 2020-10-13 PROCEDURE — 85027 COMPLETE CBC AUTOMATED: CPT

## 2020-10-13 PROCEDURE — 80053 COMPREHEN METABOLIC PANEL: CPT

## 2020-10-13 PROCEDURE — 84439 ASSAY OF FREE THYROXINE: CPT

## 2020-12-04 ENCOUNTER — OFFICE VISIT (OUTPATIENT)
Dept: FAMILY MEDICINE CLINIC | Age: 30
End: 2020-12-04
Payer: COMMERCIAL

## 2020-12-04 VITALS
BODY MASS INDEX: 35.97 KG/M2 | HEIGHT: 63 IN | OXYGEN SATURATION: 98 % | RESPIRATION RATE: 16 BRPM | WEIGHT: 203 LBS | SYSTOLIC BLOOD PRESSURE: 116 MMHG | DIASTOLIC BLOOD PRESSURE: 78 MMHG | TEMPERATURE: 98.8 F | HEART RATE: 86 BPM

## 2020-12-04 PROCEDURE — G8482 FLU IMMUNIZE ORDER/ADMIN: HCPCS | Performed by: FAMILY MEDICINE

## 2020-12-04 PROCEDURE — G8417 CALC BMI ABV UP PARAM F/U: HCPCS | Performed by: FAMILY MEDICINE

## 2020-12-04 PROCEDURE — 90471 IMMUNIZATION ADMIN: CPT | Performed by: FAMILY MEDICINE

## 2020-12-04 PROCEDURE — 1036F TOBACCO NON-USER: CPT | Performed by: FAMILY MEDICINE

## 2020-12-04 PROCEDURE — 90686 IIV4 VACC NO PRSV 0.5 ML IM: CPT | Performed by: FAMILY MEDICINE

## 2020-12-04 PROCEDURE — 99214 OFFICE O/P EST MOD 30 MIN: CPT | Performed by: FAMILY MEDICINE

## 2020-12-04 PROCEDURE — G8427 DOCREV CUR MEDS BY ELIG CLIN: HCPCS | Performed by: FAMILY MEDICINE

## 2020-12-04 RX ORDER — GABAPENTIN 300 MG/1
300 CAPSULE ORAL 3 TIMES DAILY
Qty: 90 CAPSULE | Refills: 1 | Status: SHIPPED | OUTPATIENT
Start: 2020-12-04 | End: 2022-01-04 | Stop reason: SDUPTHER

## 2020-12-04 RX ORDER — MELOXICAM 15 MG/1
15 TABLET ORAL DAILY
Qty: 30 TABLET | Refills: 3 | Status: SHIPPED | OUTPATIENT
Start: 2020-12-04 | End: 2021-04-15

## 2020-12-04 RX ORDER — DULOXETIN HYDROCHLORIDE 30 MG/1
30 CAPSULE, DELAYED RELEASE ORAL DAILY
Qty: 30 CAPSULE | Refills: 3 | Status: SHIPPED | OUTPATIENT
Start: 2020-12-04 | End: 2021-04-15 | Stop reason: ALTCHOICE

## 2020-12-04 ASSESSMENT — ENCOUNTER SYMPTOMS
SHORTNESS OF BREATH: 0
ABDOMINAL PAIN: 0
ABDOMINAL DISTENTION: 0
RHINORRHEA: 0
SORE THROAT: 0
DIARRHEA: 0
NAUSEA: 0
COUGH: 0
CONSTIPATION: 0
CHEST TIGHTNESS: 0
BACK PAIN: 1
VOMITING: 0

## 2020-12-04 NOTE — PROGRESS NOTES
Debra Jacob MD    [address] [place] [address] [facility]  [address] [address], [address]  [address] [address]  Dept: [phone]  Dept Fax: [phone]     Patient ID: Ole Carvajal is a 31 y.o. female. HPI    Pt here today for f/u on chronic medical problems Lumbar DDD, Depression, Anxiety,go over labs and/or diagnostic studies, and medication refills. Pt denies any fever or chills. Pt today denies any HA, chest pain, or SOB. Pt denies any N/V/D/C or abdominal pain. Pt is c/o worsening back pain with radiation down both legs more on the left. Some numbness in her toes. She is also c/o urinary incontinence. She did take the Prednisone and Gabapentin, but did see lasting results. She has been taking OTC Ibuprofen. Pt has also feeling depressed with everything going on and does have a child with an immunodeficiency and cannot really get out and do anything because of bringing Covid. That is why she never did PT. Otherwise pt doing well on current tx and no other concerns today. The patient's past medical, surgical, social, and family history as well as his current medications and allergies were reviewed as documented in today's encounter. Current Outpatient Medications on File Prior to Visit   Medication Sig Dispense Refill    albuterol sulfate HFA (PROAIR HFA) 108 (90 Base) MCG/ACT inhaler Inhale 2 puffs into the lungs every 6 hours as needed for Wheezing 3 Inhaler 3     No current facility-administered medications on file prior to visit. Subjective:     Review of Systems   Constitutional: Negative for appetite change, fatigue and fever. HENT: Negative for congestion, ear pain, rhinorrhea and sore throat. Respiratory: Negative for cough, chest tightness and shortness of breath. Cardiovascular: Negative for chest pain and palpitations.    Gastrointestinal: Negative for abdominal distention, abdominal pain, constipation, diarrhea, nausea and vomiting. Genitourinary: Positive for difficulty urinating (incontinence). Negative for dysuria. Musculoskeletal: Positive for arthralgias and back pain (with radiation down both legs). Negative for myalgias. Skin: Negative for rash. Neurological: Negative for dizziness, weakness, light-headedness and headaches. Hematological: Negative for adenopathy. Bruises/bleeds easily. Psychiatric/Behavioral: Positive for dysphoric mood. Negative for behavioral problems and sleep disturbance. The patient is nervous/anxious. Objective:     Physical Exam  Vitals signs reviewed. Constitutional:       General: She is not in acute distress. Appearance: She is well-developed. HENT:      Head: Normocephalic and atraumatic. Right Ear: External ear normal.      Left Ear: External ear normal.      Nose: Nose normal.      Mouth/Throat:      Pharynx: No oropharyngeal exudate. Eyes:      Conjunctiva/sclera: Conjunctivae normal.      Pupils: Pupils are equal, round, and reactive to light. Neck:      Musculoskeletal: Normal range of motion. Cardiovascular:      Rate and Rhythm: Normal rate and regular rhythm. Heart sounds: Normal heart sounds. No murmur. Pulmonary:      Effort: Pulmonary effort is normal. No respiratory distress. Breath sounds: Normal breath sounds. No wheezing. Chest:      Chest wall: No tenderness. Abdominal:      General: Bowel sounds are normal. There is no distension. Palpations: Abdomen is soft. There is no mass. Tenderness: There is no abdominal tenderness. Musculoskeletal: Normal range of motion. General: No tenderness. Lymphadenopathy:      Cervical: No cervical adenopathy. Skin:     Findings: No rash. Neurological:      Mental Status: She is alert and oriented to person, place, and time. Deep Tendon Reflexes: Reflexes are normal and symmetric.    Psychiatric:         Behavior: Behavior normal.     Lumbar x-ray and MRI from 2017 reviewed    Assessment:      Diagnosis Orders   1. Lumbar disc disease  meloxicam (MOBIC) 15 MG tablet    DULoxetine (CYMBALTA) 30 MG extended release capsule    MRI [address], MD, Pain ManagementJenifer   2. Easy bruising     3. Need for influenza vaccination  INFLUENZA, QUADV, 3 YRS AND OLDER, IM PF, PREFILL SYR OR SDV, 0.5ML (AFLURIA QUADV, PF)   4. Chronic bilateral low back pain with sciatica, sciatica laterality unspecified  meloxicam (MOBIC) 15 MG tablet    DULoxetine (CYMBALTA) 30 MG extended release capsule    MRI [address], MD, Pain ManagementJenifer   5. Depression, unspecified depression type  DULoxetine (CYMBALTA) 30 MG extended release capsule   6. Anxiety  DULoxetine (CYMBALTA) 30 MG extended release capsule   7. Juvenile rheumatoid arthritis ([facility] [address]     8. Other urinary incontinence           Plan:     Orders Placed This Encounter   Procedures    MRI BRAIN WO CONTRAST     Standing Status:   Future     Standing Expiration Date:   12/4/2021    INFLUENZA, QUADV, 3 YRS AND OLDER, IM PF, PREFILL SYR OR SDV, 0.5ML ([place] [facility], PF)   Liss Fermin MD, Pain ManagementJenifer     Referral Priority:   Routine     Referral Type:   Eval and Treat     Referral Reason:   Specialty Services Required     Referred to Provider:   Rahel Pacheco MD     Requested Specialty:   Pain Management     Number of Visits Requested:   1      Orders Placed This Encounter   Medications    gabapentin (NEURONTIN) 300 MG capsule     Sig: Take 1 capsule by mouth 3 times daily for 30 days.      Dispense:  90 capsule     Refill:  1    meloxicam (MOBIC) 15 MG tablet     Sig: Take 1 tablet by mouth daily     Dispense:  30 tablet     Refill:  3    DULoxetine (CYMBALTA) 30 MG extended release capsule     Sig: Take 1 capsule by mouth daily     Dispense:  30 capsule     Refill:  3     With her ongoing back pain and evidence of lumbar DDD and disc bulge and not able to do PT because of Covid, will proceed with an MRI lumbar spine, will stop the Ibuprofen and start Mobic as ordered, increase the Gabapentin 300mg and titrate up to three times per day, and start the Cymbalta 30mg which has been shown to help with chronic pain and neuropathic pain    I am also going to refer her to Dr. Monserrat Ventuar for possible injictions    Will start the Cymbalta 30mg daily for her depression/anciety and have her back in a month to see how she is doing    Pt is going to follow with a new rheumatologist, but cannot think of the name and will let me know if she does need a referral    Rest of systems unchanged, continue current treatments. Medications, labs, diagnostic studies, consultations and follow-up as documented in this encounter. Rest of systems unchanged, continue current treatments    I have spent 15 minutes face to face with the patient more than 50% of this time was spent counseling and coordinating care. Debra Gardner MD

## 2020-12-04 NOTE — PROGRESS NOTES
Visit Information    Have you changed or started any medications since your last visit including any over-the-counter medicines, vitamins, or herbal medicines? no   Have you stopped taking any of your medications? Is so, why? -  no  Are you having any side effects from any of your medications? - no    Have you seen any other physician or provider since your last visit?  no   Have you had any other diagnostic tests since your last visit? yes - labs   Have you been seen in the emergency room and/or had an admission in a hospital since we last saw you?  no   Have you had your routine dental cleaning in the past 6 months?  no     Do you have an active MyChart account? If no, what is the barrier? Yes    Patient Care Team:  Brandan Martell MD as PCP - General (Family Medicine)  Brandan Martell MD as PCP - [facility]  Ashley Sutton MD as Consulting Physician (Rheumatology)  Ronny Todd as Consulting Physician (Obstetrics & Gynecology)  Nadya Mccall MD as Surgeon (General Surgery)    Medical History Review  Past Medical, Family, and Social History reviewed and does not contribute to the patient presenting condition    Health Maintenance   Topic Date Due    Flu vaccine (1) 09/01/2020    Cervical cancer screen  02/21/2021    DTaP/Tdap/Td vaccine (2 - Td) 08/15/2026    HIV screen  Completed    Hepatitis A vaccine  Aged Out    Hepatitis B vaccine  Aged Out    Hib vaccine  Aged Out    Meningococcal (ACWY) vaccine  Aged Out    Pneumococcal 0-64 years Vaccine  Aged Out    Varicella vaccine  Discontinued     Patient/Caregiver verbalize understanding of medications. Yes    Vaccine Information Sheet, \"Influenza - Inactivated\"  given to Dashawn Dolan, or parent/legal guardian of  Dashawn Dolan and verbalized understanding. Patient responses:    Have you ever had a reaction to a flu vaccine? No  Are you able to eat eggs without adverse effects?   Yes  Do you have any current illness? No  Have you ever had Guillian Morgan Syndrome? No    Flu vaccine given per order. Please see immunization tab.

## 2020-12-08 ENCOUNTER — TELEPHONE (OUTPATIENT)
Dept: FAMILY MEDICINE CLINIC | Age: 30
End: 2020-12-08

## 2020-12-08 NOTE — TELEPHONE ENCOUNTER
Patient was in on 12/4 for appt. She is to be scheduled for MRI of back. She went to schedule and her order says MRI of brain. Can this be changed please.

## 2020-12-30 ENCOUNTER — HOSPITAL ENCOUNTER (OUTPATIENT)
Dept: MRI IMAGING | Age: 30
Discharge: HOME | End: 2021-01-01
Payer: COMMERCIAL

## 2020-12-30 DIAGNOSIS — G89.29 CHRONIC BILATERAL LOW BACK PAIN WITH SCIATICA, SCIATICA LATERALITY UNSPECIFIED: ICD-10-CM

## 2020-12-30 DIAGNOSIS — M51.9 LUMBAR DISC DISEASE: ICD-10-CM

## 2020-12-30 DIAGNOSIS — M54.40 CHRONIC BILATERAL LOW BACK PAIN WITH SCIATICA, SCIATICA LATERALITY UNSPECIFIED: ICD-10-CM

## 2020-12-30 PROCEDURE — 72148 MRI LUMBAR SPINE W/O DYE: CPT

## 2021-01-28 ENCOUNTER — INITIAL CONSULT (OUTPATIENT)
Dept: PAIN MANAGEMENT | Age: 31
End: 2021-01-28
Payer: COMMERCIAL

## 2021-01-28 VITALS — BODY MASS INDEX: 33.13 KG/M2 | HEIGHT: 62 IN | WEIGHT: 180 LBS

## 2021-01-28 DIAGNOSIS — G89.29 OTHER CHRONIC PAIN: ICD-10-CM

## 2021-01-28 DIAGNOSIS — M51.36 DEGENERATION OF LUMBAR INTERVERTEBRAL DISC: ICD-10-CM

## 2021-01-28 DIAGNOSIS — M47.817 LUMBOSACRAL SPONDYLOSIS WITHOUT MYELOPATHY: Primary | ICD-10-CM

## 2021-01-28 DIAGNOSIS — M48.07 NEURAL FORAMINAL STENOSIS OF LUMBOSACRAL SPINE: ICD-10-CM

## 2021-01-28 PROCEDURE — 1036F TOBACCO NON-USER: CPT | Performed by: PAIN MEDICINE

## 2021-01-28 PROCEDURE — G8417 CALC BMI ABV UP PARAM F/U: HCPCS | Performed by: PAIN MEDICINE

## 2021-01-28 PROCEDURE — G8427 DOCREV CUR MEDS BY ELIG CLIN: HCPCS | Performed by: PAIN MEDICINE

## 2021-01-28 PROCEDURE — G8482 FLU IMMUNIZE ORDER/ADMIN: HCPCS | Performed by: PAIN MEDICINE

## 2021-01-28 PROCEDURE — 99203 OFFICE O/P NEW LOW 30 MIN: CPT | Performed by: PAIN MEDICINE

## 2021-01-28 ASSESSMENT — ENCOUNTER SYMPTOMS
ABDOMINAL PAIN: 1
BOWEL INCONTINENCE: 1
BACK PAIN: 1

## 2021-01-28 NOTE — PROGRESS NOTES
HPI:     Back Pain  This is a chronic problem. The current episode started more than 1 year ago. The problem occurs constantly. The problem has been rapidly worsening since onset. The pain is present in the lumbar spine. The quality of the pain is described as aching, burning, shooting and stabbing (sharp and tingling ). The pain radiates to the left knee, left thigh, left foot, right knee, right thigh and right foot. The pain is at a severity of 6/10. The pain is moderate. The pain is the same all the time. The symptoms are aggravated by bending, coughing, position, lying down, standing, sitting, stress and twisting. Stiffness is present all day. Associated symptoms include abdominal pain, bladder incontinence, bowel incontinence, chest pain, headaches, leg pain, numbness, pelvic pain, tingling, weakness and weight loss. Pertinent negatives include no dysuria, fever, paresis, paresthesias or perianal numbness. MRI of the lumbar spine with degenerative changes and disc bulging. She is done physical therapy continues to have pain. She is seemingly had epidural steroid injections in [place] very short-lived benefit. Patient denies any new neurological symptoms. No bowel or bladder incontinence, no weakness, and no falling. Review of OARRS does not show any aberrant prescription behavior.      Past Medical History:   Diagnosis Date    Chronic sinus infection     Cluster headache     History of sepsis 2016    post C-Birth/partial    HSP (Henoch Schonlein purpura) (HCC)     Juvenile rheumatoid arthritis ([facility] [address]     Dr. Justyn Pathak traumatic stress disorder (PTSD)     Uveitis        Past Surgical History:   Procedure Laterality Date     SECTION  2016    EYE SURGERY Left     cataract with lens implant    HYSTERECTOMY  2016    partial    KNEE ARTHROSCOPY Right     X's 3 for meniscal tears    CT ERCP DX COLLECTION SPECIMEN BRUSHING/WASHING N/A 3/14/2018 ERCP ENDOSCOPIC RETROGRADE CHOLANGIOPANCREATOGRAPHY WITH BALLOON SWEEP performed by Noah Blanton MD at [address] [facility] N/A 3/16/2018    LAPAROSCOPIC ROBOTIC ASSISTED MULTIPORT CHOLECYSTECTOMY performed by Ernestine Guan MD at [address]         Allergies   Allergen Reactions   Katie Talavera [Tofacitinib] Anaphylaxis and Other (See Comments)     gastritis    Ceclor [Cefaclor] Hives    Cephalosporins      hives         Current Outpatient Medications:     gabapentin (NEURONTIN) 300 MG capsule, Take 1 capsule by mouth 3 times daily for 30 days. , Disp: 90 capsule, Rfl: 1    meloxicam (MOBIC) 15 MG tablet, Take 1 tablet by mouth daily, Disp: 30 tablet, Rfl: 3    DULoxetine (CYMBALTA) 30 MG extended release capsule, Take 1 capsule by mouth daily, Disp: 30 capsule, Rfl: 3    albuterol sulfate HFA (PROAIR HFA) 108 (90 Base) MCG/ACT inhaler, Inhale 2 puffs into the lungs every 6 hours as needed for Wheezing, Disp: 3 Inhaler, Rfl: 3    Family History   Problem Relation Age of Onset    High Blood Pressure Mother     Breast Cancer Maternal Aunt     Diabetes Maternal Grandmother     Stroke Maternal Grandfather     Prostate Cancer Paternal Grandfather        Social History     Socioeconomic History    Marital status:      Spouse name: Not on file    Number of children: Not on file    Years of education: Not on file    Highest education level: Not on file   Occupational History    Not on file   Social Needs    Financial resource strain: Not on file    Food insecurity     Worry: Not on file     Inability: Not on file    Transportation needs     Medical: Not on file     Non-medical: Not on file   Tobacco Use    Smoking status: Former Smoker     Packs/day: 0.25     Types: Cigarettes     Start date: 5/15/2014     Quit date: 3/5/2018     Years since quittin.9    Smokeless tobacco: Never Used   Substance and Sexual Activity    Alcohol use:  Yes Comment: socially    Drug use: Yes     Types: Marijuana     Comment: for symptom relief    Sexual activity: Not on file   Lifestyle    Physical activity     Days per week: Not on file     Minutes per session: Not on file    Stress: Not on file   Relationships    Social connections     Talks on phone: Not on file     Gets together: Not on file     Attends Confucianist service: Not on file     Active member of club or organization: Not on file     Attends meetings of clubs or organizations: Not on file     Relationship status: Not on file    Intimate partner violence     Fear of current or ex partner: Not on file     Emotionally abused: Not on file     Physically abused: Not on file     Forced sexual activity: Not on file   Other Topics Concern    Not on file   Social History Narrative    Not on file       Review of Systems:  Review of Systems   Constitution: Positive for weight loss. Negative for fever. Cardiovascular: Positive for chest pain. Musculoskeletal: Positive for back pain. Gastrointestinal: Positive for abdominal pain and bowel incontinence. Genitourinary: Positive for bladder incontinence and pelvic pain. Negative for dysuria. Neurological: Positive for headaches, numbness, tingling and weakness. Negative for paresthesias. Physical Exam:  Ht 5' 2\" (1.575 m)   Wt 180 lb (81.6 kg)   BMI 32.92 kg/m²     Physical Exam  Constitutional:       Appearance: Normal appearance. Pulmonary:      Effort: Pulmonary effort is normal.   Neurological:      Mental Status: She is alert.    Psychiatric:         Attention and Perception: Attention and perception normal.         Mood and Affect: Mood and affect normal.       Record/Diagnostics Review:    As above, I did review the imaging    Orders:  Orders Placed This Encounter   Procedures    IA INJ DX/THER AGNT PARAVERT FACET JOINT, LUMBAR/SAC, 1ST LEVEL    IA INJ DX/THER AGNT PARAVERT FACET JOINT, LUMBAR/SAC, 2ND LEVEL       Assessment: 1. Lumbosacral spondylosis without myelopathy    2. Other chronic pain    3. Degeneration of lumbar intervertebral disc    4. Neural foraminal stenosis of lumbosacral spine        Treatment Plan:  DISCUSSION: Treatment options discussed with patient and all questions answered to patient's satisfaction. OARRS Review: Reviewed and acceptable for medications prescribed. TREATMENT OPTIONS:     Discussed different treatment options including continued conservative care such as physical therapy, chiropractic care, acupuncture. Discussed different interventional options such as epidural steroids or medial branch blocks. Also discussed neuromodulation in the form of spinal cord stimulation. Also discussed surgical evaluation. Has failed conservative options, significant axial low back pain with degenerative facet changes on MRI, good candidate for diagnostic lumbar facets at bilateral L4-5 and L5-S1 to see if pain is facet mediated, if this is beneficial would be a candidate for radiofrequency ablation. Consider epidural steroids in the future, she has seemingly had these in [place] with short-lived benefit. Faye Mercado M.D. I have reviewed the chief complaint and history of present illness (including ROS and PFSH) and vital documentation by my staff and I agree with their documentation and have added where applicable.

## 2021-02-08 ENCOUNTER — HOSPITAL ENCOUNTER (OUTPATIENT)
Dept: LAB | Age: 31
Setting detail: SPECIMEN
Discharge: HOME | End: 2021-02-08
Payer: COMMERCIAL

## 2021-02-08 DIAGNOSIS — Z20.822 COVID-19 RULED OUT BY LABORATORY TESTING: Primary | ICD-10-CM

## 2021-02-08 PROCEDURE — U0003 INFECTIOUS AGENT DETECTION BY NUCLEIC ACID (DNA OR RNA); SEVERE ACUTE RESPIRATORY SYNDROME CORONAVIRUS 2 (SARS-COV-2) (CORONAVIRUS DISEASE [COVID-19]), AMPLIFIED PROBE TECHNIQUE, MAKING USE OF HIGH THROUGHPUT TECHNOLOGIES AS DESCRIBED BY CMS-2020-01-R: HCPCS

## 2021-02-08 PROCEDURE — U0005 INFEC AGEN DETEC AMPLI PROBE: HCPCS

## 2021-02-09 LAB
SARS-COV-2, RAPID: NORMAL
SARS-COV-2: NORMAL
SARS-COV-2: NOT DETECTED
SOURCE: NORMAL

## 2021-02-10 ENCOUNTER — ANESTHESIA EVENT (OUTPATIENT)
Dept: OPERATING ROOM | Age: 31
End: 2021-02-10
Payer: COMMERCIAL

## 2021-02-10 RX ORDER — SODIUM CHLORIDE 0.9 % (FLUSH) 0.9 %
10 SYRINGE (ML) INJECTION PRN
Status: CANCELLED | OUTPATIENT
Start: 2021-02-10

## 2021-02-10 RX ORDER — SODIUM CHLORIDE 0.9 % (FLUSH) 0.9 %
10 SYRINGE (ML) INJECTION EVERY 12 HOURS SCHEDULED
Status: CANCELLED | OUTPATIENT
Start: 2021-02-10

## 2021-02-12 ENCOUNTER — HOSPITAL ENCOUNTER (OUTPATIENT)
Age: 31
Setting detail: OUTPATIENT SURGERY
Discharge: HOME | End: 2021-02-12
Attending: PAIN MEDICINE | Admitting: PAIN MEDICINE
Payer: COMMERCIAL

## 2021-02-12 ENCOUNTER — APPOINTMENT (OUTPATIENT)
Dept: GENERAL RADIOLOGY | Age: 31
End: 2021-02-12
Attending: PAIN MEDICINE
Payer: COMMERCIAL

## 2021-02-12 ENCOUNTER — ANESTHESIA (OUTPATIENT)
Dept: OPERATING ROOM | Age: 31
End: 2021-02-12
Payer: COMMERCIAL

## 2021-02-12 VITALS
RESPIRATION RATE: 14 BRPM | DIASTOLIC BLOOD PRESSURE: 74 MMHG | HEART RATE: 90 BPM | WEIGHT: 200.25 LBS | BODY MASS INDEX: 35.48 KG/M2 | TEMPERATURE: 98 F | HEIGHT: 63 IN | SYSTOLIC BLOOD PRESSURE: 119 MMHG | OXYGEN SATURATION: 96 %

## 2021-02-12 VITALS
DIASTOLIC BLOOD PRESSURE: 89 MMHG | SYSTOLIC BLOOD PRESSURE: 141 MMHG | RESPIRATION RATE: 19 BRPM | OXYGEN SATURATION: 99 %

## 2021-02-12 PROCEDURE — 64494 INJ PARAVERT F JNT L/S 2 LEV: CPT | Performed by: PAIN MEDICINE

## 2021-02-12 PROCEDURE — 3600000002 HC SURGERY LEVEL 2 BASE: Performed by: PAIN MEDICINE

## 2021-02-12 PROCEDURE — 2709999900 HC NON-CHARGEABLE SUPPLY: Performed by: PAIN MEDICINE

## 2021-02-12 PROCEDURE — 6360000002 HC RX W HCPCS: Performed by: ANESTHESIOLOGY

## 2021-02-12 PROCEDURE — 7100000010 HC PHASE II RECOVERY - FIRST 15 MIN: Performed by: PAIN MEDICINE

## 2021-02-12 PROCEDURE — 7100000011 HC PHASE II RECOVERY - ADDTL 15 MIN: Performed by: PAIN MEDICINE

## 2021-02-12 PROCEDURE — 64493 INJ PARAVERT F JNT L/S 1 LEV: CPT | Performed by: PAIN MEDICINE

## 2021-02-12 PROCEDURE — 3700000001 HC ADD 15 MINUTES (ANESTHESIA): Performed by: PAIN MEDICINE

## 2021-02-12 PROCEDURE — 3209999900 FLUORO FOR SURGICAL PROCEDURES

## 2021-02-12 PROCEDURE — 3600000012 HC SURGERY LEVEL 2 ADDTL 15MIN: Performed by: PAIN MEDICINE

## 2021-02-12 PROCEDURE — 3700000000 HC ANESTHESIA ATTENDED CARE: Performed by: PAIN MEDICINE

## 2021-02-12 PROCEDURE — 2500000003 HC RX 250 WO HCPCS: Performed by: PAIN MEDICINE

## 2021-02-12 RX ORDER — HYDRALAZINE HYDROCHLORIDE 20 MG/ML
5 INJECTION INTRAMUSCULAR; INTRAVENOUS EVERY 10 MIN PRN
Status: DISCONTINUED | OUTPATIENT
Start: 2021-02-12 | End: 2021-02-12 | Stop reason: HOSPADM

## 2021-02-12 RX ORDER — MORPHINE SULFATE 1 MG/ML
1 INJECTION, SOLUTION EPIDURAL; INTRATHECAL; INTRAVENOUS EVERY 5 MIN PRN
Status: DISCONTINUED | OUTPATIENT
Start: 2021-02-12 | End: 2021-02-12 | Stop reason: HOSPADM

## 2021-02-12 RX ORDER — MEPERIDINE HYDROCHLORIDE 50 MG/ML
12.5 INJECTION INTRAMUSCULAR; INTRAVENOUS; SUBCUTANEOUS EVERY 5 MIN PRN
Status: DISCONTINUED | OUTPATIENT
Start: 2021-02-12 | End: 2021-02-12 | Stop reason: HOSPADM

## 2021-02-12 RX ORDER — HYDROCODONE BITARTRATE AND ACETAMINOPHEN 5; 325 MG/1; MG/1
2 TABLET ORAL PRN
Status: DISCONTINUED | OUTPATIENT
Start: 2021-02-12 | End: 2021-02-12 | Stop reason: HOSPADM

## 2021-02-12 RX ORDER — FENTANYL CITRATE 50 UG/ML
25 INJECTION, SOLUTION INTRAMUSCULAR; INTRAVENOUS EVERY 5 MIN PRN
Status: DISCONTINUED | OUTPATIENT
Start: 2021-02-12 | End: 2021-02-12 | Stop reason: HOSPADM

## 2021-02-12 RX ORDER — ONDANSETRON 2 MG/ML
4 INJECTION INTRAMUSCULAR; INTRAVENOUS
Status: DISCONTINUED | OUTPATIENT
Start: 2021-02-12 | End: 2021-02-12 | Stop reason: HOSPADM

## 2021-02-12 RX ORDER — MIDAZOLAM HYDROCHLORIDE 1 MG/ML
INJECTION INTRAMUSCULAR; INTRAVENOUS PRN
Status: DISCONTINUED | OUTPATIENT
Start: 2021-02-12 | End: 2021-02-12 | Stop reason: SDUPTHER

## 2021-02-12 RX ORDER — FENTANYL CITRATE 50 UG/ML
INJECTION, SOLUTION INTRAMUSCULAR; INTRAVENOUS PRN
Status: DISCONTINUED | OUTPATIENT
Start: 2021-02-12 | End: 2021-02-12 | Stop reason: SDUPTHER

## 2021-02-12 RX ORDER — PROMETHAZINE HYDROCHLORIDE 25 MG/ML
6.25 INJECTION, SOLUTION INTRAMUSCULAR; INTRAVENOUS
Status: DISCONTINUED | OUTPATIENT
Start: 2021-02-12 | End: 2021-02-12 | Stop reason: HOSPADM

## 2021-02-12 RX ORDER — BUPIVACAINE HYDROCHLORIDE 2.5 MG/ML
INJECTION, SOLUTION INFILTRATION; PERINEURAL PRN
Status: DISCONTINUED | OUTPATIENT
Start: 2021-02-12 | End: 2021-02-12 | Stop reason: ALTCHOICE

## 2021-02-12 RX ORDER — DIPHENHYDRAMINE HYDROCHLORIDE 50 MG/ML
12.5 INJECTION INTRAMUSCULAR; INTRAVENOUS
Status: DISCONTINUED | OUTPATIENT
Start: 2021-02-12 | End: 2021-02-12 | Stop reason: HOSPADM

## 2021-02-12 RX ORDER — HYDROCODONE BITARTRATE AND ACETAMINOPHEN 5; 325 MG/1; MG/1
1 TABLET ORAL PRN
Status: DISCONTINUED | OUTPATIENT
Start: 2021-02-12 | End: 2021-02-12 | Stop reason: HOSPADM

## 2021-02-12 RX ADMIN — MIDAZOLAM HYDROCHLORIDE 2 MG: 1 INJECTION, SOLUTION INTRAMUSCULAR; INTRAVENOUS at 11:58

## 2021-02-12 RX ADMIN — FENTANYL CITRATE 100 MCG: 50 INJECTION, SOLUTION INTRAMUSCULAR; INTRAVENOUS at 11:58

## 2021-02-12 ASSESSMENT — PULMONARY FUNCTION TESTS
PIF_VALUE: 1

## 2021-02-12 ASSESSMENT — PAIN - FUNCTIONAL ASSESSMENT: PAIN_FUNCTIONAL_ASSESSMENT: 0-10

## 2021-02-12 NOTE — ANESTHESIA POSTPROCEDURE EVALUATION
Department of Anesthesiology  Postprocedure Note    Patient: Good العراقي  MRN: [id number]  YOB: 1990  Date of evaluation: 2/12/2021  Time:  12:08 PM     Procedure Summary     Date: 02/12/21 Room / Location: [address] [place] [address] N / [address] [facility]    Anesthesia Start: 1150 Anesthesia Stop: 0829    Procedure: NERVE BLOCK BILATERAL -MBB #1 L4-5, L5-S1 (Bilateral ) Diagnosis: (M47.817 SPONDYLOSIS WITHOUT MYELOPATHY)    Surgeons: Constanza Lam MD Responsible Provider: Jama Jason MD    Anesthesia Type: MAC ASA Status: 2          Anesthesia Type: MAC    Roro Phase I: Roro Score: 10    Roro Phase II:      Last vitals: Reviewed and per EMR flowsheets.        Anesthesia Post Evaluation    Patient location during evaluation: PACU  Patient participation: complete - patient participated  Level of consciousness: awake and alert  Airway patency: patent  Nausea & Vomiting: no nausea and no vomiting  Complications: no  Cardiovascular status: hemodynamically stable  Respiratory status: nasal cannula and spontaneous ventilation  Hydration status: euvolemic

## 2021-02-12 NOTE — OP NOTE
Lumbar Facet Nerve Block Injection:  Surgeon: Minna De Luna     PRE-OP DIAGNOSIS: M47.817 (lumbosacral spondylosis), M54.5 (low back pain)    POST-OP DIAGNOSIS: Same. PROCEDURE PERFORMED: Lumbar Facet Nerve Block Multiple Levels  Bilateral L4 - 5 and L5 - S1. Physician confirmed and marked the surgical site. EBL: minimal      CONSENT: Patient has undergone the educational process with this procedure, is aware and fully understands the risks involved: potential damage to any and all body organs including possible bleeding, infection and nerve injury, allergic reaction and headache. Patient also understands that the procedure will be undertaken in a safe, controlled, and monitored setting. Patient recognizes that the benefits include relief from pain and reduction in the oral use of medications. Patient agreed to proceed. The patient was counseled at length about the risks of colette Covid-19 during their perioperative period and any recovery window from their procedure. The patient was made aware that colette Covid-19  may worsen their prognosis for recovering from their procedure  and lend to a higher morbidity and/or mortality risk. All material risks, benefits, and reasonable alternatives including postponing the procedure were discussed. The patient does wish to proceed with the procedure at this time. PREP: Timeout was performed prior to starting the procedure. The patient's back was prepped with chloroprep and draped appropriately. 5ml of 0.5% lidocaine was used to anesthetize the skin and subcutaneous tissue.

## 2021-02-12 NOTE — ANESTHESIA PRE PROCEDURE
Department of Anesthesiology  Preprocedure Note       Name:  Clay Lloyd   Age:  31 y.o.  :  1990                                          MRN:  [id number]         Date:  2021      Surgeon: Anuradha Carrillo):  Bubba Mcmahan MD    Procedure: Procedure(s):  NERVE BLOCK BILATERAL -MBB #1 L4-5, L5-S1    Medications prior to admission:   Prior to Admission medications    Medication Sig Start Date End Date Taking? Authorizing Provider   gabapentin (NEURONTIN) 300 MG capsule Take 1 capsule by mouth 3 times daily for 30 days. 20 Yes Domonique Pearce MD   DULoxetine (CYMBALTA) 30 MG extended release capsule Take 1 capsule by mouth daily 20  Yes Domonique Pearce MD   albuterol sulfate HFA (PROAIR HFA) 108 (90 Base) MCG/ACT inhaler Inhale 2 puffs into the lungs every 6 hours as needed for Wheezing 20  Yes Domonique Pearce MD   meloxicam [facility]) 15 MG tablet Take 1 tablet by mouth daily 20  Domonique Pearce MD       Current medications:    No current facility-administered medications for this encounter. Allergies:     Allergies   Allergen Reactions   Omelia Or [Tofacitinib] Anaphylaxis and Other (See Comments)     gastritis    Ceclor [Cefaclor] Hives    Cephalosporins      hives       Problem List:    Patient Active Problem List   Diagnosis Code    Juvenile rheumatoid arthritis ([facility] M08.00    Post traumatic stress disorder (PTSD) F43.10    History of sepsis Z86.19    Hyperglycemia R73.9    Chronic bilateral low back pain with sciatica M54.40, G89.29    Lumbar disc disease M51.9    Chronic nonintractable headache R51.9, G89.29    Depression F32.9    Anxiety F41.9       Past Medical History:        Diagnosis Date    Chronic sinus infection     Cluster headache     History of sepsis 2016    post C-Birth/partial    HSP (Henoch Schonlein purpura) (HCC)     [facility] ([facility]     Dr. Kit Griffin Post traumatic stress disorder (PTSD)  Uveitis 1991       Past Surgical History:        Procedure Laterality Date     SECTION  2016    CHOLECYSTECTOMY      EYE SURGERY Left     cataract with lens implant    HYSTERECTOMY  2016    partial    KNEE ARTHROSCOPY Right     X's 3 for meniscal tears    [place] ERCP DX COLLECTION SPECIMEN BRUSHING/WASHING N/A 3/14/2018    ERCP ENDOSCOPIC RETROGRADE CHOLANGIOPANCREATOGRAPHY WITH BALLOON SWEEP performed by Abigail Medrano MD at [address] N/A 3/16/2018    LAPAROSCOPIC ROBOTIC ASSISTED MULTIPORT CHOLECYSTECTOMY performed by Kade Palacios MD at [address]         Social History:    Social History     Tobacco Use    Smoking status: Former Smoker     Packs/day: 0.25     Types: Cigarettes     Start date: 5/15/2014     Quit date: 3/5/2018     Years since quittin.9    Smokeless tobacco: Never Used   Substance Use Topics    Alcohol use: Yes     Comment: socially                                Counseling given: Not Answered      Vital Signs (Current): There were no vitals filed for this visit.                                            BP Readings from Last 3 Encounters:   20 116/78   20 128/88   18 114/66       NPO Status: Time of last liquid consumption:                         Time of last solid consumption:                         Date of last liquid consumption: 21                        Date of last solid food consumption: 21    BMI:   Wt Readings from Last 3 Encounters:   21 180 lb (81.6 kg)   20 203 lb (92.1 kg)   20 200 lb (90.7 kg)     There is no height or weight on file to calculate BMI.    CBC:   Lab Results   Component Value Date    WBC 7.6 10/13/2020    RBC 4.92 10/13/2020    RBC 4.42 10/29/2011    HGB 14.0 10/13/2020    HCT 43.1 10/13/2020    MCV 87.6 10/13/2020    RDW 12.8 10/13/2020     10/13/2020     10/29/2011       CMP:   Lab Results Anesthetic plan and risks discussed with patient. Plan discussed with CRNA.                   Javier Epstein MD   2/12/2021

## 2021-02-12 NOTE — H&P
Pain Pre-Op H&P Note    Kendynickolas Souza Flakitaghislaine    HPI: Silvio Barron  presents with back pain    Past Medical History:   Diagnosis Date    Chronic sinus infection     Cluster headache     History of sepsis 2016    post C-Birth/partial    HSP (Henoch Schonlein purpura) (HCC)     Juvenile rheumatoid arthritis ([facility] [address]     Dr. Umer Fregoso traumatic stress disorder (PTSD)     Uveitis        Past Surgical History:   Procedure Laterality Date     SECTION  2016    CHOLECYSTECTOMY      EYE SURGERY Left     cataract with lens implant    HYSTERECTOMY  2016    partial    KNEE ARTHROSCOPY Right     X's 3 for meniscal tears    [place] ERCP DX COLLECTION SPECIMEN BRUSHING/WASHING N/A 3/14/2018    ERCP ENDOSCOPIC RETROGRADE CHOLANGIOPANCREATOGRAPHY WITH BALLOON SWEEP performed by Eli Florez MD at [address] N/A 3/16/2018    LAPAROSCOPIC ROBOTIC ASSISTED MULTIPORT CHOLECYSTECTOMY performed by Lashell Pritchard MD at [address]         Family History   Problem Relation Age of Onset    High Blood Pressure Mother     Breast Cancer Maternal Aunt     Diabetes Maternal Grandmother     Stroke Maternal Grandfather     Prostate Cancer Paternal Grandfather        Allergies   Allergen Reactions   Patricia Adri [Tofacitinib] Anaphylaxis and Other (See Comments)     gastritis    Ceclor [Cefaclor] Hives    Cephalosporins      hives         Current Facility-Administered Medications:     meperidine (DEMEROL) injection 12.5 mg, 12.5 mg, Intravenous, Q5 Min PRN, Lizeth Lugo MD    morphine (PF) injection 1 mg, 1 mg, Intravenous, Q5 Min PRN, Lizeth Lugo MD    HYDROmorphone (DILAUDID) injection 0.5 mg, 0.5 mg, Intravenous, Q5 Min PRN, Lizeth Lugo MD    fentaNYL (SUBLIMAZE) injection 25 mcg, 25 mcg, Intravenous, Q5 Min PRN, Lizeth Lugo MD   HYDROcodone-acetaminophen (NORCO) 5-325 MG per tablet 1 tablet, 1 tablet, Oral, PRN **OR** HYDROcodone-acetaminophen (NORCO) 5-325 MG per tablet 2 tablet, 2 tablet, Oral, PRN, Jair Jeffers MD    ondansetron [facility]) injection 4 mg, 4 mg, Intravenous, Once PRN, Jair Jeffers MD    promethazine [facility]) injection 6.25 mg, 6.25 mg, Intramuscular, Once PRN, Jair Jeffers MD    diphenhydrAMINE (BENADRYL) injection 12.5 mg, 12.5 mg, Intravenous, Once PRN, Jair Jeffers MD    hydrALAZINE (APRESOLINE) injection 5 mg, 5 mg, Intravenous, Q10 Min PRN, Jair Jeffers MD    Social History     Tobacco Use    Smoking status: Former Smoker     Packs/day: 0.25     Types: Cigarettes     Start date: 5/15/2014     Quit date: 3/5/2018     Years since quittin.9    Smokeless tobacco: Never Used   Substance Use Topics    Alcohol use: Yes     Comment: socially       Review of Systems:   Focused review of systems was performed, and negative as pertinent to diagnosis, except as stated in HPI. Physical Exam  Constitutional:       Appearance: Normal appearance. Pulmonary:      Effort: Pulmonary effort is normal.   Neurological:      Mental Status: He is alert. Psychiatric:         Attention and Perception: Attention and perception normal.         Mood and Affect: Mood and affect normal.         Patient's current physical status, medications, medical history, and HPI have been reviewed and updated as appropriate on this date: 21    Risk/Benefit(s): The risks, benefits, alternatives, and potential complications have been discussed with the patient/family and informed consent has been obtained for the procedure/sedation.     Diagnosis:   M47.817 SPONDYLOSIS WITHOUT MYELOPATHY      Plan: lumbar mbb        [address] [facility]

## 2021-02-18 ENCOUNTER — VIRTUAL VISIT (OUTPATIENT)
Dept: PAIN MANAGEMENT | Age: 31
End: 2021-02-18
Payer: COMMERCIAL

## 2021-02-18 DIAGNOSIS — G89.29 OTHER CHRONIC PAIN: ICD-10-CM

## 2021-02-18 DIAGNOSIS — M47.817 LUMBOSACRAL SPONDYLOSIS WITHOUT MYELOPATHY: Primary | ICD-10-CM

## 2021-02-18 DIAGNOSIS — G89.29 CHRONIC BILATERAL LOW BACK PAIN, UNSPECIFIED WHETHER SCIATICA PRESENT: ICD-10-CM

## 2021-02-18 DIAGNOSIS — M54.50 CHRONIC BILATERAL LOW BACK PAIN, UNSPECIFIED WHETHER SCIATICA PRESENT: ICD-10-CM

## 2021-02-18 DIAGNOSIS — M46.1 SACROILIITIS (HCC): ICD-10-CM

## 2021-02-18 PROCEDURE — G8428 CUR MEDS NOT DOCUMENT: HCPCS | Performed by: PAIN MEDICINE

## 2021-02-18 PROCEDURE — 99213 OFFICE O/P EST LOW 20 MIN: CPT | Performed by: PAIN MEDICINE

## 2021-02-18 ASSESSMENT — ENCOUNTER SYMPTOMS
COUGH: 0
BACK PAIN: 1
BOWEL INCONTINENCE: 0

## 2021-02-18 NOTE — PROGRESS NOTES
HPI:     Back Pain  This is a chronic problem. The current episode started more than 1 year ago. The problem occurs constantly. The problem has been gradually worsening since onset. The pain is present in the lumbar spine. The quality of the pain is described as aching. The pain is moderate. The pain is the same all the time. The symptoms are aggravated by position. Pertinent negatives include no bowel incontinence, chest pain or fever. MRI lumbar spine with degenerative changes and mild disc bulging. Recent diagnostic lumbar medial branch block. She has had epidurals without benefit in the past.    Patient denies any new neurological symptoms. Nobowel or bladder incontinence, no weakness, and no falling. Review of OARRS does not show any aberrant prescription behavior.      Past Medical History:   Diagnosis Date    Chronic sinus infection     Cluster headache     History of sepsis 2016    post C-Birth/partial    HSP (Henoch Schonlein purpura) (HCC)     Juvenile rheumatoid arthritis ([facility] [address]     Dr. Bhagat Gamma traumatic stress disorder (PTSD)     Uveitis        Past Surgical History:   Procedure Laterality Date     SECTION  2016    CHOLECYSTECTOMY      EYE SURGERY Left     cataract with lens implant    HYSTERECTOMY  2016    partial    KNEE ARTHROSCOPY Right     X's 3 for meniscal tears    PAIN MANAGEMENT PROCEDURE Bilateral 2021    NERVE BLOCK BILATERAL -MBB #1 L4-5, L5-S1 - Bilateral    PAIN MANAGEMENT PROCEDURE Bilateral 2021    NERVE BLOCK BILATERAL -MBB #1 L4-5, L5-S1 performed by Eloise Ricks MD at [address] [facility] ERCP [address] BRUSHING/WASHING N/A 3/14/2018    ERCP ENDOSCOPIC RETROGRADE CHOLANGIOPANCREATOGRAPHY WITH BALLOON SWEEP performed by Kailey Lopez MD at [place] [address] 3/16/2018    LAPAROSCOPIC ROBOTIC ASSISTED MULTIPORT CHOLECYSTECTOMY performed by Maria G Ivy MD at [address]   TONSILLECTOMY AND ADENOIDECTOMY         Allergies   Allergen Reactions   Anisa Moles [Tofacitinib] Anaphylaxis and Other (See Comments)     gastritis    Ceclor [Cefaclor] Hives    Cephalosporins      hives         Current Outpatient Medications:     gabapentin (NEURONTIN) 300 MG capsule, Take 1 capsule by mouth 3 times daily for 30 days. , Disp: 90 capsule, Rfl: 1    meloxicam (MOBIC) 15 MG tablet, Take 1 tablet by mouth daily, Disp: 30 tablet, Rfl: 3    DULoxetine (CYMBALTA) 30 MG extended release capsule, Take 1 capsule by mouth daily, Disp: 30 capsule, Rfl: 3    albuterol sulfate HFA (PROAIR HFA) 108 (90 Base) MCG/ACT inhaler, Inhale 2 puffs into the lungs every 6 hours as needed for Wheezing, Disp: 3 Inhaler, Rfl: 3    Family History   Problem Relation Age of Onset    High Blood Pressure Mother     Breast Cancer Maternal Aunt     Diabetes Maternal Grandmother     Stroke Maternal Grandfather     Prostate Cancer Paternal Grandfather        Social History     Socioeconomic History    Marital status:      Spouse name: Not on file    Number of children: Not on file    Years of education: Not on file    Highest education level: Not on file   Occupational History    Not on file   Social Needs    Financial resource strain: Not on file    Food insecurity     Worry: Not on file     Inability: Not on file    Transportation needs     Medical: Not on file     Non-medical: Not on file   Tobacco Use    Smoking status: Former Smoker     Packs/day: 0.25     Types: Cigarettes     Start date: 5/15/2014     Quit date: 3/5/2018     Years since quittin.9    Smokeless tobacco: Never Used   Substance and Sexual Activity    Alcohol use: Yes     Comment: socially    Drug use: Yes     Types: Marijuana     Comment: for symptom relief    Sexual activity: Not on file   Lifestyle    Physical activity     Days per week: Not on file     Minutes per session: Not on file    Stress: Not on file Relationships    Social connections     Talks on phone: Not on file     Gets together: Not on file     Attends Zoroastrianism service: Not on file     Active member of club or organization: Not on file     Attends meetings of clubs or organizations: Not on file     Relationship status: Not on file    Intimate partner violence     Fear of current or ex partner: Not on file     Emotionally abused: Not on file     Physically abused: Not on file     Forced sexual activity: Not on file   Other Topics Concern    Not on file   Social History Narrative    Not on file       Review of Systems:  Review of Systems   Constitution: Negative for fever. Cardiovascular: Negative for chest pain. Respiratory: Negative for cough. Musculoskeletal: Positive for back pain. Gastrointestinal: Negative for bowel incontinence. Physical Exam:    Physical Exam  Constitutional:       Appearance: Normal appearance. Pulmonary:      Effort: Pulmonary effort is normal.   Neurological:      Mental Status: She is alert. Psychiatric:         Attention and Perception: Attention and perception normal.         Mood and Affect: Mood and affect normal.         Record/Diagnostics Review:    As above, I did review the imaging    Orders:  Orders Placed This Encounter   Procedures    HI INJECT SI JOINT ARTHRGRPHY&/ANES/STEROID W/IMAGE       Assessment:  1. Lumbosacral spondylosis without myelopathy    2. Other chronic pain    3. Chronic bilateral low back pain, unspecified whether sciatica present    4. Sacroiliitis [facility])        Treatment Plan:  DISCUSSION: Treatment options discussed with patient and all questions answered to patient's satisfaction. OARRS Review: Reviewed and acceptable for medications prescribed. TREATMENT OPTIONS:     Continue physical therapy exercise program.  May benefit from [facility] joint injections both diagnostic and therapeutic purposes. She has had epidural steroid in the past not benefit,  may consider left-sided transforaminal in the future however at this point hold off on epidurals. Declined surgical evaluation. She also plans to see a new rheumatologist.      Shanika Yates M.D. I have reviewed the chief complaint and history of present illness (including ROS and PFSH) and vital documentation by my staff and I agree with their documentation and have added where applicable. Jacqueline López is a 31 y.o. female being evaluated by a Virtual Visit (video visit) encounter to address concerns as mentioned above. A caregiver was present when appropriate. Due to this being a TeleHealth encounter (During [place] public health emergency), evaluation of the following organ systems was limited: Vitals/Constitutional/EENT/Resp/CV/GI//MS/Neuro/Skin/Heme-Lymph-Imm. Pursuant to the emergency declaration under the [address] [place] [address] authority and the [employer] Act, this Virtual Visit was conducted with patient's (and/or legal guardian's) consent, to reduce the patient's risk of exposure to COVID-19 and provide necessary medical care. The patient (and/or legal guardian) has also been advised to contact this office for worsening conditions or problems, and seek emergency medical treatment and/or call 911 if deemed necessary. Patient identification was verified at the start of the visit: Yes    Total time spent for this encounter: Not billed by time    Services were provided through a video synchronous discussion virtually to substitute for in-person clinic visit. Patient and provider were located at their individual homes. --Nahed Monge MD on 2/18/2021 at 4:59 PM    An electronic signature was used to authenticate this note.

## 2021-03-01 ENCOUNTER — HOSPITAL ENCOUNTER (OUTPATIENT)
Dept: LAB | Age: 31
Setting detail: SPECIMEN
Discharge: HOME | End: 2021-03-01
Payer: COMMERCIAL

## 2021-03-01 DIAGNOSIS — Z01.818 PREOP TESTING: Primary | ICD-10-CM

## 2021-03-01 PROCEDURE — U0005 INFEC AGEN DETEC AMPLI PROBE: HCPCS

## 2021-03-01 PROCEDURE — U0003 INFECTIOUS AGENT DETECTION BY NUCLEIC ACID (DNA OR RNA); SEVERE ACUTE RESPIRATORY SYNDROME CORONAVIRUS 2 (SARS-COV-2) (CORONAVIRUS DISEASE [COVID-19]), AMPLIFIED PROBE TECHNIQUE, MAKING USE OF HIGH THROUGHPUT TECHNOLOGIES AS DESCRIBED BY CMS-2020-01-R: HCPCS

## 2021-03-02 ENCOUNTER — ANESTHESIA EVENT (OUTPATIENT)
Dept: OPERATING ROOM | Age: 31
End: 2021-03-02
Payer: COMMERCIAL

## 2021-03-02 LAB
SARS-COV-2: NORMAL
SARS-COV-2: NOT DETECTED
SOURCE: NORMAL

## 2021-03-05 ENCOUNTER — ANESTHESIA (OUTPATIENT)
Dept: OPERATING ROOM | Age: 31
End: 2021-03-05
Payer: COMMERCIAL

## 2021-03-05 ENCOUNTER — HOSPITAL ENCOUNTER (OUTPATIENT)
Age: 31
Setting detail: OUTPATIENT SURGERY
Discharge: HOME | End: 2021-03-05
Attending: PAIN MEDICINE | Admitting: PAIN MEDICINE
Payer: COMMERCIAL

## 2021-03-05 ENCOUNTER — APPOINTMENT (OUTPATIENT)
Dept: GENERAL RADIOLOGY | Age: 31
End: 2021-03-05
Attending: PAIN MEDICINE
Payer: COMMERCIAL

## 2021-03-05 VITALS
OXYGEN SATURATION: 100 % | SYSTOLIC BLOOD PRESSURE: 161 MMHG | RESPIRATION RATE: 12 BRPM | DIASTOLIC BLOOD PRESSURE: 94 MMHG

## 2021-03-05 VITALS
BODY MASS INDEX: 36.71 KG/M2 | WEIGHT: 199.5 LBS | RESPIRATION RATE: 23 BRPM | HEART RATE: 87 BPM | DIASTOLIC BLOOD PRESSURE: 87 MMHG | TEMPERATURE: 97.7 F | HEIGHT: 62 IN | SYSTOLIC BLOOD PRESSURE: 122 MMHG | OXYGEN SATURATION: 97 %

## 2021-03-05 PROCEDURE — 27096 INJECT SACROILIAC JOINT: CPT | Performed by: PAIN MEDICINE

## 2021-03-05 PROCEDURE — 3700000000 HC ANESTHESIA ATTENDED CARE: Performed by: PAIN MEDICINE

## 2021-03-05 PROCEDURE — 6360000002 HC RX W HCPCS: Performed by: PAIN MEDICINE

## 2021-03-05 PROCEDURE — 6360000002 HC RX W HCPCS: Performed by: ANESTHESIOLOGY

## 2021-03-05 PROCEDURE — 7100000010 HC PHASE II RECOVERY - FIRST 15 MIN: Performed by: PAIN MEDICINE

## 2021-03-05 PROCEDURE — 7100000011 HC PHASE II RECOVERY - ADDTL 15 MIN: Performed by: PAIN MEDICINE

## 2021-03-05 PROCEDURE — 3600000002 HC SURGERY LEVEL 2 BASE: Performed by: PAIN MEDICINE

## 2021-03-05 PROCEDURE — 3209999900 FLUORO FOR SURGICAL PROCEDURES

## 2021-03-05 PROCEDURE — 2709999900 HC NON-CHARGEABLE SUPPLY: Performed by: PAIN MEDICINE

## 2021-03-05 PROCEDURE — 2500000003 HC RX 250 WO HCPCS: Performed by: PAIN MEDICINE

## 2021-03-05 RX ORDER — MORPHINE SULFATE 2 MG/ML
2 INJECTION, SOLUTION INTRAMUSCULAR; INTRAVENOUS EVERY 5 MIN PRN
Status: DISCONTINUED | OUTPATIENT
Start: 2021-03-05 | End: 2021-03-05 | Stop reason: HOSPADM

## 2021-03-05 RX ORDER — BUPIVACAINE HYDROCHLORIDE 2.5 MG/ML
INJECTION, SOLUTION INFILTRATION; PERINEURAL PRN
Status: DISCONTINUED | OUTPATIENT
Start: 2021-03-05 | End: 2021-03-05 | Stop reason: ALTCHOICE

## 2021-03-05 RX ORDER — MEPERIDINE HYDROCHLORIDE 50 MG/ML
12.5 INJECTION INTRAMUSCULAR; INTRAVENOUS; SUBCUTANEOUS EVERY 5 MIN PRN
Status: DISCONTINUED | OUTPATIENT
Start: 2021-03-05 | End: 2021-03-05 | Stop reason: HOSPADM

## 2021-03-05 RX ORDER — FENTANYL CITRATE 50 UG/ML
INJECTION, SOLUTION INTRAMUSCULAR; INTRAVENOUS PRN
Status: DISCONTINUED | OUTPATIENT
Start: 2021-03-05 | End: 2021-03-05 | Stop reason: SDUPTHER

## 2021-03-05 RX ORDER — OXYCODONE HYDROCHLORIDE AND ACETAMINOPHEN 5; 325 MG/1; MG/1
1 TABLET ORAL PRN
Status: DISCONTINUED | OUTPATIENT
Start: 2021-03-05 | End: 2021-03-05 | Stop reason: HOSPADM

## 2021-03-05 RX ORDER — LABETALOL 20 MG/4 ML (5 MG/ML) INTRAVENOUS SYRINGE
5 EVERY 10 MIN PRN
Status: DISCONTINUED | OUTPATIENT
Start: 2021-03-05 | End: 2021-03-05 | Stop reason: HOSPADM

## 2021-03-05 RX ORDER — DIPHENHYDRAMINE HYDROCHLORIDE 50 MG/ML
12.5 INJECTION INTRAMUSCULAR; INTRAVENOUS
Status: DISCONTINUED | OUTPATIENT
Start: 2021-03-05 | End: 2021-03-05 | Stop reason: HOSPADM

## 2021-03-05 RX ORDER — UBIDECARENONE 75 MG
50 CAPSULE ORAL DAILY
COMMUNITY

## 2021-03-05 RX ORDER — PROMETHAZINE HYDROCHLORIDE 25 MG/ML
12.5 INJECTION, SOLUTION INTRAMUSCULAR; INTRAVENOUS
Status: DISCONTINUED | OUTPATIENT
Start: 2021-03-05 | End: 2021-03-05 | Stop reason: HOSPADM

## 2021-03-05 RX ORDER — OXYCODONE HYDROCHLORIDE AND ACETAMINOPHEN 5; 325 MG/1; MG/1
2 TABLET ORAL PRN
Status: DISCONTINUED | OUTPATIENT
Start: 2021-03-05 | End: 2021-03-05 | Stop reason: HOSPADM

## 2021-03-05 RX ORDER — DEXAMETHASONE SODIUM PHOSPHATE 10 MG/ML
INJECTION INTRAMUSCULAR; INTRAVENOUS PRN
Status: DISCONTINUED | OUTPATIENT
Start: 2021-03-05 | End: 2021-03-05 | Stop reason: ALTCHOICE

## 2021-03-05 RX ORDER — 0.9 % SODIUM CHLORIDE 0.9 %
500 INTRAVENOUS SOLUTION INTRAVENOUS
Status: DISCONTINUED | OUTPATIENT
Start: 2021-03-05 | End: 2021-03-05 | Stop reason: HOSPADM

## 2021-03-05 RX ORDER — ONDANSETRON 2 MG/ML
4 INJECTION INTRAMUSCULAR; INTRAVENOUS
Status: DISCONTINUED | OUTPATIENT
Start: 2021-03-05 | End: 2021-03-05 | Stop reason: HOSPADM

## 2021-03-05 RX ORDER — MIDAZOLAM HYDROCHLORIDE 1 MG/ML
INJECTION INTRAMUSCULAR; INTRAVENOUS PRN
Status: DISCONTINUED | OUTPATIENT
Start: 2021-03-05 | End: 2021-03-05 | Stop reason: SDUPTHER

## 2021-03-05 RX ORDER — SODIUM CHLORIDE 0.9 % (FLUSH) 0.9 %
10 SYRINGE (ML) INJECTION EVERY 12 HOURS SCHEDULED
Status: DISCONTINUED | OUTPATIENT
Start: 2021-03-05 | End: 2021-03-05 | Stop reason: HOSPADM

## 2021-03-05 RX ORDER — SODIUM CHLORIDE 0.9 % (FLUSH) 0.9 %
10 SYRINGE (ML) INJECTION PRN
Status: DISCONTINUED | OUTPATIENT
Start: 2021-03-05 | End: 2021-03-05 | Stop reason: HOSPADM

## 2021-03-05 RX ADMIN — FENTANYL CITRATE 100 MCG: 50 INJECTION, SOLUTION INTRAMUSCULAR; INTRAVENOUS at 11:49

## 2021-03-05 RX ADMIN — MIDAZOLAM HYDROCHLORIDE 2 MG: 1 INJECTION, SOLUTION INTRAMUSCULAR; INTRAVENOUS at 11:49

## 2021-03-05 ASSESSMENT — PULMONARY FUNCTION TESTS
PIF_VALUE: 0

## 2021-03-05 ASSESSMENT — PAIN SCALES - GENERAL: PAINLEVEL_OUTOF10: 0

## 2021-03-05 ASSESSMENT — PAIN - FUNCTIONAL ASSESSMENT: PAIN_FUNCTIONAL_ASSESSMENT: 0-10

## 2021-03-05 ASSESSMENT — PAIN DESCRIPTION - DESCRIPTORS: DESCRIPTORS: ACHING;BURNING

## 2021-03-05 NOTE — ANESTHESIA PRE PROCEDURE
Department of Anesthesiology  Preprocedure Note       Name:  Lucien Lau   Age:  31 y.o.  :  1990                                          MRN:  [id number]         Date:  3/5/2021      Surgeon: Mukund Mccurdy):  Irene Wall MD    Procedure: Procedure(s):  SACROILIAC JOINT INJECTION    Medications prior to admission:   Prior to Admission medications    Medication Sig Start Date End Date Taking? Authorizing Provider   Multiple Vitamin (MULTI-VITAMIN DAILY PO) Take by mouth   Yes Historical Provider, MD   vitamin B-12 (CYANOCOBALAMIN) 100 MCG tablet Take 50 mcg by mouth daily   Yes Historical Provider, MD   gabapentin (NEURONTIN) 300 MG capsule Take 1 capsule by mouth 3 times daily for 30 days. 12/4/20 3/5/21 Yes Sp Acosta MD   meloxicam (MOBIC) 15 MG tablet Take 1 tablet by mouth daily 12/4/20 3/5/21 Yes Sp Acosta MD   DULoxetine (CYMBALTA) 30 MG extended release capsule Take 1 capsule by mouth daily 20  Yes Sp Acosta MD   albuterol sulfate HFA (PROAIR HFA) 108 (90 Base) MCG/ACT inhaler Inhale 2 puffs into the lungs every 6 hours as needed for Wheezing 20   Sp Acosta MD       Current medications:    Current Facility-Administered Medications   Medication Dose Route Frequency Provider Last Rate Last Admin    sodium chloride flush 0.9 % injection 10 mL  10 mL Intravenous 2 times per day Bryce Matthew MD        sodium chloride flush 0.9 % injection 10 mL  10 mL Intravenous PRN Bryce Matthew MD           Allergies:     Allergies   Allergen Reactions   Inga Folds [Tofacitinib] Anaphylaxis and Other (See Comments)     gastritis    Ceclor [Cefaclor] Hives    Cephalosporins      hives       Problem List:    Patient Active Problem List   Diagnosis Code    Juvenile rheumatoid arthritis ([facility] [address] M08.00    Post traumatic stress disorder (PTSD) F43.10    History of sepsis Z86.19    Hyperglycemia R73.9    Chronic bilateral low back pain with sciatica M54.40, G89.29  Lumbar disc disease M51.9    Chronic nonintractable headache R51.9, G89.29    Depression F32.9    Anxiety F41.9       Past Medical History:        Diagnosis Date    Chronic sinus infection     Cluster headache     History of sepsis 2016    post C-Birth/partial    HSP (Henoch Schonlein purpura) (HCC)     Juvenile rheumatoid arthritis ([facility] [address]     Dr. Barry Gamino traumatic stress disorder (PTSD)     Uveitis        Past Surgical History:        Procedure Laterality Date     SECTION  2016    CHOLECYSTECTOMY      EYE SURGERY Left     cataract with lens implant    HYSTERECTOMY  2016    partial    KNEE ARTHROSCOPY Right     X's 3 for meniscal tears    PAIN MANAGEMENT PROCEDURE Bilateral 2021    NERVE BLOCK BILATERAL -MBB #1 L4-5, L5-S1 - Bilateral    PAIN MANAGEMENT PROCEDURE Bilateral 2021    NERVE BLOCK BILATERAL -MBB #1 L4-5, L5-S1 performed by Nahed Monge MD at [address] [facility] ERCP [address] BRUSHING/WASHING N/A 3/14/2018    ERCP ENDOSCOPIC RETROGRADE CHOLANGIOPANCREATOGRAPHY WITH BALLOON SWEEP performed by Flavio Jacob MD at [address] [facility] N/A 3/16/2018    LAPAROSCOPIC ROBOTIC ASSISTED MULTIPORT CHOLECYSTECTOMY performed by Bharit Corona MD at [address] [place] [address]         Social History:    Social History     Tobacco Use    Smoking status: Former Smoker     Packs/day: 0.25     Types: Cigarettes     Start date: 5/15/2014     Quit date: 3/5/2018     Years since quitting: 3.0    Smokeless tobacco: Never Used   Substance Use Topics    Alcohol use: Yes     Comment: socially                                Counseling given: Not Answered      Vital Signs (Current):   Vitals:    21 1121   BP: (!) 151/90   Pulse: 98   Resp: 15   Temp: 98.2 °F (36.8 °C)   TempSrc: Temporal   SpO2: 100%   Weight: 199 lb 8 oz (90.5 kg)   Height: 5' 2\" (1.575 m) BP Readings from Last 3 Encounters:   03/05/21 (!) 151/90   02/12/21 (!) 141/89   02/12/21 119/74       NPO Status: Time of last liquid consumption: 2000                        Time of last solid consumption: 2000                        Date of last liquid consumption: 03/04/21                        Date of last solid food consumption: 03/04/21    BMI:   Wt Readings from Last 3 Encounters:   03/05/21 199 lb 8 oz (90.5 kg)   02/12/21 200 lb 4 oz (90.8 kg)   01/28/21 180 lb (81.6 kg)     Body mass index is 36.49 kg/m². CBC:   Lab Results   Component Value Date    WBC 7.6 10/13/2020    RBC 4.92 10/13/2020    RBC 4.42 10/29/2011    HGB 14.0 10/13/2020    HCT 43.1 10/13/2020    MCV 87.6 10/13/2020    RDW 12.8 10/13/2020     10/13/2020     10/29/2011       CMP:   Lab Results   Component Value Date     10/13/2020    K 3.8 10/13/2020     10/13/2020    CO2 25 10/13/2020    BUN 10 10/13/2020    CREATININE 0.88 10/13/2020    GFRAA >60 10/13/2020    LABGLOM >60 10/13/2020    GLUCOSE 94 10/13/2020    GLUCOSE 86 10/29/2011    PROT 7.1 10/13/2020    CALCIUM 9.4 10/13/2020    BILITOT 0.46 10/13/2020    ALKPHOS 113 10/13/2020    AST 18 10/13/2020    ALT 19 10/13/2020       POC Tests: No results for input(s): POCGLU, POCNA, POCK, POCCL, POCBUN, POCHEMO, POCHCT in the last 72 hours.     Coags: No results found for: PROTIME, INR, APTT    HCG (If Applicable):   Lab Results   Component Value Date    PREGTESTUR NEGATIVE 02/22/2018        ABGs: No results found for: PHART, PO2ART, MEX2FTJ, IGK0TWS, BEART, G1WOXUIZ     Type & Screen (If Applicable):  No results found for: LABABO, LABRH    Drug/Infectious Status (If Applicable):  Lab Results   Component Value Date    HEPCAB NONREACTIVE 03/13/2018       COVID-19 Screening (If Applicable):   Lab Results   Component Value Date    COVID19 Not Detected 03/01/2021         Anesthesia Evaluation Patient summary reviewed and Nursing notes reviewed  Airway: Mallampati: II  TM distance: >3 FB   Neck ROM: full  Mouth opening: > = 3 FB Dental: normal exam         Pulmonary:normal exam    (+) COPD:  asthma:                           ROS comment: -QUIT SMOKING 2020 - 12 PACK YEARS  -ASTHMA WELL CONTROLLED   Cardiovascular:Negative CV ROS                      Neuro/Psych:                ROS comment: -WEAKNESS LEFT LEG  -LEFT SCIATICA  -DDD GI/Hepatic/Renal:   (+) morbid obesity          Endo/Other:    (+) : arthritis: rheumatoid. , .                  ROS comment: -MARIJUANA USE  -NPO AFTER MIDNIGHT  -ALLERGIES - XELJANZ, CECLOR, CEPHALOSPORINS Abdominal:           Vascular: negative vascular ROS. Anesthesia Plan      MAC     ASA 2       Induction: intravenous. MIPS: Postoperative opioids intended and Prophylactic antiemetics administered. Anesthetic plan and risks discussed with patient. Plan discussed with CRNA.     Attending anesthesiologist reviewed and agrees with Pre Eval content              Esthela Mcghee MD   3/5/2021

## 2021-03-05 NOTE — ANESTHESIA POSTPROCEDURE EVALUATION
Department of Anesthesiology  Postprocedure Note    Patient: Karis Akbar  MRN: [id number]  YOB: 1990  Date of evaluation: 3/5/2021  Time:  12:19 PM     Procedure Summary     Date: 03/05/21 Room / Location[facility] [address] [place], [place] [address] [address] [facility]    Anesthesia Start: 0471 Anesthesia Stop: 1158    Procedure: SACROILIAC JOINT INJECTION (Bilateral ) Diagnosis: (M46.1 RADICUOPATHY)    Surgeons: Masha Beaver MD Responsible Provider: Gaynel Nyhan, MD    Anesthesia Type: MAC ASA Status: 2          Anesthesia Type: MAC    Roro Phase I: Roro Score: 10    Roro Phase II: Roro Score: 10    Last vitals: Reviewed and per EMR flowsheets.        Anesthesia Post Evaluation    Patient location during evaluation: PACU  Patient participation: complete - patient participated  Level of consciousness: awake and alert  Airway patency: patent  Nausea & Vomiting: no nausea and no vomiting  Complications: no  Cardiovascular status: hemodynamically stable  Respiratory status: nasal cannula and spontaneous ventilation  Hydration status: euvolemic

## 2021-03-05 NOTE — H&P
Pain Pre-Op H&P Note    Ernie Mauro De Luna    HPI: Gilmer Dupont  presents with back pain.     Past Medical History:   Diagnosis Date    Chronic sinus infection     Cluster headache     History of sepsis 2016    post C-Birth/partial    HSP (Henoch Schonlein purpura) (HCC)     Juvenile rheumatoid arthritis ([facility] [address]     Dr. Travis Elise traumatic stress disorder (PTSD)     Uveitis        Past Surgical History:   Procedure Laterality Date     SECTION  2016    CHOLECYSTECTOMY      EYE SURGERY Left     cataract with lens implant    HYSTERECTOMY  2016    partial    KNEE ARTHROSCOPY Right     X's 3 for meniscal tears    PAIN MANAGEMENT PROCEDURE Bilateral 2021    NERVE BLOCK BILATERAL -MBB #1 L4-5, L5-S1 - Bilateral    PAIN MANAGEMENT PROCEDURE Bilateral 2021    NERVE BLOCK BILATERAL -MBB #1 L4-5, L5-S1 performed by Tonja Franks MD at [address] [facility] ERCP [address] BRUSHING/WASHING N/A 3/14/2018    ERCP ENDOSCOPIC RETROGRADE CHOLANGIOPANCREATOGRAPHY WITH BALLOON SWEEP performed by Ac Blair MD at [address] N/A 3/16/2018    LAPAROSCOPIC ROBOTIC ASSISTED MULTIPORT CHOLECYSTECTOMY performed by Julissa Morales MD at [facility]         Family History   Problem Relation Age of Onset    High Blood Pressure Mother     Breast Cancer Maternal Aunt     Diabetes Maternal Grandmother     Stroke Maternal Grandfather     Prostate Cancer Paternal Grandfather        Allergies   Allergen Reactions   Coit Ser [Tofacitinib] Anaphylaxis and Other (See Comments)     gastritis    Ceclor [Cefaclor] Hives    Cephalosporins      hives         Current Facility-Administered Medications:     sodium chloride flush 0.9 % injection 10 mL, 10 mL, Intravenous, 2 times per day, Guanaco Benavidez MD    sodium chloride flush 0.9 % injection 10 mL, 10 mL, Intravenous, PRN, Thana Root, MD   meperidine (DEMEROL) injection 12.5 mg, 12.5 mg, Intravenous, Q5 Min PRN, Shira Carranza MD    morphine (PF) injection 2 mg, 2 mg, Intravenous, Q5 Min PRN, Shira Carranza MD    HYDROmorphone (DILAUDID) injection 0.5 mg, 0.5 mg, Intravenous, Q5 Min PRN, Shira Carranza MD    HYDROmorphone (DILAUDID) injection 0.25 mg, 0.25 mg, Intravenous, Q5 Min PRN, Shira Craranza MD    HYDROmorphone (DILAUDID) injection 0.5 mg, 0.5 mg, Intravenous, Q5 Min PRN, Shira Carranza MD    oxyCODONE-acetaminophen (PERCOCET) 5-325 MG per tablet 1 tablet, 1 tablet, Oral, PRN **OR** oxyCODONE-acetaminophen (PERCOCET) 5-325 MG per tablet 2 tablet, 2 tablet, Oral, PRN, Shira Craranza MD    ondansetron (ZOFRAN) injection 4 mg, 4 mg, Intravenous, Once PRN, Shira Carranza MD    promethazine (PHENERGAN) injection 12.5 mg, 12.5 mg, Intravenous, Q15 Min PRN, Shira Carranza MD    0.9 % sodium chloride bolus, 500 mL, Intravenous, Once PRN, Shira Carranza MD    diphenhydrAMINE (BENADRYL) injection 12.5 mg, 12.5 mg, Intravenous, Once PRN, Shira Carranza MD    labetalol (NORMODYNE;TRANDATE) injection syringe 5 mg, 5 mg, Intravenous, Q10 Min PRN, Shira Carranza MD    bupivacaine (MARCAINE) 0.25 % injection, , , PRN, Alice Stratton MD, 10 mL at 03/05/21 1137    dexamethasone (DECADRON) injection, , , PRN, Alice Stratton MD, 10 mg at 03/05/21 1137    Social History     Tobacco Use    Smoking status: Former Smoker     Packs/day: 0.25     Types: Cigarettes     Start date: 5/15/2014     Quit date: 3/5/2018     Years since quitting: 3.0    Smokeless tobacco: Never Used   Substance Use Topics    Alcohol use: Yes     Comment: socially       Review of Systems:   Focused review of systems was performed, and negative as pertinent to diagnosis, except as stated in HPI. Physical Exam  Constitutional:       Appearance: Normal appearance.    Pulmonary:      Effort: Pulmonary effort is normal. Neurological:      Mental Status: He is alert. Psychiatric:         Attention and Perception: Attention and perception normal.         Mood and Affect: Mood and affect normal.         Patient's current physical status, medications, medical history, and HPI have been reviewed and updated as appropriate on this date: 03/05/21    Risk/Benefit(s): The risks, benefits, alternatives, and potential complications have been discussed with the patient/family and informed consent has been obtained for the procedure/sedation.     Diagnosis:   M46.1     Plan: b/l supriya Delgado

## 2021-04-13 ENCOUNTER — HOSPITAL ENCOUNTER (OUTPATIENT)
Age: 31
Discharge: HOME | End: 2021-04-13
Payer: COMMERCIAL

## 2021-04-13 LAB
ESTRADIOL LEVEL: 49 PG/ML (ref 27–314)
FOLLICLE STIMULATING HORMONE: 10.6 U/L (ref 1.7–21.5)

## 2021-04-13 PROCEDURE — 82670 ASSAY OF TOTAL ESTRADIOL: CPT

## 2021-04-13 PROCEDURE — 36415 COLL VENOUS BLD VENIPUNCTURE: CPT

## 2021-04-13 PROCEDURE — 83001 ASSAY OF GONADOTROPIN (FSH): CPT

## 2021-04-15 ENCOUNTER — HOSPITAL ENCOUNTER (OUTPATIENT)
Age: 31
Setting detail: SPECIMEN
Discharge: HOME | End: 2021-04-15
Payer: COMMERCIAL

## 2021-04-15 ENCOUNTER — OFFICE VISIT (OUTPATIENT)
Dept: FAMILY MEDICINE CLINIC | Age: 31
End: 2021-04-15
Payer: COMMERCIAL

## 2021-04-15 VITALS
HEART RATE: 93 BPM | OXYGEN SATURATION: 99 % | WEIGHT: 197 LBS | TEMPERATURE: 98.4 F | DIASTOLIC BLOOD PRESSURE: 76 MMHG | HEIGHT: 62 IN | SYSTOLIC BLOOD PRESSURE: 116 MMHG | BODY MASS INDEX: 36.25 KG/M2

## 2021-04-15 DIAGNOSIS — Z13.6 SCREENING FOR CARDIOVASCULAR, RESPIRATORY, AND GENITOURINARY DISEASES: ICD-10-CM

## 2021-04-15 DIAGNOSIS — E83.42 HYPOMAGNESEMIA: ICD-10-CM

## 2021-04-15 DIAGNOSIS — F41.9 ANXIETY: ICD-10-CM

## 2021-04-15 DIAGNOSIS — Z76.89 ENCOUNTER TO ESTABLISH CARE: ICD-10-CM

## 2021-04-15 DIAGNOSIS — Z13.83 SCREENING FOR CARDIOVASCULAR, RESPIRATORY, AND GENITOURINARY DISEASES: ICD-10-CM

## 2021-04-15 DIAGNOSIS — E55.9 VITAMIN D DEFICIENCY: ICD-10-CM

## 2021-04-15 DIAGNOSIS — M08.00 JUVENILE RHEUMATOID ARTHRITIS (HCC): ICD-10-CM

## 2021-04-15 DIAGNOSIS — Z13.89 SCREENING FOR CARDIOVASCULAR, RESPIRATORY, AND GENITOURINARY DISEASES: ICD-10-CM

## 2021-04-15 DIAGNOSIS — K21.9 GASTROESOPHAGEAL REFLUX DISEASE, UNSPECIFIED WHETHER ESOPHAGITIS PRESENT: ICD-10-CM

## 2021-04-15 DIAGNOSIS — R73.9 HYPERGLYCEMIA: ICD-10-CM

## 2021-04-15 DIAGNOSIS — K21.9 GASTROESOPHAGEAL REFLUX DISEASE, UNSPECIFIED WHETHER ESOPHAGITIS PRESENT: Primary | ICD-10-CM

## 2021-04-15 LAB
ALBUMIN SERPL-MCNC: 4.7 G/DL (ref 3.5–5.2)
ALBUMIN/GLOBULIN RATIO: 1.6 (ref 1–2.5)
ALP BLD-CCNC: 113 U/L (ref 35–104)
ALT SERPL-CCNC: 24 U/L (ref 5–33)
ANION GAP SERPL CALCULATED.3IONS-SCNC: 13 MMOL/L (ref 9–17)
AST SERPL-CCNC: 21 U/L
BILIRUB SERPL-MCNC: 0.38 MG/DL (ref 0.3–1.2)
BUN BLDV-MCNC: 13 MG/DL (ref 6–20)
BUN/CREAT BLD: ABNORMAL (ref 9–20)
CALCIUM SERPL-MCNC: 9.7 MG/DL (ref 8.6–10.4)
CHLORIDE BLD-SCNC: 104 MMOL/L (ref 98–107)
CHOLESTEROL/HDL RATIO: 3.3
CHOLESTEROL: 175 MG/DL
CO2: 23 MMOL/L (ref 20–31)
CREAT SERPL-MCNC: 0.87 MG/DL (ref 0.5–0.9)
ESTIMATED AVERAGE GLUCOSE: 94 MG/DL
GFR AFRICAN AMERICAN: >60 ML/MIN
GFR NON-AFRICAN AMERICAN: >60 ML/MIN
GFR SERPL CREATININE-BSD FRML MDRD: ABNORMAL ML/MIN/{1.73_M2}
GFR SERPL CREATININE-BSD FRML MDRD: ABNORMAL ML/MIN/{1.73_M2}
GLUCOSE FASTING: 85 MG/DL (ref 70–99)
HBA1C MFR BLD: 4.9 % (ref 4–6)
HCT VFR BLD CALC: 45.2 % (ref 36.3–47.1)
HDLC SERPL-MCNC: 53 MG/DL
HEMOGLOBIN: 14.7 G/DL (ref 11.9–15.1)
LDL CHOLESTEROL: 108 MG/DL (ref 0–130)
MAGNESIUM: 2.3 MG/DL (ref 1.6–2.6)
MCH RBC QN AUTO: 28.9 PG (ref 25.2–33.5)
MCHC RBC AUTO-ENTMCNC: 32.5 G/DL (ref 28.4–34.8)
MCV RBC AUTO: 88.8 FL (ref 82.6–102.9)
NRBC AUTOMATED: 0 PER 100 WBC
PDW BLD-RTO: 13 % (ref 11.8–14.4)
PLATELET # BLD: 285 K/UL (ref 138–453)
PMV BLD AUTO: 9.7 FL (ref 8.1–13.5)
POTASSIUM SERPL-SCNC: 4 MMOL/L (ref 3.7–5.3)
RBC # BLD: 5.09 M/UL (ref 3.95–5.11)
SODIUM BLD-SCNC: 140 MMOL/L (ref 135–144)
TOTAL PROTEIN: 7.7 G/DL (ref 6.4–8.3)
TRIGL SERPL-MCNC: 71 MG/DL
TSH SERPL DL<=0.05 MIU/L-ACNC: 1.08 MIU/L (ref 0.3–5)
VITAMIN D 25-HYDROXY: 30.3 NG/ML (ref 30–100)
VLDLC SERPL CALC-MCNC: NORMAL MG/DL (ref 1–30)
WBC # BLD: 7.3 K/UL (ref 3.5–11.3)

## 2021-04-15 PROCEDURE — 4004F PT TOBACCO SCREEN RCVD TLK: CPT | Performed by: NURSE PRACTITIONER

## 2021-04-15 PROCEDURE — G8417 CALC BMI ABV UP PARAM F/U: HCPCS | Performed by: NURSE PRACTITIONER

## 2021-04-15 PROCEDURE — G8427 DOCREV CUR MEDS BY ELIG CLIN: HCPCS | Performed by: NURSE PRACTITIONER

## 2021-04-15 PROCEDURE — 99215 OFFICE O/P EST HI 40 MIN: CPT | Performed by: NURSE PRACTITIONER

## 2021-04-15 RX ORDER — ESTRADIOL 10 UG/1
10 INSERT VAGINAL DAILY
COMMUNITY
End: 2021-04-15 | Stop reason: CLARIF

## 2021-04-15 RX ORDER — PANTOPRAZOLE SODIUM 40 MG/1
40 TABLET, DELAYED RELEASE ORAL
Qty: 60 TABLET | Refills: 1 | Status: SHIPPED | OUTPATIENT
Start: 2021-04-15

## 2021-04-15 SDOH — HEALTH STABILITY: MENTAL HEALTH: HOW MANY STANDARD DRINKS CONTAINING ALCOHOL DO YOU HAVE ON A TYPICAL DAY?: 5 OR 6

## 2021-04-15 SDOH — ECONOMIC STABILITY: TRANSPORTATION INSECURITY
IN THE PAST 12 MONTHS, HAS THE LACK OF TRANSPORTATION KEPT YOU FROM MEDICAL APPOINTMENTS OR FROM GETTING MEDICATIONS?: NO

## 2021-04-15 SDOH — ECONOMIC STABILITY: INCOME INSECURITY: HOW HARD IS IT FOR YOU TO PAY FOR THE VERY BASICS LIKE FOOD, HOUSING, MEDICAL CARE, AND HEATING?: NOT HARD AT ALL

## 2021-04-15 SDOH — HEALTH STABILITY: MENTAL HEALTH: HOW OFTEN DO YOU HAVE A DRINK CONTAINING ALCOHOL?: MONTHLY OR LESS

## 2021-04-15 SDOH — ECONOMIC STABILITY: FOOD INSECURITY: WITHIN THE PAST 12 MONTHS, THE FOOD YOU BOUGHT JUST DIDN'T LAST AND YOU DIDN'T HAVE MONEY TO GET MORE.: NEVER TRUE

## 2021-04-15 ASSESSMENT — ENCOUNTER SYMPTOMS
CONSTIPATION: 1
ABDOMINAL PAIN: 0
BACK PAIN: 1
COUGH: 0
RHINORRHEA: 0
DIARRHEA: 0
SORE THROAT: 0
SHORTNESS OF BREATH: 0
HEARTBURN: 1
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
## 2021-04-15 NOTE — PATIENT INSTRUCTIONS
It was my pleasure to meet with you today. Please contact me with any questions or concerns, and please notify myself or our manager if there is anyway we can improve our service in your health care needs.  Below I have listed some instructions and information that pertain to today's visit.    -You have been advised to continue all current medication, otherwise not discussed in today's visit  -New medications and refills will been sent and made available at pharmacy or mail away  -Heathy daily diet to include healthy balanced diet with good portions of lean meats and vegetables  -Drink 6-8 glasses of water daily  -Complete  fasting (8-12 hours - water, black coffee, plain tea ok) labs and/any other testing ordered prior to next scheduled followup  -Continue daily exercise with in your physical capacity

## 2021-04-20 ENCOUNTER — TELEPHONE (OUTPATIENT)
Dept: PAIN MANAGEMENT | Age: 31
End: 2021-04-20

## 2021-05-26 ENCOUNTER — OFFICE VISIT (OUTPATIENT)
Dept: FAMILY MEDICINE CLINIC | Age: 31
End: 2021-05-26
Payer: COMMERCIAL

## 2021-05-26 VITALS
WEIGHT: 194 LBS | OXYGEN SATURATION: 98 % | TEMPERATURE: 97.8 F | HEART RATE: 87 BPM | DIASTOLIC BLOOD PRESSURE: 64 MMHG | BODY MASS INDEX: 35.48 KG/M2 | SYSTOLIC BLOOD PRESSURE: 108 MMHG

## 2021-05-26 DIAGNOSIS — K21.9 GASTROESOPHAGEAL REFLUX DISEASE, UNSPECIFIED WHETHER ESOPHAGITIS PRESENT: ICD-10-CM

## 2021-05-26 DIAGNOSIS — M08.00 JUVENILE RHEUMATOID ARTHRITIS (HCC): ICD-10-CM

## 2021-05-26 DIAGNOSIS — Z79.52 CURRENT CHRONIC USE OF SYSTEMIC STEROIDS: ICD-10-CM

## 2021-05-26 DIAGNOSIS — G89.29 CHRONIC BILATERAL LOW BACK PAIN WITH SCIATICA, SCIATICA LATERALITY UNSPECIFIED: Primary | ICD-10-CM

## 2021-05-26 DIAGNOSIS — M54.40 CHRONIC BILATERAL LOW BACK PAIN WITH SCIATICA, SCIATICA LATERALITY UNSPECIFIED: Primary | ICD-10-CM

## 2021-05-26 DIAGNOSIS — Z92.21 HX OF METHOTREXATE THERAPY: ICD-10-CM

## 2021-05-26 PROCEDURE — G8427 DOCREV CUR MEDS BY ELIG CLIN: HCPCS | Performed by: NURSE PRACTITIONER

## 2021-05-26 PROCEDURE — G8417 CALC BMI ABV UP PARAM F/U: HCPCS | Performed by: NURSE PRACTITIONER

## 2021-05-26 PROCEDURE — 4004F PT TOBACCO SCREEN RCVD TLK: CPT | Performed by: NURSE PRACTITIONER

## 2021-05-26 PROCEDURE — 99215 OFFICE O/P EST HI 40 MIN: CPT | Performed by: NURSE PRACTITIONER

## 2021-05-26 RX ORDER — LANSOPRAZOLE 30 MG/1
30 CAPSULE, DELAYED RELEASE ORAL DAILY
Qty: 90 CAPSULE | Refills: 1 | Status: SHIPPED | OUTPATIENT
Start: 2021-05-26

## 2021-05-26 ASSESSMENT — ENCOUNTER SYMPTOMS: BACK PAIN: 1

## 2021-05-26 NOTE — PROGRESS NOTES
[address] [facility]   [address]   [phone]    2021     CHIEF COMPLAINT:     Brooks Hook (:  1990) is a 27 y.o. female, here for evaluation of the following chief complaint(s):  Back Pain      REVIEWED INFORMATION      Allergies   Allergen Reactions   Al Payer [Tofacitinib] Anaphylaxis and Other (See Comments)     gastritis    Ceclor [Cefaclor] Hives    Cephalosporins      hives       Current Outpatient Medications   Medication Sig Dispense Refill    lansoprazole (PREVACID) 30 MG delayed release capsule Take 1 capsule by mouth daily 90 capsule 1    pantoprazole (PROTONIX) 40 MG tablet Take 1 tablet by mouth 2 times daily (before meals) 60 tablet 1    diclofenac sodium (VOLTAREN) 1 % GEL Apply 2 g topically 4 times daily as needed for Pain 350 g 3    Multiple Vitamin (MULTI-VITAMIN DAILY PO) Take by mouth      vitamin B-12 (CYANOCOBALAMIN) 100 MCG tablet Take 50 mcg by mouth daily      gabapentin (NEURONTIN) 300 MG capsule Take 1 capsule by mouth 3 times daily for 30 days. (Patient taking differently: Take 300 mg by mouth 3 times daily. 300 mg Nightly) 90 capsule 1    albuterol sulfate HFA (PROAIR HFA) 108 (90 Base) MCG/ACT inhaler Inhale 2 puffs into the lungs every 6 hours as needed for Wheezing 3 Inhaler 3     No current facility-administered medications for this visit. Patient Care Team:  VERONICA Lópze CNP as PCP - General (Nurse Practitioner)  VERONICA López CNP as PCP - [facility] Empaneled Provider  Alejandrina Buckley MD as Consulting Physician (Rheumatology)  Jaswant Donaldson as Consulting Physician (Obstetrics & Gynecology)  Nallely Pearl MD as Surgeon (General Surgery)    REVIEW OF SYSTEMS:     Review of Systems   Constitutional: Negative for activity change, fatigue and unexpected weight change. HENT: Negative for congestion, ear pain, hearing loss, rhinorrhea and sore throat.     Respiratory: Negative for posture and history of steroid use (history of JRA with extended use of methotrexate and DDD). History of RA - methotrexa usage     PHYSICAL EXAM:     /64   Pulse 87   Temp 97.8 °F (36.6 °C) (Temporal)   Wt 194 lb (88 kg)   SpO2 98%   BMI 35.48 kg/m²      Physical Exam  Constitutional:       Appearance: Normal appearance. She is well-developed. She is not ill-appearing. Eyes:      General:         Right eye: No discharge. Left eye: No discharge. Extraocular Movements: Extraocular movements intact. Conjunctiva/sclera: Conjunctivae normal.   Neck:      Thyroid: No thyroid mass. Vascular: No JVD. Pulmonary:      Effort: Pulmonary effort is normal. No respiratory distress. Musculoskeletal:      Right shoulder: No deformity. Normal range of motion. Left shoulder: No deformity. Normal range of motion. Cervical back: Normal range of motion. Skin:     General: Skin is moist.      Coloration: Skin is not cyanotic or jaundiced. Findings: No rash. Neurological:      General: No focal deficit present. Mental Status: She is alert and oriented to person, place, and time. Gait: Gait is intact. Psychiatric:         Attention and Perception: Attention normal. She does not perceive auditory or visual hallucinations. Mood and Affect: Mood normal.         Speech: Speech normal.          PROCEDURE/ IN OFFICE TESTING     No in office testing or procedures completed during today's office visit. ASSESSMENT/PLAN/ FOLLOWUP:     1. Chronic bilateral low back pain with sciatica, sciatica laterality unspecified  -     [facility] Walter Burgess  -     External Referral To Rheumatology  2. Gastroesophageal reflux disease, unspecified whether esophagitis present  -     lansoprazole (PREVACID) 30 MG delayed release capsule; Take 1 capsule by mouth daily, Disp-90 capsule, R-1Normal  3.  Current chronic use of systemic steroids  -      DEXA SCAN; Future  4. Hx of methotrexate therapy  -      DEXA SCAN; Future  5. Juvenile rheumatoid arthritis ([facility] [address]  -      DEXA SCAN; Future  -     External Referral To Rheumatology      Return in about 3 months (around 8/26/2021) for recheck symptoms of today's primary complaint. COMMUNICATION:       On this date 5/26/2021 I have spent 40 minutes reviewing previous notes, test results and face to face with the patient discussing the diagnosis and importance of compliance with the treatment plan as well as documenting on the day of the visit. The best way to find yourself is to lose yourself in the service of others - [address] [place] [address]. [address] [place] [address]   [email or web address]  Office: [phone]     An electronic signature was used to authenticate this note.   Signed by VERONICA Niño CNP, APRN-CNP on 5/26/2021 at 10:54 AM

## 2021-05-31 ASSESSMENT — ENCOUNTER SYMPTOMS
SHORTNESS OF BREATH: 0
RHINORRHEA: 0
DIARRHEA: 0
COUGH: 0
SORE THROAT: 0
CONSTIPATION: 0

## 2021-06-03 ENCOUNTER — HOSPITAL ENCOUNTER (OUTPATIENT)
Dept: PHYSICAL THERAPY | Facility: CLINIC | Age: 31
Setting detail: THERAPIES SERIES
Discharge: HOME | End: 2021-06-03
Payer: COMMERCIAL

## 2021-06-03 PROCEDURE — 97161 PT EVAL LOW COMPLEX 20 MIN: CPT

## 2021-06-03 PROCEDURE — 97110 THERAPEUTIC EXERCISES: CPT

## 2021-06-03 PROCEDURE — 97140 MANUAL THERAPY 1/> REGIONS: CPT
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
## 2021-06-07 ENCOUNTER — HOSPITAL ENCOUNTER (OUTPATIENT)
Dept: PHYSICAL THERAPY | Facility: CLINIC | Age: 31
Setting detail: THERAPIES SERIES
Discharge: HOME | End: 2021-06-07
Payer: COMMERCIAL

## 2021-06-07 NOTE — FLOWSHEET NOTE
[] [facility]) [facility] &  Therapy  [address]    [phone]  F: [phone]   [] [address]  [facility] [address]   [address]  P: [phone]  F: [phone]  [] [place]  [address]  P: [phone]  F: [phone] [x] [address] [employer] [address]  P: [phone]  F: [phone]  [] [address]  [address] [facility] [address]   [address]   [place]: [phone]  F: [phone]   [] [facility]  [address] [facility] [address]  [place]: [phone]   F: [phone]     Physical Therapy Cancel/No Show note    Date: 2021  Patient: Ruthy Skinner  : 1990  MRN: [id number]    Cancels/No Shows to date:     For today's appointment patient:    [x]  Cancelled    [] Rescheduled appointment    [] No-show     Reason given by patient:    []  Patient ill    []  Conflicting appointment    [] No transportation      [] Conflict with work    [] No reason given    [] Weather related    [] COVID-19    [x] Other:      Comments: Pt states she is unable to come to therapy because she fractured her foot. She states she plans on coming to her appointment on Thursday.        [x] Next appointment was confirmed    Electronically signed by: Lucien Valera PTA

## 2021-06-10 ENCOUNTER — HOSPITAL ENCOUNTER (OUTPATIENT)
Dept: PHYSICAL THERAPY | Facility: CLINIC | Age: 31
Setting detail: THERAPIES SERIES
Discharge: HOME | End: 2021-06-10
Payer: COMMERCIAL

## 2021-06-10 PROCEDURE — 97110 THERAPEUTIC EXERCISES: CPT

## 2021-06-10 PROCEDURE — 97016 VASOPNEUMATIC DEVICE THERAPY: CPT

## 2021-06-10 PROCEDURE — 97140 MANUAL THERAPY 1/> REGIONS: CPT

## 2021-06-10 NOTE — FLOWSHEET NOTE
[] [facility] &  Therapy  [address]  [phone]  F: [phone] [] [address]  [employer]   [address]  P: [phone]  F: [phone] [] [address] &  Therapy  [address]  P: [phone]  F: [phone] [] [address]  P: [phone]  F: [phone] [] [address]  [facility]   [address]   [place]: [phone]  F: [phone]      Physical Therapy Daily Treatment Note    Date:  6/10/2021  Patient Name:  Torrie Arango    :  1990  MRN: [id number]  Physician: MICHEAL Robert                Insurance: [employer]  Medical Diagnosis: chronic Kurtis LBP                         Rehab Codes: M54.5  Onset Date: 21 (order written)             Next 's appt. : 8    Visit# / total visits:     Cancels/No Shows: 0/0    Subjective:    Pain:  [x] Yes  [] No Location: LBP, L hip   Pain Rating: (0-10 scale) 5-6/10  Pain altered Tx:  [x] No  [] Yes  Action:  Comments: Pt reports dropping fire wood on L foot on Monday. Pt states pain in L low back and hip remains 5-6/10 pretty constant.     Objective:  Modalities: none  Precautions: RA,   Exercises:  Exercise Reps/ Time Weight/ Level Issued to HEP Completed Comments   Bike 5 Seat1   x                   Supine             Piriformis stretch  2x30\"   x Bilat    Diaphragmatic breathing 15x    x x     Glut sets 10x5\"     x     Bridges 2x10  2\"  x x  Cues to engage TA    Sidelying             Ana hip abd 10x     x   Bilat    Clam shells  10x     x  Bilat   prone             Diaphragmatic breathing 15x     x      Hip ext 10x ea      x Bilat    Gym             Sit to stand  *           Hip adductor stretch *                         Other:  Manual  1.  DI to kurtis proximal hip flexors  2. IASTM to kurtis gluts  3. STM B lumbar paraspinals     Specific Instructions for next treatment:  1. Continue with manual  2. Add strengthening ex for gluts and TVA, flexibility to hip flexors and adductors    Treatment Charges: Mins Units   []  Modalities     [x]  Ther Exercise 30 2   [x]  Manual Therapy 20 1   []  Ther Activities     []  Aquatics     [x]  Vasocompression 10 1   []  Other     Total Treatment time 60 4       Assessment: [x] Progressing toward goals. Initiated tx on bike with fair camila followed by manual and stretches as listed above. Initiated gluteal and hip strengthening exercises this date with good camila. Ended with vaso for pain management, positive relief reported after vaso. Plan to progress as able without increasing pain, also plan on updating HEP next visit. [] No change. [] Other:  [x] Patient would continue to benefit from skilled physical therapy services in order to: decrease spasm of hip flexors and piriformis, improve flexibility of hip flexors, improve strength of gluts and TVA. STG/LTG  STG: (to be met in 10treatments)  1. ? Pain:<3/10 in LB  2. ? ROM: increase hip ext to 10 deg  3. ? Strength: at least 4/5 with hips  4. ? Function:Modified Oswestry to <20% interference  5. Patient to be independent with home exercise program as demonstrated by performance with correct form without cues.     LTG: (to be met in 20 treatments)  1. Pain <2/10  2. Modified Oswestry to <10% interference   3. Minimal tenderness on Kurtis hip flexors and gluts        Patient goals: \"ways to relive pain, strengthen muscle in back and core\"    Pt. Education:  [x] Yes  [] No  [] Reviewed Prior HEP/Ed  Method of Education: [x] Verbal  [x] Demo  [] Written  Comprehension of Education:  [] Verbalizes understanding. [] Demonstrates understanding. [x] Needs review. [x] Demonstrates/verbalizes HEP/Ed previously given.      Plan: [x] Continue current frequency toward long and short term goals. [x] Specific Instructions for subsequent treatments: Updated HEP.     Frequency:  2 x/week for 20 visits      Time In: 5:00PM            Time Out: 6:02PM    Electronically signed by:  Anne Valle PTA

## 2021-06-14 ENCOUNTER — HOSPITAL ENCOUNTER (OUTPATIENT)
Dept: PHYSICAL THERAPY | Facility: CLINIC | Age: 31
Setting detail: THERAPIES SERIES
Discharge: HOME | End: 2021-06-14
Payer: COMMERCIAL

## 2021-06-14 PROCEDURE — 97110 THERAPEUTIC EXERCISES: CPT

## 2021-06-14 PROCEDURE — 97140 MANUAL THERAPY 1/> REGIONS: CPT

## 2021-06-14 PROCEDURE — 97016 VASOPNEUMATIC DEVICE THERAPY: CPT

## 2021-06-14 NOTE — FLOWSHEET NOTE
[] [facility]) - [facility] &  Therapy  [address]  [phone]  F: [phone] [] [address]  [address] [employer]   [address]  P: [phone]  F: [phone] [] [address] &  Therapy  [address]  P: [phone]  F: [phone] [x] [address]  P: [phone]  F: [phone] [] [address]  [facility]   [address]   [place]: [phone]  F: [phone]      Physical Therapy Daily Treatment Note    Date:  2021  Patient Name:  Amy Rodriguez    :  1990  MRN: [id number]  Physician: MICHEAL Burleson                Insurance: [employer]  Medical Diagnosis: chronic Kurtis LBP                         Rehab Codes: M54.5  Onset Date: 21 (order written)             Next 's appt. : 8    Visit# / total visits: 3/ 20    Cancels/No Shows: 0/0    Subjective:    Pain:  [x] Yes  [] No Location: LBP, L hip   Pain Rating: (0-10 scale) 5-6/10  Pain altered Tx:  [x] No  [] Yes  Action:  Comments: Pt arrives reporting soreness from a busy weekend and playing baseball.      Objective:  Modalities: none  Precautions: RA,   Exercises:  Exercise Reps/ Time Weight/ Level Issued to HEP Completed Comments   Bike 5' Seat at 1   x                   Supine             Piriformis stretch  2x30\"   x Bilat    Diaphragmatic breathing 15x    x x     Glut sets 10x5\"     x     Bridges 3x10  2\"  x x  Cues to engage TA            Sidelying             Ana hip abd 2x10     x   Bilat    Clam shells  2x10     x  Bilat           Prone             Diaphragmatic breathing 15x     x      Hip ext 2x10      x Bilat            Gym             Sit to stand  *           Hip adductor stretch (Bent knee fall out in supine) 2x20''     x Cues to maintain opp hip on mat                 Other:  Manual  1.  DI to kurtis proximal hip flexors  2. IASTM to kurtis gluts  3. STM B lumbar paraspinals     Specific Instructions for next treatment:   1. Continue with manual  2. Add strengthening ex for gluts and TVA, flexibility to hip flexors and adductors    Treatment Charges: Mins Units   []  Modalities     [x]  Ther Exercise 30 2   [x]  Manual Therapy 20 1   []  Ther Activities     []  Aquatics     [x]  Vasocompression 15 1   []  Other     Total Treatment time 65 4       Assessment: [x] Progressing toward goals. Cont POC as mentioned above w/ additional supine adductor stretch in bent knee fall out form w/ decr ROM of LLE and incr tension noted. DI to distal hip flexor w/ decr tension reported post. Vaso to end treatment to L hip and back. Pt reports slight tightening of her low back post, able to relieve it by forward flexion hang. [] No change. [] Other:  [x] Patient would continue to benefit from skilled physical therapy services in order to: decrease spasm of hip flexors and piriformis, improve flexibility of hip flexors, improve strength of gluts and TVA. STG/LTG  STG: (to be met in 10treatments)  1. ? Pain:<3/10 in LB  2. ? ROM: increase hip ext to 10 deg  3. ? Strength: at least 4/5 with hips  4. ? Function:Modified Oswestry to <20% interference  5. Patient to be independent with home exercise program as demonstrated by performance with correct form without cues.     LTG: (to be met in 20 treatments)  1. Pain <2/10  2. Modified Oswestry to <10% interference   3. Minimal tenderness on Kurtis hip flexors and gluts        Patient goals: \"ways to relive pain, strengthen muscle in back and core\"    Pt. Education:  [x] Yes  [] No  [] Reviewed Prior HEP/Ed  Method of Education: [x] Verbal  [x] Demo  [] Written  Comprehension of Education:  [] Verbalizes understanding. [] Demonstrates understanding. [x] Needs review.   [x] Demonstrates/verbalizes HEP/Ed previously given.     Plan: [x] Continue current frequency toward long and short term goals. [x] Specific Instructions for subsequent treatments: Updated HEP.     Frequency:  2 x/week for 20 visits      Time In: 1048            Time Out: 2544    Electronically signed by:  Lorenza Ulloa PTA

## 2021-06-17 ENCOUNTER — HOSPITAL ENCOUNTER (OUTPATIENT)
Dept: PHYSICAL THERAPY | Facility: CLINIC | Age: 31
Setting detail: THERAPIES SERIES
Discharge: HOME | End: 2021-06-17
Payer: COMMERCIAL

## 2021-06-17 PROCEDURE — 97110 THERAPEUTIC EXERCISES: CPT

## 2021-06-17 PROCEDURE — 97140 MANUAL THERAPY 1/> REGIONS: CPT

## 2021-06-17 NOTE — FLOWSHEET NOTE
[] [facility]) - [facility] &  Therapy  [address]  [phone]  F: [phone] [] [address]  [facility]   [address]  P: [phone]  F: [phone] [] [address] &  Therapy  [address]  P: [phone]  F: [phone] [x] [address]  P: [phone]  F: [phone] [] [address]  [facility]   [address]   [place]: [phone]  F: [phone]      Physical Therapy Daily Treatment Note    Date:  2021  Patient Name:  Álvaro Maki    :  1990  MRN: [id number]  Physician: MICHEAL Stevenson                Insurance: [employer]  Medical Diagnosis: chronic Kurtis LBP                         Rehab Codes: M54.5  Onset Date: 21 (order written)             Next 's appt. : 8    Visit# / total visits:     Cancels/No Shows: 0/0    Subjective:    Pain:  [x] Yes  [] No Location: LBP, L hip   Pain Rating: (0-10 scale) 7-8/10  Pain altered Tx:  [x] No  [] Yes  Action:  Comments: Pt reports increased LBP and \"sciatic pain\" possibly from sleeping on her kid's bed last night. Tingling persists down post L hip/thigh.      Objective:  Modalities: none  Precautions: RA,   Exercises:  Exercise Reps/ Time Weight/ Level Issued to HEP Completed Comments   Bike 5' Seat at 1   x                   Supine             Piriformis stretch  2x30\"   x Bilat    BKFO Adductor stretch 2x30\"   x    Diaphragmatic breathing 20x    x x     Glut sets 10x5\"     x     Bridges 3x10  2\"  x x  Cues to engage TA    DLS        PPT 77A 5\"  x x    PPT+ALT UE 2x10   x    PPT+ALT LE 2x10   x    PPT+ALT UE/LE 2x10   x    Sidelying             Ana hip abd 2x10    x -  Bilat    Clam shells  2x10    x -  Bilat           Prone           Diaphragmatic breathing 15x     -      Hip ext 2x10      - Bilat            Gym             Sit to stand  *     -                   Other:  Manual  1.  DI to kurtis proximal hip flexors  2. IASTM to kurtis gluts  3. STM B lumbar paraspinals     Specific Instructions for next treatment:   1. Continue with manual  2. Add strengthening ex for gluts and TVA, flexibility to hip flexors and adductors    Treatment Charges: Mins Units   []  Modalities     [x]  Ther Exercise 30 2   [x]  Manual Therapy 20 1   []  Ther Activities     []  Aquatics     []  Vasocompression     []  Other     Total Treatment time 50 3       Assessment: [x] Progressing toward goals. PTA offered pt MHP at the beginning of tx, pt denies need. Progressed pt with DLS exercises with good tolerance. Cont with manual as listed above, pt reports decreased tightness and pain in low back after tx, rating pain at 5-6/10. [] No change. [] Other:  [x] Patient would continue to benefit from skilled physical therapy services in order to: decrease spasm of hip flexors and piriformis, improve flexibility of hip flexors, improve strength of gluts and TVA. STG/LTG  STG: (to be met in 10treatments)  1. ? Pain:<3/10 in LB  2. ? ROM: increase hip ext to 10 deg  3. ? Strength: at least 4/5 with hips  4. ? Function:Modified Oswestry to <20% interference  5. Patient to be independent with home exercise program as demonstrated by performance with correct form without cues.     LTG: (to be met in 20 treatments)  1. Pain <2/10  2. Modified Oswestry to <10% interference   3. Minimal tenderness on Kurtis hip flexors and gluts        Patient goals: \"ways to relive pain, strengthen muscle in back and core\"    Pt. Education:  [x] Yes  [] No  [] Reviewed Prior HEP/Ed  Method of Education: [x] Verbal  [x] Demo  [] Written  Comprehension of Education:  [] Verbalizes understanding. [] Demonstrates understanding. [x] Needs review.   [x] Demonstrates/verbalizes HEP/Ed previously given.     Plan: [x] Continue current frequency toward long and short term goals. [x] Specific Instructions for subsequent treatments: Updated HEP.     Frequency:  2 x/week for 20 visits      Time In: 5:00pm            Time Out: 6:00pm    Electronically signed by:  Carla Jones PTA

## 2021-06-21 ENCOUNTER — HOSPITAL ENCOUNTER (OUTPATIENT)
Dept: PHYSICAL THERAPY | Facility: CLINIC | Age: 31
Setting detail: THERAPIES SERIES
Discharge: HOME | End: 2021-06-21
Payer: COMMERCIAL

## 2021-06-24 ENCOUNTER — HOSPITAL ENCOUNTER (OUTPATIENT)
Dept: PHYSICAL THERAPY | Facility: CLINIC | Age: 31
Setting detail: THERAPIES SERIES
Discharge: HOME | End: 2021-06-24
Payer: COMMERCIAL

## 2021-06-24 NOTE — FLOWSHEET NOTE
[] [address]  [address]    [phone]  F: [phone]   [] [address]  [facility]   [address]  P: [phone]  F: [phone]  [] [place]  [address] [facility]  P: [phone]  F: [phone] [x] [address]  P: [phone]  F: [phone]  [] [address]  [address] [facility] [address]   [address]   [place]: [phone]  F: [phone]   [] [facility]  [address] [facility] [address]  [place]: [phone]   F: [phone]     Physical Therapy Cancel/No Show note    Date: 2021  Patient: Vincent Chamorro  : 1990  MRN: [id number]    Cancels/No Shows to date: 3/0    For today's appointment patient:    [x]  Cancelled    [] Rescheduled appointment    [] No-show     Reason given by patient:    []  Patient ill    []  Conflicting appointment    [] No transportation      [] Conflict with work    [] No reason given    [] Weather related    [] COVID-19    [x] Other:      Comments: No  at time of appointment.          [x] Next appointment was confirmed    Electronically signed by: Fred Siegel

## 2021-06-28 ENCOUNTER — HOSPITAL ENCOUNTER (OUTPATIENT)
Dept: PHYSICAL THERAPY | Facility: CLINIC | Age: 31
Setting detail: THERAPIES SERIES
Discharge: HOME | End: 2021-06-28
Payer: COMMERCIAL

## 2021-06-28 PROCEDURE — 97110 THERAPEUTIC EXERCISES: CPT

## 2021-06-28 PROCEDURE — 97140 MANUAL THERAPY 1/> REGIONS: CPT

## 2021-06-28 NOTE — FLOWSHEET NOTE
[] [facility]) - [facility] &  Therapy  [address]  [phone]  F: [phone] [] [address]  [address]   [address]  P: [phone]  F: [phone] [] [address] &  Therapy  [address]  P: [phone]  F: [phone] [x] [address]  P: [phone]  F: [phone] [] [address]  [facility]   [address]   [place]: [phone]  F: [phone]      Physical Therapy Daily Treatment Note    Date:  2021  Patient Name:  Lisandro Tapia    :  1990  MRN: [id number]  Physician: MICHEAL Pena                Insurance: [employer]  Medical Diagnosis: chronic Kurtis LBP                         Rehab Codes: M54.5  Onset Date: 21 (order written)             Next 's appt. : 8    Visit# / total visits:     Cancels/No Shows: 0/0    Subjective:    Pain:  [x] Yes  [] No Location: LBP, L hip   Pain Rating: (0-10 scale) 5-6/10  Pain altered Tx:  [x] No  [] Yes  Action:  Comments: Pt arrives reporting increased stiffness this date, otherwise was feeling a lot better since last visit.       Objective:  Modalities: MHP  Precautions: RA,   Exercises:  Exercise Reps/ Time Weight/ Level Issued to HEP Completed Comments   Bike 5' Seat at 1  x                   Supine             Piriformis stretch  2x30\"   x Bilat    BKFO Adductor stretch 2x30\"   x    Diaphragmatic breathing 20x    x x     Glut sets 10x5\"     x     Bridges 3x10  2\"  x x  Cues to engage TA    DLS        PPT 56L 5\"  x -    PPT+ALT UE 2x10   -    PPT+ALT LE 2x10   -    PPT+ALT UE/LE 2x10   -    Sidelying             Ana hip abd 2x10    x -  Bilat    Clam shells  2x10 lime  x x  Bilat           Prone             Diaphragmatic breathing 15x     -      Hip ext 2x10      - Bilat            Gym             Sit to stand from chair 10x     x No UE support   Hip ext + abd 10x ea  lime    x      Other:  Manual  1.  DI to isabella proximal hip flexors  2. IASTM to isabella gluts  3. STM B lumbar paraspinals     Specific Instructions for next treatment:   1. Continue with manual  2. Add strengthening ex for gluts and TVA, flexibility to hip flexors and adductors    Treatment Charges: Mins Units   [x]  Modalities 10 -   [x]  Ther Exercise 25 2   [x]  Manual Therapy 20 1   []  Ther Activities     []  Aquatics     []  Vasocompression     []  Other     Total Treatment time 60 3   5' on AirDyne    Assessment: [x] Progressing toward goals. MHP to start d/t current feelings of spasm sending radiating sciatica symptoms. MFR to follow MHP to bilateral glut med and lumbar paraspinals in prone w/ two pillows at hips w/ decr tension reported. AirDyne for 5' to warm-up prior to strengthening. Added squats to chair w/ no UE support w/ cues for tA activation to support low back w/ good carryover to pt. Also added hip extension and hip abduction w/ TB w/ no incr in pain. Cont to mat for exercises. MET correction to SIJ slight anterior rotation on the R side. Decr pain and \"pinching\" pain reported. Educated pt on importance of HEP w/ understanding demonstrated. [] No change. [] Other:  [x] Patient would continue to benefit from skilled physical therapy services in order to: decrease spasm of hip flexors and piriformis, improve flexibility of hip flexors, improve strength of gluts and TVA. STG/LTG  STG: (to be met in 10treatments)  1. ? Pain:<3/10 in LB  2. ? ROM: increase hip ext to 10 deg  3. ? Strength: at least 4/5 with hips  4. ? Function:Modified Oswestry to <20% interference  5. Patient to be independent with home exercise program as demonstrated by performance with correct form without cues.     LTG: (to be met in 20 treatments)  1. Pain <2/10  2.  Modified Oswestry to <10% interference   3. Minimal tenderness on Kurtis hip flexors and gluts        Patient goals: \"ways to relive pain, strengthen muscle in back and core\"    Pt. Education:  [x] Yes  [] No  [] Reviewed Prior HEP/Ed  Method of Education: [x] Verbal  [x] Demo  [] Written  Comprehension of Education:  [] Verbalizes understanding. [] Demonstrates understanding. [x] Needs review. [x] Demonstrates/verbalizes HEP/Ed previously given. Plan: [x] Continue current frequency toward long and short term goals. [x] Specific Instructions for subsequent treatments: Updated HEP.     Frequency:  2 x/week for 20 visits      Time In: 1700           Time Out: 2057 [facility]    Electronically signed by:  Chandra Clark PTA

## 2021-07-06 ENCOUNTER — HOSPITAL ENCOUNTER (OUTPATIENT)
Dept: PHYSICAL THERAPY | Facility: CLINIC | Age: 31
Setting detail: THERAPIES SERIES
Discharge: HOME | End: 2021-07-06
Payer: COMMERCIAL

## 2021-07-06 PROCEDURE — 97140 MANUAL THERAPY 1/> REGIONS: CPT

## 2021-07-06 PROCEDURE — 97110 THERAPEUTIC EXERCISES: CPT

## 2021-07-06 NOTE — FLOWSHEET NOTE
[] [facility]) [facility] &  Therapy  [address]  [phone]  F: [phone] [] [address]  [facility]   [address]  P: [phone]  F: [phone] [] [address] &  Therapy  [address] [facility] [address] [facility] [address] [address]  P: [phone]  F: [phone] [x] [address]  P: [phone]  F: [phone] [] [address]  [facility]   [address]   [place]: [phone]  F: [phone]      Physical Therapy Daily Treatment Note    Date:  2021  Patient Name:  Joan Manuel    :  1990  MRN: [id number]  Physician: MICHEAL Mack                Insurance: [employer]  Medical Diagnosis: chronic Kurtis LBP                         Rehab Codes: M54.5  Onset Date: 21 (order written)             Next 's appt. : 8    Visit# / total visits:     Cancels/No Shows: 0/0    Subjective:    Pain:  [x] Yes  [] No Location: LBP, L hip   Pain Rating: (0-10 scale) 4-5/10  Pain altered Tx:  [x] No  [] Yes  Action:  Comments: Pt reports \"feeling pretty good today\". States her back is a little stiff from throwing kids around in the pool over the weekend.      Objective:  Modalities: MHP  Precautions: RA,   Exercises:  Exercise Reps/ Time Weight/ Level Issued to HEP Completed Comments   Bike 5' Seat at 1  x                   Supine             EOB hip flexor str  1' ea    x    Piriformis stretch  2x30\"   x Bilat    BKFO Adductor stretch 2x30\"   x    Diaphragmatic breathing 20x    x -     Glut sets 10x5\"     -     Bridges 3x10  2\"  x -  Cues to engage TA    DLS        PPT 44V 5\"  x -    PPT+ALT UE 2x10   x    PPT+SLR 2x10  x x    PPT+ALT UE/LE 3x10   x Knee taps opp UE   Crunches  3x10  x x    Sidelying             Ana hip abd 2x10   x -  Bilat    Clam shells understanding. [x] Needs review. [x] Demonstrates/verbalizes HEP/Ed previously given. Plan: [x] Continue current frequency toward long and short term goals.     [x] Specific Instructions for subsequent treatments:    Frequency:  2 x/week for 20 visits      Time In: 5:00pm           Time Out: 6:00pm    Electronically signed by:  Pa Oglesby PTA

## 2021-07-08 ENCOUNTER — HOSPITAL ENCOUNTER (OUTPATIENT)
Dept: PHYSICAL THERAPY | Facility: CLINIC | Age: 31
Setting detail: THERAPIES SERIES
Discharge: HOME | End: 2021-07-08
Payer: COMMERCIAL

## 2021-07-08 PROCEDURE — 97140 MANUAL THERAPY 1/> REGIONS: CPT

## 2021-07-08 PROCEDURE — 97110 THERAPEUTIC EXERCISES: CPT

## 2021-07-08 NOTE — FLOWSHEET NOTE
[] [address]  [address]  [phone]  F: [phone] [] [address]  [facility] 36   [address]  P: [phone]  F: [phone] [] [address]  [address] [facility] [address]  P: [phone]  F: [phone] [x] [address]  P: [phone]  F: [phone] [] [address]  [facility]   [address]   [place]: [phone]  F: [phone]      Physical Therapy Daily Treatment Note    Date:  2021  Patient Name:  Roxi Rice    :  1990  MRN: [id number]  Physician: MICHEAL Agrawal                Insurance: [employer]  Medical Diagnosis: chronic Kurtis LBP                         Rehab Codes: M54.5  Onset Date: 21 (order written)             Next 's appt. : 8    Visit# / total visits:     Cancels/No Shows: 0/0    Subjective:    Pain:  [x] Yes  [] No Location: LBP, L hip   Pain Rating: (0-10 scale) 5/10  Pain altered Tx:  [x] No  [] Yes  Action:  Comments: Pt reports feeling pretty good, states that she thinks the core exercises are helping.      Objective:  Modalities: MHP  Precautions: RA,   Exercises:  Exercise Reps/ Time Weight/ Level Issued to HEP Completed Comments   Bike 7' Seat at 1  x                   Supine             Piriformis stretch  2x30\"   x Bilat    BKFO Adductor stretch 2x30\"   x    DKC 2x30\"   x    Diaphragmatic breathing 20x    x -     Glut sets 10x5\"     -     Bridges 2x10  2\"  x x  Cues to engage TA first; coming down each vertebrae      DLS        PPT 85T 44\"  x x    PPT+ALT UE 2x10   -    PPT+SLR 3x10  x x    PPT+ALT UE/LE 3x10   x Knee taps opp UE   Crunches  3x15  x x    Sidelying             Ana hip abd 2x10   x x  Bilat    Clam shells  2x10  x x  Bilat; causes incr pain with band, performed AROM today           Prone             Diaphragmatic breathing 15x     -      Hip ext 2x10      - Bilat; 2 pillows under hips            Gym             Palloff press  2x10 [place]  x    Other:  Manual  1.  DI to kurtis proximal hip flexors  2. IASTM to kurtis gluts  3. IASTM B lumbar paraspinals     Specific Instructions for next treatment:   1. Continue with manual  2. Add strengthening ex for gluts and TVA, flexibility to hip flexors and adductors    Treatment Charges: Mins Units   [x]  Modalities     [x]  Ther Exercise 40 3   [x]  Manual Therapy 15 1   []  Ther Activities     []  Aquatics     []  Vasocompression     []  Other     Total Treatment time 55 4   7' on AirDyne    Assessment: [x] Progressing toward goals. Initiated tx with incr time on bike followed by core and hip strengthening. Able to resume side lying hip ABD, took away band with clamshells to avoid symptom aggravation. Ended with manual as listed above. Good camila to interventions this date. [] No change. [] Other:  [x] Patient would continue to benefit from skilled physical therapy services in order to: decrease spasm of hip flexors and piriformis, improve flexibility of hip flexors, improve strength of gluts and TVA. STG/LTG  STG: (to be met in 10treatments)  1. ? Pain:<3/10 in LB  2. ? ROM: increase hip ext to 10 deg  3. ? Strength: at least 4/5 with hips  4. ? Function:Modified Oswestry to <20% interference  5. Patient to be independent with home exercise program as demonstrated by performance with correct form without cues.     LTG: (to be met in 20 treatments)  1. Pain <2/10  2. Modified Oswestry to <10% interference   3. Minimal tenderness on Kurtis hip flexors and gluts      Patient goals: \"ways to relive pain, strengthen muscle in back and core\"    Pt.  Education:  [x] Yes  [] No  [] Reviewed Prior HEP/Ed  Method of Education: [x] Verbal  [x] Demo  [] Written  Comprehension of Education:  [] [employer] understanding. [] Demonstrates understanding. [x] Needs review. [x] Demonstrates/verbalizes HEP/Ed previously given. Plan: [x] Continue current frequency toward long and short term goals.     [x] Specific Instructions for subsequent treatments:    Frequency:  2 x/week for 20 visits      Time In: 5:00pm           Time Out: 6:00pm    Electronically signed by:  Ines Telles PTA

## 2021-07-12 ENCOUNTER — HOSPITAL ENCOUNTER (OUTPATIENT)
Dept: PHYSICAL THERAPY | Facility: CLINIC | Age: 31
Setting detail: THERAPIES SERIES
Discharge: HOME | End: 2021-07-12
Payer: COMMERCIAL

## 2021-07-12 NOTE — FLOWSHEET NOTE
[] [address]  [address]    [phone]  F: [phone]   [] [address]  [facility]   [address]  P: [phone]  F: [phone]  [] [address]  [address] [facility]  P: [phone]  F: [phone] [x] [address]  P: [phone]  F: [phone]  [] [address]  [address] [facility] [address]   [address]   [place]: [phone]  F: [phone]   [] [place] [address] [facility] [address]  [place]: [phone]   F: [phone]     Physical Therapy Cancel/No Show note    Date: 2021  Patient: Tez Quach  : 1990  MRN: [id number]    Cancels/No Shows to date:     For today's appointment patient:    [x]  Cancelled    [] Rescheduled appointment    [] No-show     Reason given by patient:    []  Patient ill    []  Conflicting appointment    [] No transportation      [] Conflict with work    [] No reason given    [] Weather related    [] [id number]    [] Other:      Comments: Patient left voicemail on clinic phone. No reason given for cancellation. Patient confirmed next appointment in voicemail.      [x] Next appointment was confirmed    Electronically signed by: Samuel Ley

## 2021-07-14 ENCOUNTER — PATIENT MESSAGE (OUTPATIENT)
Dept: FAMILY MEDICINE CLINIC | Age: 31
End: 2021-07-14

## 2021-07-14 NOTE — TELEPHONE ENCOUNTER
Spoke to Dr Ba Single office and although he retired another provider will see pt. Office [facility]) states they can not see referral in chart, writer faxed it to office. Writer called pt with update. Pt understands & will contact their office if she does not hear anything within a week.

## 2021-07-14 NOTE — TELEPHONE ENCOUNTER
From: Cathie Givens  To: Suyapa March, APRN - CNP  Sent: 7/14/2021 11:16 AM EDT  Subject: Non-Urgent Medical Question    Hello! Nayla been waiting for my referral for Dr. Aggie Lindsay, they never called so I finally called and he is retired. They gave me their fax number because they also never received it- [phone]    Im having terrible flare with new symptoms so would really appreciate getting into a rheumatologist quickly , also do I need to call for Dexa scan myself?

## 2021-07-15 ENCOUNTER — HOSPITAL ENCOUNTER (OUTPATIENT)
Dept: PHYSICAL THERAPY | Facility: CLINIC | Age: 31
Setting detail: THERAPIES SERIES
Discharge: HOME | End: 2021-07-15
Payer: COMMERCIAL

## 2021-07-15 PROCEDURE — 97140 MANUAL THERAPY 1/> REGIONS: CPT

## 2021-07-15 NOTE — FLOWSHEET NOTE
[] [facility]) [facility] &  Therapy  [address]  [phone]  F: [phone] [] [address]  [employer] [address]   [address]  P: [phone]  F: [phone] [] [address] &  Therapy  [address] [facility] [address]  P: [phone]  F: [phone] [x] [address]  P: [phone]  F: [phone] [] [address]  [facility]   [address]   [place]: [phone]  F: [phone]      Physical Therapy Daily Treatment Note    Date:  7/15/2021  Patient Name:  Oswaldo Astudillo    :  1990  MRN: [id number]  Physician: MICHEAL Haney               Insurance: [employer]  Medical Diagnosis: chronic Kurtis LBP                         Rehab Codes: M54.5  Onset Date: 21 (order written)              Next 's appt.:   Visit# / total visits:     Cancels/No Shows: 0/0    Subjective:    Pain:  [x] Yes  [] No Location: LBP, L hip   Pain Rating: (0-10 scale) 5/10  Pain altered Tx:  [x] No  [] Yes  Action:  Comments: Pt rates herself 5% back to normal.  She is trying to find a new rheumatologist.  Her RA and fibro have flared up and pain is up to 8-9/10. The STM feels good when performed.      Objective:  Modalities: MHP  Precautions: RA,   Exercises:  Exercise Reps/ Time Weight/ Level Issued to HEP Completed Comments   Bike 7' Seat at 1                    Supine            Piriformis stretch  2x30\"    Bilat    BKFO Adductor stretch 2x30\"       DKC 2x30\"       Diaphragmatic breathing 20x    x      Glut sets 10x5\"          Bridges 2x10  2\"  x   Cues to engage TA first; coming down each vertebrae      DLS        PPT 59Q 64\"  x     PPT+ALT UE 2x10       PPT+SLR 3x10  x     PPT+ALT UE/LE 3x10    Knee taps opp UE   Crunches  3x15  x     Sidelying           Ana hip abd 2x10   x   Bilat    Clam shells  2x10  x   Bilat; causes incr pain with band, performed AROM today           Prone            Diaphragmatic breathing 15x          Hip ext 2x10       Bilat; 2 pillows under hips            Gym            Palloff press  2x10 [place]      Other:  Manual  1.  DI to kurtis proximal hip flexors  2. IASTM to kurtis gluts  3. IASTM B lumbar paraspinals    Specific Instructions for next treatment:   1. Continue with manual  2. Add strengthening ex for gluts and TVA, flexibility to hip flexors and adductors    Treatment Charges: Mins Units   []  Modalities     []  Ther Exercise 43 3   [x]  Manual Therapy     []  Ther Activities     []  Aquatics     []  Vasocompression     []  Other     Total Treatment time 43 3       Assessment: [x] Progressing toward goals. Pt presents today being exacerbated. Performed manual part of the treatment as she is doing her HEP     [] No change. [] Other:  [x] Patient would continue to benefit from skilled physical therapy services in order to: decrease spasm of hip flexors and piriformis, improve flexibility of hip flexors, improve strength of gluts and TVA. STG/LTG  STG: (to be met in 10treatments)  1. ? Pain:<3/10 in LB  2. ? ROM: increase hip ext to 10 deg  3. ? Strength: at least 4/5 with hips  4. ? Function:Modified Oswestry to <20% interference  5. Patient to be independent with home exercise program as demonstrated by performance with correct form without cues.     LTG: (to be met in 20 treatments)  1. Pain <2/10  2. Modified Oswestry to <10% interference   3. Minimal tenderness on Kurtis hip flexors and gluts      Patient goals: \"ways to relive pain, strengthen muscle in back and core\"    Pt. Education:  [x] Yes  [] No  [] Reviewed Prior HEP/Ed  Method of Education: [x] Verbal  [x] Demo  [] Written  Comprehension of Education:  [] Verbalizes understanding. [] Demonstrates understanding. [x] Needs review.   [x] Demonstrates/verbalizes HEP/Ed previously given. Plan: [x] Continue current frequency toward long and short term goals.     [x] Specific Instructions for subsequent treatments:    Frequency:  2 x/week for 20 visits      Time In: 6015           Time Out: 7735    Electronically signed by:  Peggy Juárez PT

## 2021-07-16 ENCOUNTER — TELEPHONE (OUTPATIENT)
Dept: FAMILY MEDICINE CLINIC | Age: 31
End: 2021-07-16

## 2021-07-16 NOTE — TELEPHONE ENCOUNTER
Not sure which office this is pertaining to. If it is the rheumatology office. Patient needs to verify with her insurance what rheumatologist will be accepted by her insurance.      Ban Cullen APRN-CNP

## 2021-07-16 NOTE — TELEPHONE ENCOUNTER
Received notification that the patient's insurance is not being accepted at this time in their office.  Please advise

## 2021-09-09 ENCOUNTER — OFFICE VISIT (OUTPATIENT)
Dept: FAMILY MEDICINE CLINIC | Age: 31
End: 2021-09-09
Payer: COMMERCIAL

## 2021-09-09 ENCOUNTER — HOSPITAL ENCOUNTER (OUTPATIENT)
Age: 31
Discharge: HOME | End: 2021-09-09
Payer: COMMERCIAL

## 2021-09-09 VITALS
OXYGEN SATURATION: 98 % | TEMPERATURE: 97.8 F | BODY MASS INDEX: 34.93 KG/M2 | HEART RATE: 76 BPM | WEIGHT: 191 LBS | DIASTOLIC BLOOD PRESSURE: 60 MMHG | SYSTOLIC BLOOD PRESSURE: 100 MMHG

## 2021-09-09 DIAGNOSIS — R20.2 COMPLAINT OF PARESTHESIA: ICD-10-CM

## 2021-09-09 DIAGNOSIS — M54.40 CHRONIC BILATERAL LOW BACK PAIN WITH SCIATICA, SCIATICA LATERALITY UNSPECIFIED: Primary | ICD-10-CM

## 2021-09-09 DIAGNOSIS — M79.10 MYALGIA: ICD-10-CM

## 2021-09-09 DIAGNOSIS — G89.29 CHRONIC BILATERAL LOW BACK PAIN WITH SCIATICA, SCIATICA LATERALITY UNSPECIFIED: Primary | ICD-10-CM

## 2021-09-09 LAB
ABSOLUTE EOS #: 0.08 K/UL (ref 0–0.44)
ABSOLUTE IMMATURE GRANULOCYTE: 0.03 K/UL (ref 0–0.3)
ABSOLUTE LYMPH #: 1.79 K/UL (ref 1.1–3.7)
ABSOLUTE MONO #: 0.38 K/UL (ref 0.1–1.2)
BASOPHILS # BLD: 1 % (ref 0–2)
BASOPHILS ABSOLUTE: 0.04 K/UL (ref 0–0.2)
C-REACTIVE PROTEIN: 5.2 MG/L (ref 0–5)
DIFFERENTIAL TYPE: NORMAL
EOSINOPHILS RELATIVE PERCENT: 1 % (ref 1–4)
HCT VFR BLD CALC: 41.8 % (ref 36.3–47.1)
HEMOGLOBIN: 13.5 G/DL (ref 11.9–15.1)
IMMATURE GRANULOCYTES: 0 %
LYMPHOCYTES # BLD: 27 % (ref 24–43)
MCH RBC QN AUTO: 28.7 PG (ref 25.2–33.5)
MCHC RBC AUTO-ENTMCNC: 32.3 G/DL (ref 28.4–34.8)
MCV RBC AUTO: 88.9 FL (ref 82.6–102.9)
MONOCYTES # BLD: 6 % (ref 3–12)
NRBC AUTOMATED: 0 PER 100 WBC
PDW BLD-RTO: 12.7 % (ref 11.8–14.4)
PLATELET # BLD: 250 K/UL (ref 138–453)
PLATELET ESTIMATE: NORMAL
PMV BLD AUTO: 9.7 FL (ref 8.1–13.5)
RBC # BLD: 4.7 M/UL (ref 3.95–5.11)
RBC # BLD: NORMAL 10*6/UL
RHEUMATOID FACTOR: <10 IU/ML
SEDIMENTATION RATE, ERYTHROCYTE: 17 MM (ref 0–20)
SEG NEUTROPHILS: 65 % (ref 36–65)
SEGMENTED NEUTROPHILS ABSOLUTE COUNT: 4.41 K/UL (ref 1.5–8.1)
WBC # BLD: 6.7 K/UL (ref 3.5–11.3)
WBC # BLD: NORMAL 10*3/UL

## 2021-09-09 PROCEDURE — 86235 NUCLEAR ANTIGEN ANTIBODY: CPT

## 2021-09-09 PROCEDURE — 86431 RHEUMATOID FACTOR QUANT: CPT

## 2021-09-09 PROCEDURE — 4004F PT TOBACCO SCREEN RCVD TLK: CPT | Performed by: NURSE PRACTITIONER

## 2021-09-09 PROCEDURE — 86038 ANTINUCLEAR ANTIBODIES: CPT

## 2021-09-09 PROCEDURE — 86140 C-REACTIVE PROTEIN: CPT

## 2021-09-09 PROCEDURE — 99214 OFFICE O/P EST MOD 30 MIN: CPT | Performed by: NURSE PRACTITIONER

## 2021-09-09 PROCEDURE — 86225 DNA ANTIBODY NATIVE: CPT

## 2021-09-09 PROCEDURE — G8427 DOCREV CUR MEDS BY ELIG CLIN: HCPCS | Performed by: NURSE PRACTITIONER

## 2021-09-09 PROCEDURE — 36415 COLL VENOUS BLD VENIPUNCTURE: CPT

## 2021-09-09 PROCEDURE — G8417 CALC BMI ABV UP PARAM F/U: HCPCS | Performed by: NURSE PRACTITIONER

## 2021-09-09 PROCEDURE — 85652 RBC SED RATE AUTOMATED: CPT

## 2021-09-09 PROCEDURE — 85025 COMPLETE CBC W/AUTO DIFF WBC: CPT

## 2021-09-09 RX ORDER — GABAPENTIN 100 MG/1
100 CAPSULE ORAL 2 TIMES DAILY
Qty: 60 CAPSULE | Refills: 1 | Status: SHIPPED | OUTPATIENT
Start: 2021-09-09 | End: 2021-11-15 | Stop reason: SDUPTHER

## 2021-09-09 RX ORDER — NAPROXEN 500 MG/1
500 TABLET ORAL 2 TIMES DAILY WITH MEALS
Qty: 60 TABLET | Refills: 5 | Status: SHIPPED | OUTPATIENT
Start: 2021-09-09

## 2021-09-09 ASSESSMENT — ENCOUNTER SYMPTOMS
SORE THROAT: 0
CONSTIPATION: 0
RHINORRHEA: 0
DIARRHEA: 0
SHORTNESS OF BREATH: 0
COUGH: 0

## 2021-09-09 NOTE — PROGRESS NOTES
[address]   [address]   [phone]    2021     CHIEF COMPLAINT:     Fidencio Aguero (:  1990) is a 27 y.o. female, here for evaluation of the following chief complaint(s):  Lower Back Pain (Post PT)      REVIEWED INFORMATION      Allergies   Allergen Reactions   Gale Bame [Tofacitinib] Anaphylaxis and Other (See Comments)     gastritis    Ceclor [Cefaclor] Hives    Cephalosporins      hives       Current Outpatient Medications   Medication Sig Dispense Refill    gabapentin (NEURONTIN) 100 MG capsule Take 1 capsule by mouth 2 times daily for 30 days. 60 capsule 1    naproxen (NAPROSYN) 500 MG tablet Take 1 tablet by mouth 2 times daily (with meals) 60 tablet 5    lansoprazole (PREVACID) 30 MG delayed release capsule Take 1 capsule by mouth daily 90 capsule 1    pantoprazole (PROTONIX) 40 MG tablet Take 1 tablet by mouth 2 times daily (before meals) 60 tablet 1    diclofenac sodium (VOLTAREN) 1 % GEL Apply 2 g topically 4 times daily as needed for Pain 350 g 3    Multiple Vitamin (MULTI-VITAMIN DAILY PO) Take by mouth      vitamin B-12 (CYANOCOBALAMIN) 100 MCG tablet Take 50 mcg by mouth daily      gabapentin (NEURONTIN) 300 MG capsule Take 1 capsule by mouth 3 times daily for 30 days. (Patient taking differently: Take 300 mg by mouth 3 times daily. 300 mg Nightly) 90 capsule 1    albuterol sulfate HFA (PROAIR HFA) 108 (90 Base) MCG/ACT inhaler Inhale 2 puffs into the lungs every 6 hours as needed for Wheezing 3 Inhaler 3     No current facility-administered medications for this visit.         Patient Care Team:  VERONICA Reis CNP as PCP - General (Nurse Practitioner)  VERONICA Reis CNP [facility]CP [facility]  Jose Santiago MD as Consulting Physician (Rheumatology)  Gabbi Ken as Consulting Physician (Obstetrics & Gynecology)  Lo Echavarria MD as Surgeon (General Surgery)    REVIEW OF SYSTEMS: Review of Systems   Constitutional: Negative for activity change, fatigue and unexpected weight change. HENT: Positive for ear pain. Negative for congestion, hearing loss, rhinorrhea and sore throat. Eyes:        History of Uveitis with dry eye      Respiratory: Negative for cough and shortness of breath. Cardiovascular: Negative for chest pain, palpitations and leg swelling. Gastrointestinal: Negative for constipation and diarrhea. Endocrine:        Dry mouth -      Genitourinary:        New onset vaginal dryness     Musculoskeletal: Negative for arthralgias and gait problem. Thoracic spasms - feels as if there is a vice  around chest wall      Neurological: Positive for weakness. Negative for dizziness and headaches. Parathesia - \"feel like spiders are climbing down arms \"    Psychiatric/Behavioral: Negative for confusion. The patient is not nervous/anxious. HISTORY OF PRESENT ILLNESS     Patient returns today with increasing back pain and spasms of there thoracic spine. She notes pain has gotten significantly worse in the last few weeks. Reports a \"band-like\" sensation around her rib cage at times. Reports at times she feels as if she has spiders crawling up her arms. PHYSICAL EXAM:     /60   Pulse 76   Temp 97.8 °F (36.6 °C) (Temporal)   Wt 191 lb (86.6 kg)   SpO2 98%   BMI 34.93 kg/m²      Physical Exam  Constitutional:       Appearance: Normal appearance. She is well-developed. She is not ill-appearing. Eyes:      General:         Right eye: No discharge. Left eye: No discharge. Extraocular Movements: Extraocular movements intact. Conjunctiva/sclera: Conjunctivae normal.   Neck:      Thyroid: No thyroid mass. Vascular: No JVD. Pulmonary:      Effort: Pulmonary effort is normal. No respiratory distress. Musculoskeletal:      Right shoulder: No deformity. Normal range of motion. Left shoulder: No deformity.  Normal range of motion. Cervical back: Normal range of motion. Skin:     General: Skin is moist.      Coloration: Skin is not cyanotic or jaundiced. Findings: No rash. Neurological:      General: No focal deficit present. Mental Status: She is alert and oriented to person, place, and time. Gait: Gait is intact. Psychiatric:         Attention and Perception: Attention normal. She does not perceive auditory or visual hallucinations. Mood and Affect: Mood normal.         Speech: Speech normal.          PROCEDURE/ IN OFFICE TESTING/ LAB REVIEW     No in office testing or procedures completed during today's office visit. ASSESSMENT/PLAN/ FOLLOWUP:     PLEASE NOTE THAT ANY DISCONTINUATION OF MEDICATIONS OR MEDICAL SUPPLIES REFLECTED IN TODAY'S VISIT SUMMARY  MAY NOT HAVE COMPLETED AS A CHANGE IN YOUR PLAN OF CARE. THESE CHANGES MAY HAVE ONLY BEEN DONE SO IN ORDER TO CLEAN UP LIST FROM DUPLICATIONS OR MISCELLANEOUS SUPPLIES ONLY NEEDED PERIODIC REORDERS. DO NOT DISCONTINUE MEDICATIONS LISTED UNLESS SPECIFICALLY DISCUSSED IN YOUR APPOINTMENT WITH PROVIDER OR SPECIALIST, IF YOU HAVE AN QUESTIONS, PLEASE CONTACT YOUR PROVIDER FOR CLARIFICATION IF NOT ADDRESSED IN YOUR PLAN OF CARE. 1. Chronic bilateral low back pain with sciatica, sciatica laterality unspecified  -     Poonam Abdul, [facility], Neurosurgery, [place]  2. Complaint of paresthesia  -     gabapentin (NEURONTIN) 100 MG capsule; Take 1 capsule by mouth 2 times daily for 30 days. , Disp-60 capsule, R-1Normal  3. Myalgia  -     FATMATA Screen With Reflex; Future  -     Rheumatoid Factor; Future  -     Sedimentation Rate; Future  -     C-Reactive Protein; Future  -     CBC Auto Differential; Future  -     Sjogrens Syndrome-A Extractable Nuclear Antibody; Future  -     Sjogrens Syndrome-B Extractable Nuclear Antibody; Future  -     naproxen (NAPROSYN) 500 MG tablet;  Take 1 tablet by mouth 2 times daily (with meals), Disp-60 tablet, R-5Normal      Return in about 3 months (around 12/9/2021) for evaluate new medication started. COMMUNICATION:       On this date 9/9/2021 I have spent 30 minutes reviewing previous notes, test results and face to face with the patient discussing the diagnosis and importance of compliance with the treatment plan as well as documenting on the day of the visit. The best way to find yourself is to lose yourself in the service of others - [address] [place] [address]. [address] [place] [address]   [email or web address]  Office: [phone]     An electronic signature was used to authenticate this note.   Signed by VERONICA Reyes CNP, APRN-CNP on 9/19/2021 at 6:19 PM

## 2021-09-09 NOTE — PATIENT INSTRUCTIONS
PLEASE NOTE THAT ANY DISCONTINUATION OF MEDICATIONS OR MEDICAL SUPPLIES REFLECTED IN TODAY'S VISIT SUMMARY  MAY NOT HAVE COMPLETED AS A CHANGE IN YOUR PLAN OF CARE. THESE CHANGES MAY HAVE ONLY BEEN DONE SO IN ORDER TO CLEAN UP LIST FROM DUPLICATIONS OR MISCELLANEOUS SUPPLIES ONLY NEEDED PERIODIC REORDERS. DO NOT DISCONTINUE MEDICATIONS LISTED UNLESS SPECIFICALLY DISCUSSED IN YOUR APPOINTMENT WITH PROVIDER OR SPECIALIST, IF YOU HAVE AN QUESTIONS, PLEASE CONTACT YOUR PROVIDER FOR CLARIFICATION IF NOT ADDRESSED IN YOUR PLAN OF CARE. It was my pleasure to meet with you today. Please contact me with any questions or concerns, and please notify myself or our manager if there is anyway we can improve our service in your health care needs.  Below I have listed some instructions and information that pertain to today's visit.    -You have been advised to continue all current medication, otherwise not discussed in today's visit  -New medications and refills will been sent and made available at pharmacy or mail away  -Heathy daily diet to include low salt, low fat heart healthy and low carbohydrate, low sugar diabetic diet  -Drink 6-8 glasses of water daily

## 2021-09-13 LAB
ANTI DNA DOUBLE STRANDED: <0.5 IU/ML
ANTI SSA: <0.3 U/ML
ANTI SSB: <0.3 U/ML
ANTI-NUCLEAR ANTIBODY (ANA): NEGATIVE
ENA ANTIBODIES SCREEN: 0.2 U/ML

## 2021-09-27 DIAGNOSIS — R20.2 COMPLAINT OF PARESTHESIA: ICD-10-CM

## 2021-09-27 DIAGNOSIS — R53.83 FATIGUE, UNSPECIFIED TYPE: Primary | ICD-10-CM

## 2021-10-15 ENCOUNTER — HOSPITAL ENCOUNTER (OUTPATIENT)
Age: 31
Discharge: HOME | End: 2021-10-15
Payer: COMMERCIAL

## 2021-10-15 DIAGNOSIS — R20.2 COMPLAINT OF PARESTHESIA: ICD-10-CM

## 2021-10-15 DIAGNOSIS — R53.83 FATIGUE, UNSPECIFIED TYPE: ICD-10-CM

## 2021-10-15 PROCEDURE — 86800 THYROGLOBULIN ANTIBODY: CPT

## 2021-10-15 PROCEDURE — 84443 ASSAY THYROID STIM HORMONE: CPT

## 2021-10-15 PROCEDURE — 84436 ASSAY OF TOTAL THYROXINE: CPT

## 2021-10-15 PROCEDURE — 83970 ASSAY OF PARATHORMONE: CPT

## 2021-10-15 PROCEDURE — 86376 MICROSOMAL ANTIBODY EACH: CPT

## 2021-10-15 PROCEDURE — 84480 ASSAY TRIIODOTHYRONINE (T3): CPT

## 2021-10-15 PROCEDURE — 36415 COLL VENOUS BLD VENIPUNCTURE: CPT

## 2021-10-16 LAB
PTH INTACT: 34.95 PG/ML (ref 15–65)
T3 TOTAL: 104 NG/DL (ref 60–181)
TSH SERPL DL<=0.05 MIU/L-ACNC: 1.16 MIU/L (ref 0.3–5)

## 2021-10-17 LAB — T4 TOTAL: 6.2 UG/DL (ref 4.5–10.9)

## 2021-10-20 LAB
THYROGLOBULIN AB: <12 IU/ML (ref 0–40)
THYROID PEROXIDASE (TPO) AB: <4 IU/ML (ref 0–25)

## 2021-10-21 ENCOUNTER — HOSPITAL ENCOUNTER (OUTPATIENT)
Dept: GENERAL RADIOLOGY | Age: 31
Discharge: HOME | End: 2021-10-23
Payer: COMMERCIAL

## 2021-10-21 ENCOUNTER — OFFICE VISIT (OUTPATIENT)
Dept: NEUROSURGERY | Age: 31
End: 2021-10-21
Payer: COMMERCIAL

## 2021-10-21 ENCOUNTER — HOSPITAL ENCOUNTER (OUTPATIENT)
Age: 31
Discharge: HOME | End: 2021-10-23
Payer: COMMERCIAL

## 2021-10-21 VITALS
SYSTOLIC BLOOD PRESSURE: 135 MMHG | WEIGHT: 190.3 LBS | BODY MASS INDEX: 34.81 KG/M2 | HEART RATE: 91 BPM | DIASTOLIC BLOOD PRESSURE: 85 MMHG | RESPIRATION RATE: 16 BRPM

## 2021-10-21 DIAGNOSIS — M51.26 PROTRUSION OF LUMBAR INTERVERTEBRAL DISC: ICD-10-CM

## 2021-10-21 DIAGNOSIS — M25.552 CHRONIC LEFT HIP PAIN: ICD-10-CM

## 2021-10-21 DIAGNOSIS — M53.3 SACROILIAC PAIN: ICD-10-CM

## 2021-10-21 DIAGNOSIS — R51.9 CHRONIC DAILY HEADACHE: ICD-10-CM

## 2021-10-21 DIAGNOSIS — G89.29 CHRONIC LEFT HIP PAIN: ICD-10-CM

## 2021-10-21 DIAGNOSIS — M25.552 CHRONIC LEFT HIP PAIN: Primary | ICD-10-CM

## 2021-10-21 DIAGNOSIS — G89.29 CHRONIC LEFT HIP PAIN: Primary | ICD-10-CM

## 2021-10-21 PROCEDURE — G8427 DOCREV CUR MEDS BY ELIG CLIN: HCPCS | Performed by: NURSE PRACTITIONER

## 2021-10-21 PROCEDURE — 99214 OFFICE O/P EST MOD 30 MIN: CPT | Performed by: NURSE PRACTITIONER

## 2021-10-21 PROCEDURE — G8484 FLU IMMUNIZE NO ADMIN: HCPCS | Performed by: NURSE PRACTITIONER

## 2021-10-21 PROCEDURE — G8417 CALC BMI ABV UP PARAM F/U: HCPCS | Performed by: NURSE PRACTITIONER

## 2021-10-21 PROCEDURE — 4004F PT TOBACCO SCREEN RCVD TLK: CPT | Performed by: NURSE PRACTITIONER

## 2021-10-21 PROCEDURE — 73523 X-RAY EXAM HIPS BI 5/> VIEWS: CPT

## 2021-10-21 PROCEDURE — 73521 X-RAY EXAM HIPS BI 2 VIEWS: CPT

## 2021-10-21 NOTE — PROGRESS NOTES
[facility]  [facility]   [address] [facility] [address] [facility] [address]  Dept: [phone]    Patient:  Wade Conrad  YOB: 1990  Date: 10/21/21    The patient is a 27 y.o. female who presents today for consult of the following problems:     Chief Complaint   Patient presents with    New Patient    Back Pain         HPI:     Wade Conrad is a 27 y.o. female on whom neurosurgical consultation was requested by VERONICA Mcclure CNP for management of back and leg pain. Pt has had issues with back and hip pain since 2013. Hip pain was the initial issue, subsequently back pain following complicated pregnancy course. Pain is 6/10 on average, described as aching, with intermittent sharp. Has completed multimodal physical therapy, including cupping, dry needling, stretching. Has been following with pain management, did undergo bilateral MBB and SI injection with no relief. Does have a history of juvenile rheumatoid arthritis. Additionally has been experiencing issues with chronic, severe, daily headaches since the birth of her child approximately 5 years ago. Groin pain: Yes  Saddle anesthesia: No  Hip Pain: Yes  Bowel/bladder incontinence: No  Constipation or Urinary retention: No    LIMITATIONS    Pain significantly limiting from a daily standpoint. Assistive devices: None  Daily pharmacologic pain control include: NSAID, gabapentin  Back versus leg: Mostly back    MANAGEMENT     Prior Surgery: No  Prior to 1yr ago:   In the last year:    Physical Therapy: Yes   Chiropractic Interventions: No   Injections: Yes  Improvement: None    Types of injections/responses: None    History:     Past Medical History:   Diagnosis Date    Chronic sinus infection     Cluster headache     History of sepsis 09/2016    post C-Birth/partial    HSP ([facility] Schonlein purpura) [facility]     Juvenile rheumatoid arthritis ([facility]) 1991    Dr. Trini Pak traumatic stress disorder (PTSD)     Uveitis 1991     Past Surgical History:   Procedure Laterality Date    BACK INJECTION Bilateral 3/5/2021    SACROILIAC JOINT INJECTION performed by Yocasta Tan MD at [address]  09/2016    CHOLECYSTECTOMY      EYE SURGERY Left 2009    cataract with lens implant    HYSTERECTOMY  09/2016    partial    KNEE ARTHROSCOPY Right     X's 3 for meniscal tears    NERVE BLOCK Bilateral 03/05/2021     SACROILIAC JOINT INJECTION (Bilateral )    PAIN MANAGEMENT PROCEDURE Bilateral 02/12/2021    NERVE BLOCK BILATERAL -MBB #1 L4-5, L5-S1 - Bilateral    PAIN MANAGEMENT PROCEDURE Bilateral 2/12/2021    NERVE BLOCK BILATERAL -MBB #1 L4-5, L5-S1 performed by Yocasta Tan MD at [address] [facility]  03/05/2021    SACROILIAC JOINT INJECTION - Bilateral    [place] ERCP DX COLLECTION SPECIMEN BRUSHING/WASHING N/A 3/14/2018    ERCP ENDOSCOPIC RETROGRADE CHOLANGIOPANCREATOGRAPHY WITH BALLOON SWEEP performed by Bushra Mcconnell MD at [address] N/A 3/16/2018    LAPAROSCOPIC ROBOTIC ASSISTED MULTIPORT CHOLECYSTECTOMY performed by Chelsie Cruz MD at [address]       Family History   Problem Relation Age of Onset    High Blood Pressure Mother     Breast Cancer Maternal Aunt     Diabetes Maternal Grandmother     Stroke Maternal Grandfather     Prostate Cancer Paternal Grandfather      Current Outpatient Medications on File Prior to Visit   Medication Sig Dispense Refill    naproxen (NAPROSYN) 500 MG tablet Take 1 tablet by mouth 2 times daily (with meals) 60 tablet 5    lansoprazole (PREVACID) 30 MG delayed release capsule Take 1 capsule by mouth daily 90 capsule 1    pantoprazole (PROTONIX) 40 MG tablet Take 1 tablet by mouth 2 times daily (before meals) 60 tablet 1    diclofenac sodium (VOLTAREN) 1 % GEL Apply 2 g topically 4 times daily as needed for Pain 350 g 3    Multiple Vitamin (MULTI-VITAMIN DAILY PO) Take by mouth      vitamin B-12 (CYANOCOBALAMIN) 100 MCG tablet Take 50 mcg by mouth daily      gabapentin (NEURONTIN) 300 MG capsule Take 1 capsule by mouth 3 times daily for 30 days. (Patient taking differently: Take 300 mg by mouth 3 times daily. 300 mg Nightly) 90 capsule 1    albuterol sulfate HFA (PROAIR HFA) 108 (90 Base) MCG/ACT inhaler Inhale 2 puffs into the lungs every 6 hours as needed for Wheezing 3 Inhaler 3    gabapentin (NEURONTIN) 100 MG capsule Take 1 capsule by mouth 2 times daily for 30 days. 60 capsule 1     No current facility-administered medications on file prior to visit. Social History     Tobacco Use    Smoking status: Current Every Day Smoker     Packs/day: 0.25     Types: Cigarettes     Start date: 5/15/2014     Last attempt to quit: 3/5/2018     Years since quitting: 3.6    Smokeless tobacco: Never Used   Substance Use Topics    Alcohol use: Yes     Comment: socially    Drug use: Yes     Types: Marijuana     Comment: for symptom relief       Allergies   Allergen Reactions   Georgette Dial [Tofacitinib] Anaphylaxis and Other (See Comments)     gastritis    Ceclor [Cefaclor] Hives    Cephalosporins      hives       Review of Systems  Constitutional: Negative for activity change and appetite change. HENT: Negative for ear pain and facial swelling. Eyes: Negative for discharge and itching. Respiratory: Negative for choking and chest tightness. Cardiovascular: Negative for chest pain and leg swelling. Gastrointestinal: Negative for nausea and abdominal pain. Endocrine: Negative for cold intolerance and heat intolerance. Genitourinary: Negative for frequency and flank pain. Musculoskeletal: Negative for myalgias and joint swelling. Skin: Negative for rash and wound. Allergic/Immunologic: Negative for environmental allergies and food allergies. Hematological: Negative for adenopathy. Does not bruise/bleed easily. Psychiatric/Behavioral: Negative for self-injury. The patient is not nervous/anxious. Physical Exam:      /85   Pulse 91   Resp 16   Wt 190 lb 4.8 oz (86.3 kg)   BMI 34.81 kg/m²   Estimated body mass index is 34.81 kg/m² as calculated from the following:    Height as of 4/15/21: 5' 2\" (1.575 m). Weight as of this encounter: 190 lb 4.8 oz (86.3 kg). General:  Vicente Nayak is a 27y.o. year old female who appears her stated age. HEENT: Normocephalic atraumatic. Neck supple. Chest: regular rate; pulses equal  Abdomen: Soft nontender nondistended. Ext: DP and PT pulses 2+, good cap refill  Neuro    Mentation  Appropriate affect  Registration intact  Orientation intact    Cranial Nerves:   Pupils equal and reactive to light  Extraocular motion intact  Face and shrug symmetric  Tongue midline  No dysarthria  v1-3 sensation symmetric, masseter tone symmetric  Hearing symmetric and intact    Sensation: Intact    Motor  L deltoid 5/5; R deltoid 5/5  L biceps 5/5; R biceps 5/5  L triceps 5/5; R triceps 5/5  L wrist extension 5/5; R wrist extension 5/5  L intrinsics 5/5; R intrinsics 5/5     L iliopsoas 5/5 , R iliopsoas 5/5  L quadriceps 5/5; R quadriceps 5/5  L Dorsiflexion 5/5; R dorsiflexion 5/5  L Plantarflexion 5/5; R plantarflexion 5/5  L EHL 5/5; R EHL 5/5    Reflexes  L Brachioradialis 2+/4; R brachioradialis 2+/4  L Biceps 2+/4; R Biceps 2+/4  L Triceps 2+/4; R Triceps 2+/4  L Patellar 2+/4: R Patellar 2+/4  L Achilles 2+/4; R Achilles 2+/4    hoffmans L: neg  hoffmans R: neg  Clonus L: neg  Clonus R: neg  Babinski L: neg  Babinski R: neg    AUTUMN: Negative  Straight leg raise: Negative  SI joint tenderness: Positive    + Tenderness to palpation over bilateral greater trochanter, left worse than right    Studies Review:     MRI lumbar spine 12/30/2020 (which is reviewed): FINDINGS:   BONES/ALIGNMENT: No acute fracture.  No subluxation.  No suspicious bone   marrow replacing lesion.

## 2021-11-15 DIAGNOSIS — R20.2 COMPLAINT OF PARESTHESIA: ICD-10-CM

## 2021-11-15 RX ORDER — GABAPENTIN 100 MG/1
100 CAPSULE ORAL 2 TIMES DAILY
Qty: 60 CAPSULE | Refills: 1 | Status: SHIPPED | OUTPATIENT
Start: 2021-11-15 | End: 2022-01-04 | Stop reason: SDUPTHER

## 2021-11-15 NOTE — TELEPHONE ENCOUNTER
Last visit: 9/9/21  Last Med refill:9/9/21  Does patient have enough medication for 72 hours: Yes    Next Visit Date:  Future Appointments   Date Time Provider Miryam Richardson   12/14/2021  3:00 PM VERONICA Lomas CNP [facility] AND [facility] [address] Maintenance   Topic Date Due    Flu vaccine (1) 09/01/2021    Pneumococcal 0-64 years Vaccine (1 of 2 - PPSV23) 12/01/2021 (Originally 3/9/1995)    COVID-19 Vaccine (1) 12/01/2021 (Originally 3/9/2001)    DTaP/Tdap/Td vaccine (2 - Td or Tdap) 08/15/2026    Hepatitis C screen  Completed    HIV screen  Completed    Hepatitis A vaccine  Aged Out    Hepatitis B vaccine  Aged Out    Hib vaccine  Aged Out    Meningococcal (ACWY) vaccine  Aged Out    Varicella vaccine  Discontinued       Hemoglobin A1C (%)   Date Value   04/15/2021 4.9   07/02/2018 4.8   10/19/2017 4.8             ( goal A1C is < 7)   No results found for: LABMICR  LDL Cholesterol (mg/dL)   Date Value   04/15/2021 108   10/19/2017 87       (goal LDL is <100)   AST (U/L)   Date Value   04/15/2021 21     ALT (U/L)   Date Value   04/15/2021 24     BUN (mg/dL)   Date Value   04/15/2021 13     BP Readings from Last 3 Encounters:   10/21/21 135/85   09/09/21 100/60   05/26/21 108/64          (goal 120/80)    All Future Testing planned in [facility]  Lab Frequency Next Occurrence   MRI BRAIN WO CONTRAST Once 12/04/2021   COVID-19 Once 02/05/2021   COVID-19 Once 02/22/2021   HM DEXA SCAN Once 05/26/2022               Patient Active Problem List:     Juvenile rheumatoid arthritis ([address]     Post traumatic stress disorder (PTSD)     History of sepsis     Hyperglycemia     Chronic bilateral low back pain with sciatica     Lumbar disc disease     Chronic nonintractable headache     Depression     Anxiety

## 2021-11-29 ENCOUNTER — PATIENT MESSAGE (OUTPATIENT)
Dept: FAMILY MEDICINE CLINIC | Age: 31
End: 2021-11-29

## 2021-11-30 DIAGNOSIS — M79.89 SOFT TISSUE MASS: Primary | ICD-10-CM

## 2021-11-30 NOTE — TELEPHONE ENCOUNTER
From: Meet Christiansen  To: Joann Walker  Sent: 11/29/2021 8:14 PM EST  Subject: Non-Urgent Medical Question    Hello-     So a couple weeks ago I noticed a lump under my skin on my rib/abdomen, and have noticed more and more. I cant see them only feel them. I have an appointment on the 14th, but wanted to let you know because I will definitely want to discuss it in our next visit, Im a little concerned, but I also have an visible lump in my forearm that wasnt a cause for concern previously.      Thanks

## 2021-12-07 ENCOUNTER — HOSPITAL ENCOUNTER (OUTPATIENT)
Age: 31
Setting detail: SPECIMEN
Discharge: HOME | End: 2021-12-07

## 2021-12-07 DIAGNOSIS — M79.89 SOFT TISSUE MASS: ICD-10-CM

## 2021-12-07 LAB
ABSOLUTE EOS #: 0.17 K/UL (ref 0–0.44)
ABSOLUTE IMMATURE GRANULOCYTE: <0.03 K/UL (ref 0–0.3)
ABSOLUTE LYMPH #: 2.08 K/UL (ref 1.1–3.7)
ABSOLUTE MONO #: 0.47 K/UL (ref 0.1–1.2)
ALBUMIN SERPL-MCNC: 4.6 G/DL (ref 3.5–5.2)
ALBUMIN/GLOBULIN RATIO: 1.7 (ref 1–2.5)
ALP BLD-CCNC: 110 U/L (ref 35–104)
ALT SERPL-CCNC: 19 U/L (ref 5–33)
ANION GAP SERPL CALCULATED.3IONS-SCNC: 11 MMOL/L (ref 9–17)
AST SERPL-CCNC: 16 U/L
BASOPHILS # BLD: 1 % (ref 0–2)
BASOPHILS ABSOLUTE: 0.04 K/UL (ref 0–0.2)
BILIRUB SERPL-MCNC: 0.23 MG/DL (ref 0.3–1.2)
BUN BLDV-MCNC: 15 MG/DL (ref 6–20)
BUN/CREAT BLD: ABNORMAL (ref 9–20)
C-REACTIVE PROTEIN: 4.8 MG/L (ref 0–5)
CALCIUM SERPL-MCNC: 9.5 MG/DL (ref 8.6–10.4)
CHLORIDE BLD-SCNC: 104 MMOL/L (ref 98–107)
CO2: 24 MMOL/L (ref 20–31)
CREAT SERPL-MCNC: 0.98 MG/DL (ref 0.5–0.9)
DIFFERENTIAL TYPE: NORMAL
EOSINOPHILS RELATIVE PERCENT: 2 % (ref 1–4)
GFR AFRICAN AMERICAN: >60 ML/MIN
GFR NON-AFRICAN AMERICAN: >60 ML/MIN
GFR SERPL CREATININE-BSD FRML MDRD: ABNORMAL ML/MIN/{1.73_M2}
GFR SERPL CREATININE-BSD FRML MDRD: ABNORMAL ML/MIN/{1.73_M2}
GLUCOSE BLD-MCNC: 84 MG/DL (ref 70–99)
HCT VFR BLD CALC: 40.1 % (ref 36.3–47.1)
HEMOGLOBIN: 13.2 G/DL (ref 11.9–15.1)
IMMATURE GRANULOCYTES: 0 %
LYMPHOCYTES # BLD: 26 % (ref 24–43)
MCH RBC QN AUTO: 28.6 PG (ref 25.2–33.5)
MCHC RBC AUTO-ENTMCNC: 32.9 G/DL (ref 28.4–34.8)
MCV RBC AUTO: 87 FL (ref 82.6–102.9)
MONOCYTES # BLD: 6 % (ref 3–12)
NRBC AUTOMATED: 0 PER 100 WBC
PDW BLD-RTO: 12.8 % (ref 11.8–14.4)
PLATELET # BLD: 247 K/UL (ref 138–453)
PLATELET ESTIMATE: NORMAL
PMV BLD AUTO: 9.7 FL (ref 8.1–13.5)
POTASSIUM SERPL-SCNC: 3.8 MMOL/L (ref 3.7–5.3)
RBC # BLD: 4.61 M/UL (ref 3.95–5.11)
RBC # BLD: NORMAL 10*6/UL
SEDIMENTATION RATE, ERYTHROCYTE: 6 MM (ref 0–20)
SEG NEUTROPHILS: 65 % (ref 36–65)
SEGMENTED NEUTROPHILS ABSOLUTE COUNT: 5.25 K/UL (ref 1.5–8.1)
SODIUM BLD-SCNC: 139 MMOL/L (ref 135–144)
TOTAL PROTEIN: 7.3 G/DL (ref 6.4–8.3)
WBC # BLD: 8 K/UL (ref 3.5–11.3)
WBC # BLD: NORMAL 10*3/UL

## 2022-01-04 ENCOUNTER — OFFICE VISIT (OUTPATIENT)
Dept: FAMILY MEDICINE CLINIC | Age: 32
End: 2022-01-04
Payer: COMMERCIAL

## 2022-01-04 VITALS
WEIGHT: 187 LBS | SYSTOLIC BLOOD PRESSURE: 136 MMHG | OXYGEN SATURATION: 98 % | HEART RATE: 95 BPM | TEMPERATURE: 99.3 F | BODY MASS INDEX: 34.2 KG/M2 | DIASTOLIC BLOOD PRESSURE: 88 MMHG

## 2022-01-04 DIAGNOSIS — F41.9 ANXIETY: ICD-10-CM

## 2022-01-04 DIAGNOSIS — M08.00 JUVENILE RHEUMATOID ARTHRITIS (HCC): ICD-10-CM

## 2022-01-04 DIAGNOSIS — K59.09 CHRONIC CONSTIPATION: ICD-10-CM

## 2022-01-04 DIAGNOSIS — F33.9 EPISODE OF RECURRENT MAJOR DEPRESSIVE DISORDER, UNSPECIFIED DEPRESSION EPISODE SEVERITY (HCC): ICD-10-CM

## 2022-01-04 DIAGNOSIS — R20.2 COMPLAINT OF PARESTHESIA: ICD-10-CM

## 2022-01-04 DIAGNOSIS — R10.84 GENERALIZED ABDOMINAL PAIN: Primary | ICD-10-CM

## 2022-01-04 PROCEDURE — G8427 DOCREV CUR MEDS BY ELIG CLIN: HCPCS | Performed by: NURSE PRACTITIONER

## 2022-01-04 PROCEDURE — G8484 FLU IMMUNIZE NO ADMIN: HCPCS | Performed by: NURSE PRACTITIONER

## 2022-01-04 PROCEDURE — 99215 OFFICE O/P EST HI 40 MIN: CPT | Performed by: NURSE PRACTITIONER

## 2022-01-04 PROCEDURE — G8417 CALC BMI ABV UP PARAM F/U: HCPCS | Performed by: NURSE PRACTITIONER

## 2022-01-04 PROCEDURE — 4004F PT TOBACCO SCREEN RCVD TLK: CPT | Performed by: NURSE PRACTITIONER

## 2022-01-04 RX ORDER — GABAPENTIN 100 MG/1
100 CAPSULE ORAL 2 TIMES DAILY
Qty: 60 CAPSULE | Refills: 1 | Status: SHIPPED | OUTPATIENT
Start: 2022-01-04 | End: 2022-03-07

## 2022-01-04 RX ORDER — DESVENLAFAXINE 25 MG/1
25 TABLET, EXTENDED RELEASE ORAL DAILY
Qty: 30 TABLET | Refills: 1 | Status: SHIPPED | OUTPATIENT
Start: 2022-01-04 | End: 2022-03-02

## 2022-01-04 RX ORDER — GABAPENTIN 300 MG/1
300 CAPSULE ORAL DAILY
Qty: 90 CAPSULE | Refills: 1 | Status: SHIPPED | OUTPATIENT
Start: 2022-01-04 | End: 2022-02-03

## 2022-01-04 RX ORDER — HYDROXYZINE HYDROCHLORIDE 25 MG/1
25 TABLET, FILM COATED ORAL EVERY 8 HOURS PRN
Qty: 45 TABLET | Refills: 0 | Status: SHIPPED | OUTPATIENT
Start: 2022-01-04 | End: 2022-01-24

## 2022-01-04 RX ORDER — POLYETHYLENE GLYCOL 3350 17 G/17G
17 POWDER, FOR SOLUTION ORAL DAILY
Qty: 1530 G | Refills: 1 | Status: SHIPPED | OUTPATIENT
Start: 2022-01-04 | End: 2022-02-03

## 2022-01-04 ASSESSMENT — PATIENT HEALTH QUESTIONNAIRE - PHQ9
SUM OF ALL RESPONSES TO PHQ QUESTIONS 1-9: 11
8. MOVING OR SPEAKING SO SLOWLY THAT OTHER PEOPLE COULD HAVE NOTICED. OR THE OPPOSITE, BEING SO FIGETY OR RESTLESS THAT YOU HAVE BEEN MOVING AROUND A LOT MORE THAN USUAL: 0
10. IF YOU CHECKED OFF ANY PROBLEMS, HOW DIFFICULT HAVE THESE PROBLEMS MADE IT FOR YOU TO DO YOUR WORK, TAKE CARE OF THINGS AT HOME, OR GET ALONG WITH OTHER PEOPLE: 1
5. POOR APPETITE OR OVEREATING: 2
SUM OF ALL RESPONSES TO PHQ9 QUESTIONS 1 & 2: 4
SUM OF ALL RESPONSES TO PHQ QUESTIONS 1-9: 11
6. FEELING BAD ABOUT YOURSELF - OR THAT YOU ARE A FAILURE OR HAVE LET YOURSELF OR YOUR FAMILY DOWN: 0
3. TROUBLE FALLING OR STAYING ASLEEP: 2
1. LITTLE INTEREST OR PLEASURE IN DOING THINGS: 2
2. FEELING DOWN, DEPRESSED OR HOPELESS: 2
9. THOUGHTS THAT YOU WOULD BE BETTER OFF DEAD, OR OF HURTING YOURSELF: 0
7. TROUBLE CONCENTRATING ON THINGS, SUCH AS READING THE NEWSPAPER OR WATCHING TELEVISION: 1
4. FEELING TIRED OR HAVING LITTLE ENERGY: 2
SUM OF ALL RESPONSES TO PHQ QUESTIONS 1-9: 11
SUM OF ALL RESPONSES TO PHQ QUESTIONS 1-9: 11

## 2022-01-04 ASSESSMENT — ENCOUNTER SYMPTOMS: CONSTIPATION: 1

## 2022-01-04 NOTE — PROGRESS NOTES
[address]   [facility] [address] [place] [facility] [address]  [phone]    2022     CHIEF COMPLAINT:     Amy Rodriguez (:  1990) is a 27 y.o. female, here for evaluation of the following chief complaint(s):  Abdominal Pain, Constipation, and Depression      REVIEWED INFORMATION      Allergies   Allergen Reactions   Daina Marines [Tofacitinib] Anaphylaxis and Other (See Comments)     gastritis    Ceclor [Cefaclor] Hives    Cephalosporins      hives       Current Outpatient Medications   Medication Sig Dispense Refill    gabapentin (NEURONTIN) 100 MG capsule Take 1 capsule by mouth 2 times daily for 30 days. 60 capsule 1    gabapentin (NEURONTIN) 300 MG capsule Take 1 capsule by mouth daily for 30 days. 300 mg Nightly 90 capsule 1    diclofenac sodium (VOLTAREN) 1 % GEL Apply 2 g topically 4 times daily as needed for Pain 350 g 3    polyethylene glycol (GLYCOLAX) 17 GM/SCOOP powder Take 17 g by mouth daily 1530 g 1    desvenlafaxine succinate (PRISTIQ) 25 MG TB24 extended release tablet Take 1 tablet by mouth daily 30 tablet 1    naproxen (NAPROSYN) 500 MG tablet Take 1 tablet by mouth 2 times daily (with meals) 60 tablet 5    lansoprazole (PREVACID) 30 MG delayed release capsule Take 1 capsule by mouth daily 90 capsule 1    pantoprazole (PROTONIX) 40 MG tablet Take 1 tablet by mouth 2 times daily (before meals) 60 tablet 1    Multiple Vitamin (MULTI-VITAMIN DAILY PO) Take by mouth      vitamin B-12 (CYANOCOBALAMIN) 100 MCG tablet Take 50 mcg by mouth daily      albuterol sulfate HFA (PROAIR HFA) 108 (90 Base) MCG/ACT inhaler Inhale 2 puffs into the lungs every 6 hours as needed for Wheezing 3 Inhaler 3    hydrOXYzine (ATARAX) 25 MG tablet TAKE 1 TABLET BY MOUTH EVERY 8 HOURS AS NEEDED (INSOMNIA) 45 tablet 0     No current facility-administered medications for this visit.         Patient Care Team:  VERONICA Dunn - CNP as PCP - General (Nurse Practitioner)  Ag Ulloa VERONICA Torres - CNP as PCP - [address] [place] [address] Dr Cuenca Provider  Cheyanne Alvarado MD as Consulting Physician (Rheumatology)  Sharmila Figueroa as Consulting Physician (Obstetrics & Gynecology)  Deloris Lozoya MD as Surgeon (General Surgery)    REVIEW OF SYSTEMS:     Review of Systems   Constitutional: Negative for activity change, fatigue and unexpected weight change. HENT: Negative for congestion, ear pain, hearing loss, rhinorrhea and sore throat. Respiratory: Negative for cough and shortness of breath. Cardiovascular: Negative for chest pain, palpitations and leg swelling. Gastrointestinal: Positive for abdominal pain and constipation. Negative for diarrhea. Change in bowel habits for the last few months      Musculoskeletal: Positive for arthralgias and myalgias. Negative for gait problem. Neurological: Negative for dizziness, weakness and headaches. Psychiatric/Behavioral: Positive for dysphoric mood. Negative for confusion. The patient is nervous/anxious. HISTORY OF PRESENT ILLNESS     Abdominal Pain/ constpation  Patient presents for change in bowel habits - she reports that she has been having bowel movement every 3-5 days. Denies blood in her stool. She reports increased midabdominal cramping and pain when she his not able to have bowel movement. She reports that she drinks 2-3 large 20 oz mug of water per day - reports she is taking over the counter fiber. She took herbal tea for 2 night strait and she increased the Calm Magnesium powder. Increased depression - due to Daughters biological [facility]   [place] 9 score 11       PHYSICAL EXAM:     /88   Pulse 95   Temp 99.3 °F (37.4 °C) (Tympanic)   Wt 187 lb (84.8 kg)   SpO2 98%   BMI 34.20 kg/m²      Physical Exam  Vitals reviewed. Constitutional:       Appearance: Normal appearance. She is not ill-appearing. Cardiovascular:      Rate and Rhythm: Normal rate and regular rhythm. Heart sounds:  No murmur heard. No friction rub. No gallop. Pulmonary:      Effort: Pulmonary effort is normal. No respiratory distress. Breath sounds: No wheezing. Abdominal:      General: Bowel sounds are normal.      Palpations: Abdomen is soft. Tenderness: There is no abdominal tenderness. Musculoskeletal:      Right lower leg: No edema. Left lower leg: No edema. Skin:     General: Skin is warm. Findings: No erythema, lesion or rash. Neurological:      Mental Status: She is alert. Psychiatric:         Mood and Affect: Mood normal.         Behavior: Behavior is cooperative. PROCEDURE/ IN OFFICE TESTING/ LAB REVIEW     No in office testing or procedures completed during today's office visit. ASSESSMENT/PLAN/ FOLLOWUP:     PLEASE NOTE THAT ANY DISCONTINUATION OF MEDICATIONS OR MEDICAL SUPPLIES REFLECTED IN TODAY'S VISIT SUMMARY  MAY NOT HAVE COMPLETED AS A CHANGE IN YOUR PLAN OF CARE. THESE CHANGES MAY HAVE ONLY BEEN DONE SO IN ORDER TO CLEAN UP LIST FROM DUPLICATIONS OR MISCELLANEOUS SUPPLIES ONLY NEEDED PERIODIC REORDERS. DO NOT DISCONTINUE MEDICATIONS LISTED UNLESS SPECIFICALLY DISCUSSED IN YOUR APPOINTMENT WITH PROVIDER OR SPECIALIST, IF YOU HAVE AN QUESTIONS, PLEASE CONTACT YOUR PROVIDER FOR CLARIFICATION IF NOT ADDRESSED IN YOUR PLAN OF CARE. 1. Generalized abdominal pain  -     XR ABDOMEN (KUB) (SINGLE AP VIEW); Future  -     Meet Yoo MD, Gastroenterology, [place]  2. Episode of recurrent major depressive disorder, unspecified depression episode severity (HCC)  -     desvenlafaxine succinate (PRISTIQ) 25 MG TB24 extended release tablet; Take 1 tablet by mouth daily, Disp-30 tablet, R-1Normal  3. Complaint of paresthesia  -     gabapentin (NEURONTIN) 100 MG capsule; Take 1 capsule by mouth 2 times daily for 30 days. , Disp-60 capsule, R-1Normal  -     gabapentin (NEURONTIN) 300 MG capsule; Take 1 capsule by mouth daily for 30 days.  300 mg Nightly, Disp-90 capsule, R-1Normal  4. Juvenile rheumatoid arthritis ([address]  Comments:  currently not treated by Rheumatology  using home therapies and ibuprofen  Orders:  -     diclofenac sodium (VOLTAREN) 1 % GEL; Apply 2 g topically 4 times daily as needed for Pain, Topical, 4 TIMES DAILY PRN Starting Tue 1/4/2022, Disp-350 g, R-3, Normal  5. Chronic constipation  -     Carlos Manuel Hernandez MD, Gastroenterology, [place]  -     polyethylene glycol [facility]) 17 GM/SCOOP powder; Take 17 g by mouth daily, Disp-1530 g, R-1Normal  6. Anxiety      Started Pristiq -     Return in about 5 weeks (around 2/8/2022) for evaluate new medication started, symptom check and lab review. COMMUNICATION:       On this date 1/4/2022 I have spent 48 minutes reviewing previous notes, test results and face to face with the patient discussing the diagnosis and importance of compliance with the treatment plan as well as documenting on the day of the visit. The best way to find yourself is to lose yourself in the service of others - [address] [place] [address]. [address] [place] [address]   [email or web address]  Office: [phone]     An electronic signature was used to authenticate this note.   Signed by VERONICA Chase CNP, APRN-CNP on 1/27/2022 at 9:46 AM

## 2022-01-22 DIAGNOSIS — F41.9 ANXIETY: ICD-10-CM

## 2022-01-24 RX ORDER — HYDROXYZINE HYDROCHLORIDE 25 MG/1
25 TABLET, FILM COATED ORAL EVERY 8 HOURS PRN
Qty: 45 TABLET | Refills: 0 | Status: SHIPPED | OUTPATIENT
Start: 2022-01-24 | End: 2022-03-07

## 2022-01-24 NOTE — TELEPHONE ENCOUNTER
Last visit:01/04/21   Last Med refill: 01/04/21  Does patient have enough medication for 72 hours: Yes.     Next Visit Date:  Future Appointments   Date Time Provider Miryam Richardson   2/8/2022  3:00 PM VERONICA Palacios CNP [facility] [address] Maintenance   Topic Date Due    Pneumococcal 0-64 years Vaccine (1 of 2 - PPSV23) 03/01/2022 (Originally 3/9/1995)    COVID-19 Vaccine (1) 03/01/2022 (Originally 3/9/1994)    Flu vaccine (1) 01/04/2023 (Originally 9/1/2021)    Depression Monitoring  01/04/2023    DTaP/Tdap/Td vaccine (2 - Td or Tdap) 08/15/2026    Hepatitis C screen  Completed    HIV screen  Completed    Hepatitis A vaccine  Aged Out    Hepatitis B vaccine  Aged Out    Hib vaccine  Aged Out    Meningococcal (ACWY) vaccine  Aged Out    Varicella vaccine  Discontinued       Hemoglobin A1C (%)   Date Value   04/15/2021 4.9   07/02/2018 4.8   10/19/2017 4.8             ( goal A1C is < 7)   No results found for: LABMICR  LDL Cholesterol (mg/dL)   Date Value   04/15/2021 108   10/19/2017 87       (goal LDL is <100)   AST (U/L)   Date Value   12/07/2021 16     ALT (U/L)   Date Value   12/07/2021 19     BUN (mg/dL)   Date Value   12/07/2021 15     BP Readings from Last 3 Encounters:   01/04/22 136/88   10/21/21 135/85   09/09/21 100/60          (goal 120/80)    All Future Testing planned in [facility]  Lab Frequency Next Occurrence   COVID-19 Once 02/05/2021   COVID-19 Once 02/22/2021   HM DEXA SCAN Once 05/26/2022   XR ABDOMEN (KUB) (SINGLE AP VIEW) Once 02/02/2022               Patient Active Problem List:     Juvenile rheumatoid arthritis ([facility] [address]     Post traumatic stress disorder (PTSD)     History of sepsis     Hyperglycemia     Chronic bilateral low back pain with sciatica     Lumbar disc disease     Chronic nonintractable headache     Depression     Anxiety

## 2022-01-27 ENCOUNTER — TELEPHONE (OUTPATIENT)
Dept: GASTROENTEROLOGY | Age: 32
End: 2022-01-27

## 2022-01-27 ASSESSMENT — ENCOUNTER SYMPTOMS
SORE THROAT: 0
RHINORRHEA: 0
SHORTNESS OF BREATH: 0
COUGH: 0
DIARRHEA: 0
ABDOMINAL PAIN: 1

## 2022-01-27 NOTE — TELEPHONE ENCOUNTER
Est patient last seen IP by Dr WOO per [facility] notes on 3-13-18 / new referral for - Generalized abdominal pain - Chronic constipation / para adv

## 2022-02-08 ENCOUNTER — HOSPITAL ENCOUNTER (OUTPATIENT)
Dept: GENERAL RADIOLOGY | Age: 32
Discharge: HOME | End: 2022-02-10
Payer: COMMERCIAL

## 2022-02-08 ENCOUNTER — HOSPITAL ENCOUNTER (OUTPATIENT)
Age: 32
Discharge: HOME | End: 2022-02-10
Payer: COMMERCIAL

## 2022-02-08 DIAGNOSIS — R10.84 GENERALIZED ABDOMINAL PAIN: Primary | ICD-10-CM

## 2022-02-08 DIAGNOSIS — R10.84 GENERALIZED ABDOMINAL PAIN: ICD-10-CM

## 2022-02-08 PROCEDURE — 74018 RADEX ABDOMEN 1 VIEW: CPT

## 2022-03-02 DIAGNOSIS — F33.9 EPISODE OF RECURRENT MAJOR DEPRESSIVE DISORDER, UNSPECIFIED DEPRESSION EPISODE SEVERITY (HCC): ICD-10-CM

## 2022-03-02 RX ORDER — DESVENLAFAXINE 25 MG/1
25 TABLET, EXTENDED RELEASE ORAL DAILY
Qty: 30 TABLET | Refills: 5 | Status: SHIPPED | OUTPATIENT
Start: 2022-03-02 | End: 2022-09-12

## 2022-03-02 NOTE — TELEPHONE ENCOUNTER
LOV 1/4/22  LRF 1/4/22  RTO 5 weeks    Health Maintenance   Topic Date Due    COVID-19 Vaccine (1) Never done    Pneumococcal 0-64 years Vaccine (1 of 2 - PPSV23) Never done    Flu vaccine (1) 01/04/2023 (Originally 9/1/2021)    Depression Monitoring  01/04/2023    DTaP/Tdap/Td vaccine (2 - Td or Tdap) 08/15/2026    Hepatitis C screen  Completed    HIV screen  Completed    Hepatitis A vaccine  Aged Out    Hepatitis B vaccine  Aged Out    Hib vaccine  Aged Out    Meningococcal (ACWY) vaccine  Aged Out    Varicella vaccine  Discontinued             (applicable per patient's age: Cancer Screenings, Depression Screening, Fall Risk Screening, Immunizations)    Hemoglobin A1C (%)   Date Value   04/15/2021 4.9   07/02/2018 4.8   10/19/2017 4.8     LDL Cholesterol (mg/dL)   Date Value   04/15/2021 108     AST (U/L)   Date Value   12/07/2021 16     ALT (U/L)   Date Value   12/07/2021 19     BUN (mg/dL)   Date Value   12/07/2021 15      (goal A1C is < 7)   (goal LDL is <100) need 30-50% reduction from baseline     BP Readings from Last 3 Encounters:   01/04/22 136/88   10/21/21 135/85   09/09/21 100/60    (goal /80)      All Future Testing planned in [facility]:  Lab Frequency Next Occurrence   HM DEXA SCAN Once 05/26/2022   CT KIDNEY WO CONTRAST Once 02/08/2022       Next Visit Date:  No future appointments.          Patient Active Problem List:     Juvenile rheumatoid arthritis ([facility] [address]     Post traumatic stress disorder (PTSD)     History of sepsis     Hyperglycemia     Chronic bilateral low back pain with sciatica     Lumbar disc disease     Chronic nonintractable headache     Depression     Anxiety

## 2022-03-04 DIAGNOSIS — R20.2 COMPLAINT OF PARESTHESIA: ICD-10-CM

## 2022-03-04 DIAGNOSIS — F41.9 ANXIETY: ICD-10-CM

## 2022-03-07 NOTE — TELEPHONE ENCOUNTER
LOV 1/4/22  LRF 1/4/22 Gabapentin, Hydroxyzine 1/24/22    Health Maintenance   Topic Date Due    COVID-19 Vaccine (1) Never done    Pneumococcal 0-64 years Vaccine (1 of 2 - PPSV23) Never done    Flu vaccine (1) 01/04/2023 (Originally 9/1/2021)    Depression Monitoring  01/04/2023    DTaP/Tdap/Td vaccine (2 - Td or Tdap) 08/15/2026    Hepatitis C screen  Completed    HIV screen  Completed    Hepatitis A vaccine  Aged Out    Hepatitis B vaccine  Aged Out    Hib vaccine  Aged Out    Meningococcal (ACWY) vaccine  Aged Out    Varicella vaccine  Discontinued             (applicable per patient's age: Cancer Screenings, Depression Screening, Fall Risk Screening, Immunizations)    Hemoglobin A1C (%)   Date Value   04/15/2021 4.9   07/02/2018 4.8   10/19/2017 4.8     LDL Cholesterol (mg/dL)   Date Value   04/15/2021 108     AST (U/L)   Date Value   12/07/2021 16     ALT (U/L)   Date Value   12/07/2021 19     BUN (mg/dL)   Date Value   12/07/2021 15      (goal A1C is < 7)   (goal LDL is <100) need 30-50% reduction from baseline     BP Readings from Last 3 Encounters:   01/04/22 136/88   10/21/21 135/85   09/09/21 100/60    (goal /80)      All Future Testing planned in [facility]:  Lab Frequency Next Occurrence   HM DEXA SCAN Once 05/26/2022   CT KIDNEY WO CONTRAST Once 02/08/2022       Next Visit Date:  No future appointments.          Patient Active Problem List:     Juvenile rheumatoid arthritis ([facility] [address]     Post traumatic stress disorder (PTSD)     History of sepsis     Hyperglycemia     Chronic bilateral low back pain with sciatica     Lumbar disc disease     Chronic nonintractable headache     Depression     Anxiety

## 2022-03-08 RX ORDER — HYDROXYZINE HYDROCHLORIDE 25 MG/1
25 TABLET, FILM COATED ORAL EVERY 8 HOURS PRN
Qty: 45 TABLET | Refills: 0 | Status: SHIPPED | OUTPATIENT
Start: 2022-03-08 | End: 2022-03-26

## 2022-03-08 RX ORDER — GABAPENTIN 100 MG/1
100 CAPSULE ORAL 2 TIMES DAILY
Qty: 60 CAPSULE | Refills: 5 | Status: SHIPPED | OUTPATIENT
Start: 2022-03-08 | End: 2023-03-08

## 2022-03-25 DIAGNOSIS — F41.9 ANXIETY: ICD-10-CM

## 2022-03-25 NOTE — TELEPHONE ENCOUNTER
Last visit: 01/04/22  Last Med refill: 03/08/22  Does patient have enough medication for 72 hours: Yes    Next Visit Date:  No future appointments.     Health Maintenance   Topic Date Due    COVID-19 Vaccine (1) Never done    Pneumococcal 0-64 years Vaccine (1 of 2 - PPSV23) Never done    Flu vaccine (1) 01/04/2023 (Originally 9/1/2021)    Depression Monitoring  01/04/2023    DTaP/Tdap/Td vaccine (2 - Td or Tdap) 08/15/2026    Hepatitis C screen  Completed    HIV screen  Completed    Hepatitis A vaccine  Aged Out    Hepatitis B vaccine  Aged Out    Hib vaccine  Aged Out    Meningococcal (ACWY) vaccine  Aged Out    Varicella vaccine  Discontinued       Hemoglobin A1C (%)   Date Value   04/15/2021 4.9   07/02/2018 4.8   10/19/2017 4.8             ( goal A1C is < 7)   No results found for: LABMICR  LDL Cholesterol (mg/dL)   Date Value   04/15/2021 108   10/19/2017 87       (goal LDL is <100)   AST (U/L)   Date Value   12/07/2021 16     ALT (U/L)   Date Value   12/07/2021 19     BUN (mg/dL)   Date Value   12/07/2021 15     BP Readings from Last 3 Encounters:   01/04/22 136/88   10/21/21 135/85   09/09/21 100/60          (goal 120/80)    All Future Testing planned in [facility]  Lab Frequency Next Occurrence   HM DEXA SCAN Once 05/26/2022   CT KIDNEY WO CONTRAST Once 02/08/2022               Patient Active Problem List:     Juvenile rheumatoid arthritis ([facility] [address]     Post traumatic stress disorder (PTSD)     History of sepsis     Hyperglycemia     Chronic bilateral low back pain with sciatica     Lumbar disc disease     Chronic nonintractable headache     Depression     Anxiety

## 2022-03-26 RX ORDER — HYDROXYZINE HYDROCHLORIDE 25 MG/1
25 TABLET, FILM COATED ORAL EVERY 8 HOURS PRN
Qty: 45 TABLET | Refills: 0 | Status: SHIPPED | OUTPATIENT
Start: 2022-03-26 | End: 2022-09-19

## 2022-05-04 DIAGNOSIS — M08.00 JUVENILE RHEUMATOID ARTHRITIS (HCC): ICD-10-CM

## 2022-05-04 NOTE — TELEPHONE ENCOUNTER
Last visit: 01/04/22  Last Med refill: 01/04/22  Does patient have enough medication for 72 hours: Yes    Next Visit Date:  No future appointments.     Health Maintenance   Topic Date Due    COVID-19 Vaccine (1) Never done    Pneumococcal 0-64 years Vaccine (1 - PCV) Never done    Flu vaccine (Season Ended) 01/04/2023 (Originally 9/1/2022)    Depression Monitoring  01/04/2023    DTaP/Tdap/Td vaccine (2 - Td or Tdap) 08/15/2026    Hepatitis C screen  Completed    HIV screen  Completed    Hepatitis A vaccine  Aged Out    Hepatitis B vaccine  Aged Out    Hib vaccine  Aged Out    Meningococcal (ACWY) vaccine  Aged Out    Varicella vaccine  Discontinued       Hemoglobin A1C (%)   Date Value   04/15/2021 4.9   07/02/2018 4.8   10/19/2017 4.8             ( goal A1C is < 7)   No results found for: LABMICR  LDL Cholesterol (mg/dL)   Date Value   04/15/2021 108   10/19/2017 87       (goal LDL is <100)   AST (U/L)   Date Value   12/07/2021 16     ALT (U/L)   Date Value   12/07/2021 19     BUN (mg/dL)   Date Value   12/07/2021 15     BP Readings from Last 3 Encounters:   01/04/22 136/88   10/21/21 135/85   09/09/21 100/60          (goal 120/80)    All Future Testing planned in [facility]  Lab Frequency Next Occurrence   HM DEXA SCAN Once 05/26/2022   CT KIDNEY WO CONTRAST Once 02/08/2022               Patient Active Problem List:     Juvenile rheumatoid arthritis ([facility] [address]     Post traumatic stress disorder (PTSD)     History of sepsis     Hyperglycemia     Chronic bilateral low back pain with sciatica     Lumbar disc disease     Chronic nonintractable headache     Depression     Anxiety

## 2022-06-03 ENCOUNTER — TELEMEDICINE (OUTPATIENT)
Dept: FAMILY MEDICINE CLINIC | Age: 32
End: 2022-06-03
Payer: COMMERCIAL

## 2022-06-03 DIAGNOSIS — G43.119 INTRACTABLE MIGRAINE WITH AURA WITHOUT STATUS MIGRAINOSUS: Primary | ICD-10-CM

## 2022-06-03 PROCEDURE — 99214 OFFICE O/P EST MOD 30 MIN: CPT | Performed by: NURSE PRACTITIONER

## 2022-06-03 PROCEDURE — G8427 DOCREV CUR MEDS BY ELIG CLIN: HCPCS | Performed by: NURSE PRACTITIONER

## 2022-06-03 PROCEDURE — G8417 CALC BMI ABV UP PARAM F/U: HCPCS | Performed by: NURSE PRACTITIONER

## 2022-06-03 PROCEDURE — 4004F PT TOBACCO SCREEN RCVD TLK: CPT | Performed by: NURSE PRACTITIONER

## 2022-06-03 RX ORDER — BUTALBITAL, ACETAMINOPHEN AND CAFFEINE 50; 325; 40 MG/1; MG/1; MG/1
1 TABLET ORAL EVERY 4 HOURS PRN
Qty: 180 TABLET | Refills: 3 | Status: SHIPPED | OUTPATIENT
Start: 2022-06-03

## 2022-06-03 RX ORDER — TOPIRAMATE 50 MG/1
50 TABLET, FILM COATED ORAL 2 TIMES DAILY
Qty: 60 TABLET | Refills: 3 | Status: SHIPPED | OUTPATIENT
Start: 2022-06-03 | End: 2022-10-25

## 2022-06-03 SDOH — ECONOMIC STABILITY: FOOD INSECURITY: WITHIN THE PAST 12 MONTHS, THE FOOD YOU BOUGHT JUST DIDN'T LAST AND YOU DIDN'T HAVE MONEY TO GET MORE.: NEVER TRUE

## 2022-06-03 SDOH — ECONOMIC STABILITY: FOOD INSECURITY: WITHIN THE PAST 12 MONTHS, YOU WORRIED THAT YOUR FOOD WOULD RUN OUT BEFORE YOU GOT MONEY TO BUY MORE.: NEVER TRUE

## 2022-06-03 ASSESSMENT — SOCIAL DETERMINANTS OF HEALTH (SDOH): HOW HARD IS IT FOR YOU TO PAY FOR THE VERY BASICS LIKE FOOD, HOUSING, MEDICAL CARE, AND HEATING?: NOT HARD AT ALL

## 2022-06-03 NOTE — PROGRESS NOTES
Johann Hidalgo (:  1990) is a Established patient, here for evaluation of the following:    Chief Complaint   Patient presents with    Migraine    ED Follow-up         Assessment & Plan   Below is the assessment and plan developed based on review of pertinent history, physical exam, labs, studies, and medications. 3x 1-2 days    Start right eye may radiate to left side at time, but primarily on the right side    partethesia a time - occassionaly - most recent when diagnosed with covid and ER she did experience,     Uses OTC excedirin ice sleep -      Topamax      1. Intractable migraine with aura without status migrainosus  -     topiramate (TOPAMAX) 50 mg tablet; Take 1 tablet by mouth 2 times daily, Disp-60 tablet, R-3Normal  -     butalbital-acetaminophen-caffeine (FIORICET, ESGIC) -40 MG per tablet; Take 1 tablet by mouth every 4 hours as needed for Headaches, Disp-180 tablet, R-3Normal    Return in about 6 weeks (around 7/15/2022) for evaluate new medication started. Subjective    Complaint of Migraine headaches   Starting  3x 1-2 days    Start right eye may radiate to left side at time, but primarily on the right side    partethesia a time - occassionaly - most recent when diagnosed with covid and ER she did experience,     Uses OTC excedirin ice sleep -      Discussed starting topamax    Review of Systems   Constitutional: Positive for activity change and fatigue. Gastrointestinal: Positive for nausea. Neurological: Positive for headaches. Objective   Patient-Reported Vitals  No data recorded       Physical Exam  Constitutional:       Appearance: Normal appearance. She is well-developed. She is not ill-appearing. Eyes:      General:         Right eye: No discharge. Left eye: No discharge. Extraocular Movements: Extraocular movements intact. Conjunctiva/sclera: Conjunctivae normal.   Neck:      Thyroid: No thyroid mass. Vascular: No JVD. Pulmonary:      Effort: Pulmonary effort is normal. No respiratory distress. Musculoskeletal:      Right shoulder: No deformity. Normal range of motion. Left shoulder: No deformity. Normal range of motion. Cervical back: Normal range of motion. Skin:     General: Skin is moist.      Coloration: Skin is not cyanotic or jaundiced. Findings: No rash. Neurological:      General: No focal deficit present. Mental Status: She is alert and oriented to person, place, and time. Gait: Gait is intact. Psychiatric:         Attention and Perception: Attention normal. She does not perceive auditory or visual hallucinations. Mood and Affect: Mood normal.         Speech: Speech normal.              On this date 6/3/2022 I have spent 30 minutes reviewing previous notes, test results and face to face (virtual) with the patient discussing the diagnosis and importance of compliance with the treatment plan as well as documenting on the day of the visit. Crissy Rg, was evaluated through a synchronous (real-time) audio-video encounter. The patient (or guardian if applicable) is aware that this is a billable service, which includes applicable co-pays. This Virtual Visit was conducted with patient's (and/or legal guardian's) consent. The visit was conducted pursuant to the emergency declaration under the [address] [place] [address] authority and the [employer] Act. Patient identification was verified, and a caregiver was present when appropriate. The patient was located at Home: [address] [place] [id number]. Provider was located at Other: [facility].         --Debra Mendoza, APRN - CNP

## 2022-06-30 ASSESSMENT — ENCOUNTER SYMPTOMS: NAUSEA: 1

## 2022-09-10 DIAGNOSIS — F33.9 EPISODE OF RECURRENT MAJOR DEPRESSIVE DISORDER, UNSPECIFIED DEPRESSION EPISODE SEVERITY (HCC): ICD-10-CM

## 2022-09-10 NOTE — TELEPHONE ENCOUNTER
Last visit: 1/4/22  Last Med refill: 3/2/22  Does patient have enough medication for 72 hours: Yes    Next Visit Date:  No future appointments.     Health Maintenance   Topic Date Due    COVID-19 Vaccine (1) Never done    Pneumococcal 0-64 years Vaccine (1 - PCV) Never done    Flu vaccine (1) 09/01/2022    Depression Monitoring  01/04/2023    DTaP/Tdap/Td vaccine (2 - Td or Tdap) 08/15/2026    Hepatitis C screen  Completed    HIV screen  Completed    Hepatitis A vaccine  Aged Out    Hepatitis B vaccine  Aged Out    Hib vaccine  Aged Out    Meningococcal (ACWY) vaccine  Aged Out    Varicella vaccine  Discontinued       Hemoglobin A1C (%)   Date Value   04/15/2021 4.9   07/02/2018 4.8   10/19/2017 4.8             ( goal A1C is < 7)   No results found for: LABMICR  LDL Cholesterol (mg/dL)   Date Value   04/15/2021 108   10/19/2017 87       (goal LDL is <100)   AST (U/L)   Date Value   12/07/2021 16     ALT (U/L)   Date Value   12/07/2021 19     BUN (mg/dL)   Date Value   12/07/2021 15     BP Readings from Last 3 Encounters:   01/04/22 136/88   10/21/21 135/85   09/09/21 100/60          (goal 120/80)    All Future Testing planned in [facility]  Lab Frequency Next Occurrence   CT KIDNEY WO CONTRAST Once 02/08/2022               Patient Active Problem List:     Juvenile rheumatoid arthritis ([facility] [address]     Post traumatic stress disorder (PTSD)     History of sepsis     Hyperglycemia     Chronic bilateral low back pain with sciatica     Lumbar disc disease     Chronic nonintractable headache     Depression     Anxiety

## 2022-09-12 RX ORDER — DESVENLAFAXINE 25 MG/1
25 TABLET, EXTENDED RELEASE ORAL DAILY
Qty: 30 TABLET | Refills: 0 | Status: SHIPPED | OUTPATIENT
Start: 2022-09-12

## 2022-09-15 DIAGNOSIS — F41.9 ANXIETY: ICD-10-CM

## 2022-09-19 RX ORDER — HYDROXYZINE HYDROCHLORIDE 25 MG/1
25 TABLET, FILM COATED ORAL EVERY 8 HOURS PRN
Qty: 45 TABLET | Refills: 5 | Status: SHIPPED | OUTPATIENT
Start: 2022-09-19 | End: 2023-09-19

## 2022-09-19 NOTE — TELEPHONE ENCOUNTER
Request for Hydroxyzine. Last script sent: 3/26/22  Last OV: 6/3/22  Next Visit Date:  No future appointments.     Health Maintenance   Topic Date Due    COVID-19 Vaccine (1) Never done    Pneumococcal 0-64 years Vaccine (1 - PCV) Never done    Flu vaccine (1) 09/01/2022    Depression Monitoring  01/04/2023    DTaP/Tdap/Td vaccine (2 - Td or Tdap) 08/15/2026    Hepatitis C screen  Completed    HIV screen  Completed    Hepatitis A vaccine  Aged Out    Hepatitis B vaccine  Aged Out    Hib vaccine  Aged Out    Meningococcal (ACWY) vaccine  Aged Out    Varicella vaccine  Discontinued       Hemoglobin A1C (%)   Date Value   04/15/2021 4.9   07/02/2018 4.8   10/19/2017 4.8             ( goal A1C is < 7)   No results found for: LABMICR  LDL Cholesterol (mg/dL)   Date Value   04/15/2021 108       (goal LDL is <100)   AST (U/L)   Date Value   12/07/2021 16     ALT (U/L)   Date Value   12/07/2021 19     BUN (mg/dL)   Date Value   12/07/2021 15     BP Readings from Last 3 Encounters:   01/04/22 136/88   10/21/21 135/85   09/09/21 100/60          (goal 120/80)    All Future Testing planned in [facility]  Lab Frequency Next Occurrence   CT KIDNEY WO CONTRAST Once 02/08/2022         Patient Active Problem List:     Juvenile rheumatoid arthritis ([facility] [address]     Post traumatic stress disorder (PTSD)     History of sepsis     Hyperglycemia     Chronic bilateral low back pain with sciatica     Lumbar disc disease     Chronic nonintractable headache     Depression     Anxiety

## 2022-09-26 ENCOUNTER — TELEPHONE (OUTPATIENT)
Dept: FAMILY MEDICINE CLINIC | Age: 32
End: 2022-09-26

## 2022-09-26 NOTE — TELEPHONE ENCOUNTER
PA request for Pristiq     PA processed and submitted to pt insurance, waiting for response in regards to coverage

## 2022-10-05 NOTE — TELEPHONE ENCOUNTER
Fax here from Sandeep Tracy. The Prior Authorization Request for Pristiq has been denied. Denial information scanned to this encounter.  Writer is going to call for an appeal.

## 2022-10-25 DIAGNOSIS — G43.119 INTRACTABLE MIGRAINE WITH AURA WITHOUT STATUS MIGRAINOSUS: ICD-10-CM

## 2022-10-25 RX ORDER — TOPIRAMATE 50 MG/1
TABLET, FILM COATED ORAL
Qty: 60 TABLET | Refills: 0 | Status: SHIPPED | OUTPATIENT
Start: 2022-10-25

## 2022-10-25 NOTE — TELEPHONE ENCOUNTER
Last visit: 6/3/22  Last Med refill: 6/3/22  Does patient have enough medication for 72 hours: Yes    Next Visit Date:  Future Appointments   Date Time Provider Miryam Richardson   11/21/2022  8:30 AM VERONICA Randall CNP [facility] AND [facility] [address] Maintenance   Topic Date Due    COVID-19 Vaccine (1) Never done    Pneumococcal 0-64 years Vaccine (1 - PCV) Never done    Flu vaccine (1) 08/01/2022    Depression Monitoring  01/04/2023    DTaP/Tdap/Td vaccine (2 - Td or Tdap) 08/15/2026    Hepatitis C screen  Completed    HIV screen  Completed    Hepatitis A vaccine  Aged Out    Hib vaccine  Aged Out    Meningococcal (ACWY) vaccine  Aged Out    Varicella vaccine  Discontinued       Hemoglobin A1C (%)   Date Value   04/15/2021 4.9   07/02/2018 4.8   10/19/2017 4.8             ( goal A1C is < 7)   No results found for: LABMICR  LDL Cholesterol (mg/dL)   Date Value   04/15/2021 108   10/19/2017 87       (goal LDL is <100)   AST (U/L)   Date Value   12/07/2021 16     ALT (U/L)   Date Value   12/07/2021 19     BUN (mg/dL)   Date Value   12/07/2021 15     BP Readings from Last 3 Encounters:   01/04/22 136/88   10/21/21 135/85   09/09/21 100/60          (goal 120/80)    All Future Testing planned in [facility]  Lab Frequency Next Occurrence   CT KIDNEY WO CONTRAST Once 02/08/2022               Patient Active Problem List:     Juvenile rheumatoid arthritis ([facility] [address]     Post traumatic stress disorder (PTSD)     History of sepsis     Hyperglycemia     Chronic bilateral low back pain with sciatica     Lumbar disc disease     Chronic nonintractable headache     Depression     Anxiety

## 2022-11-05 DIAGNOSIS — F33.9 EPISODE OF RECURRENT MAJOR DEPRESSIVE DISORDER, UNSPECIFIED DEPRESSION EPISODE SEVERITY (HCC): ICD-10-CM

## 2022-11-07 NOTE — TELEPHONE ENCOUNTER
Last visit: 06/03/2022  Last Med refill: 09/12/2022  Does patient have enough medication for 72 hours: Yes    Next Visit Date:  Future Appointments   Date Time Provider Miryam Richardson   11/21/2022  8:30 AM VERONICA Schuster CNP [facility] AND [facility] [address] Maintenance   Topic Date Due    COVID-19 Vaccine (1) Never done    Pneumococcal 0-64 years Vaccine (1 - PCV) Never done    Flu vaccine (1) 08/01/2022    Depression Monitoring  01/04/2023    DTaP/Tdap/Td vaccine (2 - Td or Tdap) 08/15/2026    Hepatitis C screen  Completed    HIV screen  Completed    Hepatitis A vaccine  Aged Out    Hib vaccine  Aged Out    Meningococcal (ACWY) vaccine  Aged Out    Varicella vaccine  Discontinued       Hemoglobin A1C (%)   Date Value   04/15/2021 4.9   07/02/2018 4.8   10/19/2017 4.8             ( goal A1C is < 7)   No results found for: LABMICR  LDL Cholesterol (mg/dL)   Date Value   04/15/2021 108   10/19/2017 87       (goal LDL is <100)   AST (U/L)   Date Value   12/07/2021 16     ALT (U/L)   Date Value   12/07/2021 19     BUN (mg/dL)   Date Value   12/07/2021 15     BP Readings from Last 3 Encounters:   01/04/22 136/88   10/21/21 135/85   09/09/21 100/60          (goal 120/80)    All Future Testing planned in [facility]  Lab Frequency Next Occurrence   CT KIDNEY WO CONTRAST Once 02/08/2022               Patient Active Problem List:     Juvenile rheumatoid arthritis ([facility] [address]     Post traumatic stress disorder (PTSD)     History of sepsis     Hyperglycemia     Chronic bilateral low back pain with sciatica     Lumbar disc disease     Chronic nonintractable headache     Depression     Anxiety

## 2022-11-08 RX ORDER — DESVENLAFAXINE 25 MG/1
25 TABLET, EXTENDED RELEASE ORAL DAILY
Qty: 90 TABLET | Refills: 1 | Status: SHIPPED | OUTPATIENT
Start: 2022-11-08

## 2022-11-21 ENCOUNTER — HOSPITAL ENCOUNTER (OUTPATIENT)
Age: 32
Discharge: HOME | End: 2022-11-21
Payer: COMMERCIAL

## 2022-11-21 ENCOUNTER — OFFICE VISIT (OUTPATIENT)
Dept: FAMILY MEDICINE CLINIC | Age: 32
End: 2022-11-21
Payer: COMMERCIAL

## 2022-11-21 ENCOUNTER — HOSPITAL ENCOUNTER (OUTPATIENT)
Dept: CT IMAGING | Age: 32
Discharge: HOME | End: 2022-11-23
Payer: COMMERCIAL

## 2022-11-21 ENCOUNTER — HOSPITAL ENCOUNTER (OUTPATIENT)
Age: 32
Discharge: HOME | End: 2022-11-23
Payer: COMMERCIAL

## 2022-11-21 VITALS
HEART RATE: 95 BPM | TEMPERATURE: 97.6 F | DIASTOLIC BLOOD PRESSURE: 88 MMHG | WEIGHT: 184 LBS | HEIGHT: 62 IN | SYSTOLIC BLOOD PRESSURE: 142 MMHG | OXYGEN SATURATION: 99 % | BODY MASS INDEX: 33.86 KG/M2

## 2022-11-21 DIAGNOSIS — R07.89 OTHER CHEST PAIN: ICD-10-CM

## 2022-11-21 DIAGNOSIS — E55.9 VITAMIN D DEFICIENCY: ICD-10-CM

## 2022-11-21 DIAGNOSIS — R01.1 HEART MURMUR: ICD-10-CM

## 2022-11-21 DIAGNOSIS — Z87.09 HISTORY OF PLEURISY: ICD-10-CM

## 2022-11-21 DIAGNOSIS — G43.119 INTRACTABLE MIGRAINE WITH AURA WITHOUT STATUS MIGRAINOSUS: ICD-10-CM

## 2022-11-21 DIAGNOSIS — R03.0 ELEVATED BLOOD PRESSURE READING: ICD-10-CM

## 2022-11-21 DIAGNOSIS — R53.83 FATIGUE, UNSPECIFIED TYPE: ICD-10-CM

## 2022-11-21 DIAGNOSIS — R07.89 OTHER CHEST PAIN: Primary | ICD-10-CM

## 2022-11-21 DIAGNOSIS — E53.8 B12 DEFICIENCY: ICD-10-CM

## 2022-11-21 DIAGNOSIS — M08.00 JUVENILE RHEUMATOID ARTHRITIS (HCC): ICD-10-CM

## 2022-11-21 LAB
ABSOLUTE EOS #: 0.12 K/UL (ref 0–0.44)
ABSOLUTE IMMATURE GRANULOCYTE: 0.03 K/UL (ref 0–0.3)
ABSOLUTE LYMPH #: 1.8 K/UL (ref 1.1–3.7)
ABSOLUTE MONO #: 0.36 K/UL (ref 0.1–1.2)
ALBUMIN SERPL-MCNC: 4.5 G/DL (ref 3.5–5.2)
ALBUMIN/GLOBULIN RATIO: 1.7 (ref 1–2.5)
ALP BLD-CCNC: 120 U/L (ref 35–104)
ALT SERPL-CCNC: 16 U/L (ref 5–33)
ANION GAP SERPL CALCULATED.3IONS-SCNC: 13 MMOL/L (ref 9–17)
AST SERPL-CCNC: 15 U/L
BASOPHILS # BLD: 1 % (ref 0–2)
BASOPHILS ABSOLUTE: 0.04 K/UL (ref 0–0.2)
BILIRUB SERPL-MCNC: 0.2 MG/DL (ref 0.3–1.2)
BUN BLDV-MCNC: 12 MG/DL (ref 6–20)
CALCIUM SERPL-MCNC: 9 MG/DL (ref 8.6–10.4)
CHLORIDE BLD-SCNC: 103 MMOL/L (ref 98–107)
CO2: 22 MMOL/L (ref 20–31)
CREAT SERPL-MCNC: 0.9 MG/DL (ref 0.5–0.9)
EOSINOPHILS RELATIVE PERCENT: 2 % (ref 1–4)
FOLATE: 12.6 NG/ML
GFR SERPL CREATININE-BSD FRML MDRD: >60 ML/MIN/1.73M2
GLUCOSE FASTING: 91 MG/DL (ref 70–99)
HCT VFR BLD CALC: 41.8 % (ref 36.3–47.1)
HEMOGLOBIN: 13.9 G/DL (ref 11.9–15.1)
IMMATURE GRANULOCYTES: 1 %
LYMPHOCYTES # BLD: 31 % (ref 24–43)
MCH RBC QN AUTO: 28.9 PG (ref 25.2–33.5)
MCHC RBC AUTO-ENTMCNC: 33.3 G/DL (ref 28.4–34.8)
MCV RBC AUTO: 86.9 FL (ref 82.6–102.9)
MONOCYTES # BLD: 6 % (ref 3–12)
NRBC AUTOMATED: 0 PER 100 WBC
PDW BLD-RTO: 12.8 % (ref 11.8–14.4)
PLATELET # BLD: 248 K/UL (ref 138–453)
PMV BLD AUTO: 9.5 FL (ref 8.1–13.5)
POTASSIUM SERPL-SCNC: 3.8 MMOL/L (ref 3.7–5.3)
RBC # BLD: 4.81 M/UL (ref 3.95–5.11)
SEG NEUTROPHILS: 59 % (ref 36–65)
SEGMENTED NEUTROPHILS ABSOLUTE COUNT: 3.48 K/UL (ref 1.5–8.1)
SODIUM BLD-SCNC: 138 MMOL/L (ref 135–144)
TOTAL PROTEIN: 7.2 G/DL (ref 6.4–8.3)
TSH SERPL DL<=0.05 MIU/L-ACNC: 1.56 UIU/ML (ref 0.3–5)
VITAMIN B-12: 544 PG/ML (ref 232–1245)
VITAMIN D 25-HYDROXY: 27.6 NG/ML
WBC # BLD: 5.8 K/UL (ref 3.5–11.3)

## 2022-11-21 PROCEDURE — 82306 VITAMIN D 25 HYDROXY: CPT

## 2022-11-21 PROCEDURE — 84443 ASSAY THYROID STIM HORMONE: CPT

## 2022-11-21 PROCEDURE — 99215 OFFICE O/P EST HI 40 MIN: CPT | Performed by: NURSE PRACTITIONER

## 2022-11-21 PROCEDURE — G8417 CALC BMI ABV UP PARAM F/U: HCPCS | Performed by: NURSE PRACTITIONER

## 2022-11-21 PROCEDURE — 36415 COLL VENOUS BLD VENIPUNCTURE: CPT

## 2022-11-21 PROCEDURE — 85025 COMPLETE CBC W/AUTO DIFF WBC: CPT

## 2022-11-21 PROCEDURE — 1036F TOBACCO NON-USER: CPT | Performed by: NURSE PRACTITIONER

## 2022-11-21 PROCEDURE — G8427 DOCREV CUR MEDS BY ELIG CLIN: HCPCS | Performed by: NURSE PRACTITIONER

## 2022-11-21 PROCEDURE — G8484 FLU IMMUNIZE NO ADMIN: HCPCS | Performed by: NURSE PRACTITIONER

## 2022-11-21 PROCEDURE — 71250 CT THORAX DX C-: CPT

## 2022-11-21 PROCEDURE — 93000 ELECTROCARDIOGRAM COMPLETE: CPT | Performed by: NURSE PRACTITIONER

## 2022-11-21 PROCEDURE — 82746 ASSAY OF FOLIC ACID SERUM: CPT

## 2022-11-21 PROCEDURE — 82607 VITAMIN B-12: CPT

## 2022-11-21 PROCEDURE — 80053 COMPREHEN METABOLIC PANEL: CPT

## 2022-11-21 RX ORDER — UBROGEPANT 100 MG/1
1 TABLET ORAL DAILY PRN
Qty: 3 TABLET | Refills: 0 | COMMUNITY
Start: 2022-11-21 | End: 2022-11-24

## 2022-11-21 RX ORDER — PROPRANOLOL HCL 60 MG
60 CAPSULE, EXTENDED RELEASE 24HR ORAL DAILY
Qty: 30 CAPSULE | Refills: 3 | Status: SHIPPED | OUTPATIENT
Start: 2022-11-21

## 2022-11-21 RX ORDER — SUMATRIPTAN 25 MG/1
25 TABLET, FILM COATED ORAL
Qty: 9 TABLET | Refills: 5 | Status: SHIPPED | OUTPATIENT
Start: 2022-11-21 | End: 2022-11-21

## 2022-11-21 ASSESSMENT — PATIENT HEALTH QUESTIONNAIRE - PHQ9
2. FEELING DOWN, DEPRESSED OR HOPELESS: 3
SUM OF ALL RESPONSES TO PHQ9 QUESTIONS 1 & 2: 3
1. LITTLE INTEREST OR PLEASURE IN DOING THINGS: 0
6. FEELING BAD ABOUT YOURSELF - OR THAT YOU ARE A FAILURE OR HAVE LET YOURSELF OR YOUR FAMILY DOWN: 0
10. IF YOU CHECKED OFF ANY PROBLEMS, HOW DIFFICULT HAVE THESE PROBLEMS MADE IT FOR YOU TO DO YOUR WORK, TAKE CARE OF THINGS AT HOME, OR GET ALONG WITH OTHER PEOPLE: 1
3. TROUBLE FALLING OR STAYING ASLEEP: 3
7. TROUBLE CONCENTRATING ON THINGS, SUCH AS READING THE NEWSPAPER OR WATCHING TELEVISION: 1
SUM OF ALL RESPONSES TO PHQ QUESTIONS 1-9: 13
SUM OF ALL RESPONSES TO PHQ QUESTIONS 1-9: 13
8. MOVING OR SPEAKING SO SLOWLY THAT OTHER PEOPLE COULD HAVE NOTICED. OR THE OPPOSITE, BEING SO FIGETY OR RESTLESS THAT YOU HAVE BEEN MOVING AROUND A LOT MORE THAN USUAL: 0
5. POOR APPETITE OR OVEREATING: 3
SUM OF ALL RESPONSES TO PHQ QUESTIONS 1-9: 13
9. THOUGHTS THAT YOU WOULD BE BETTER OFF DEAD, OR OF HURTING YOURSELF: 0
SUM OF ALL RESPONSES TO PHQ QUESTIONS 1-9: 13
4. FEELING TIRED OR HAVING LITTLE ENERGY: 3

## 2022-11-21 ASSESSMENT — ANXIETY QUESTIONNAIRES
5. BEING SO RESTLESS THAT IT IS HARD TO SIT STILL: 0
3. WORRYING TOO MUCH ABOUT DIFFERENT THINGS: 0
GAD7 TOTAL SCORE: 7
7. FEELING AFRAID AS IF SOMETHING AWFUL MIGHT HAPPEN: 0
2. NOT BEING ABLE TO STOP OR CONTROL WORRYING: 0
IF YOU CHECKED OFF ANY PROBLEMS ON THIS QUESTIONNAIRE, HOW DIFFICULT HAVE THESE PROBLEMS MADE IT FOR YOU TO DO YOUR WORK, TAKE CARE OF THINGS AT HOME, OR GET ALONG WITH OTHER PEOPLE: NOT DIFFICULT AT ALL
6. BECOMING EASILY ANNOYED OR IRRITABLE: 1
4. TROUBLE RELAXING: 3
1. FEELING NERVOUS, ANXIOUS, OR ON EDGE: 3

## 2022-11-21 ASSESSMENT — ENCOUNTER SYMPTOMS: NAUSEA: 1

## 2022-11-21 NOTE — PATIENT INSTRUCTIONS
PLEASE NOTE THAT ANY DISCONTINUATION OF MEDICATIONS OR MEDICAL SUPPLIES REFLECTED IN TODAY'S VISIT SUMMARY  MAY NOT HAVE COMPLETED AS A CHANGE IN YOUR PLAN OF CARE. THESE CHANGES MAY HAVE ONLY BEEN DONE SO IN ORDER TO CLEAN UP LIST FROM DUPLICATIONS OR MISCELLANEOUS SUPPLIES ONLY NEEDED PERIODIC REORDERS. DO NOT DISCONTINUE MEDICATIONS LISTED UNLESS SPECIFICALLY DISCUSSED IN YOUR APPOINTMENT WITH PROVIDER OR SPECIALIST, IF YOU HAVE AN QUESTIONS, PLEASE CONTACT YOUR PROVIDER FOR CLARIFICATION IF NOT ADDRESSED IN YOUR PLAN OF CARE. It was my pleasure to meet with you today. Please contact me with any questions or concerns, and please notify myself or our manager if there is anyway we can improve our service in your health care needs.  Below I have listed some instructions and information that pertain to today's visit.    -You have been advised to continue all current medication, otherwise not discussed in today's visit  -New medications and refills will been sent and made available at pharmacy or mail away  -Heathy daily diet to include low carbohydrate, low sugar diabetic diet  -Drink 6-8 glasses of water daily  -Complete  non-fasting labs and/any other testing ordered prior to next scheduled followup

## 2022-11-21 NOTE — PROGRESS NOTES
[address] [facility]   [facility] Remy More  [phone]    2022     CHIEF COMPLAINT:     Massiel Acosta (:  1990) is a 34 y.o. female, here for evaluation of the following chief complaint(s):  Headache (Pt states her headaches have been getting worse, vomiting, having a headache every single day lasting all day) and Obesity (Pt would like to discuss her weight and options with provider)      REVIEWED INFORMATION      Allergies   Allergen Reactions    Eric David [Tofacitinib] Anaphylaxis and Other (See Comments)     gastritis    Ceclor [Cefaclor] Hives    Cephalosporins      hives       Current Outpatient Medications   Medication Sig Dispense Refill    propranolol (INDERAL LA) 60 MG extended release capsule Take 1 capsule by mouth daily 30 capsule 3    SUMAtriptan (IMITREX) 25 MG tablet Take 1 tablet by mouth once as needed for Migraine 9 tablet 5    desvenlafaxine succinate (PRISTIQ) 25 MG TB24 extended release tablet Take 1 tablet by mouth daily *patient needs an appointment for additional refills* 90 tablet 1    topiramate (TOPAMAX) 50 MG tablet TAKE 1 TABLET BY MOUTH TWICE A DAY 60 tablet 0    hydrOXYzine HCl (ATARAX) 25 MG tablet Take 1 tablet by mouth every 8 hours as needed for Itching 45 tablet 5    butalbital-acetaminophen-caffeine (FIORICET, ESGIC) -40 MG per tablet Take 1 tablet by mouth every 4 hours as needed for Headaches 180 tablet 3    diclofenac sodium (VOLTAREN) 1 % GEL APPLY 2 GRAMS TOPICALLY 4 TIMES DAILY AS NEEDED FOR PAIN 300 g 3    naproxen (NAPROSYN) 500 MG tablet Take 1 tablet by mouth 2 times daily (with meals) 60 tablet 5    lansoprazole (PREVACID) 30 MG delayed release capsule Take 1 capsule by mouth daily 90 capsule 1    pantoprazole (PROTONIX) 40 MG tablet Take 1 tablet by mouth 2 times daily (before meals) 60 tablet 1    Multiple Vitamin (MULTI-VITAMIN DAILY PO) Take by mouth      vitamin B-12 (CYANOCOBALAMIN) 100 MCG tablet Take 50 mcg by mouth daily      albuterol sulfate HFA (PROAIR HFA) 108 (90 Base) MCG/ACT inhaler Inhale 2 puffs into the lungs every 6 hours as needed for Wheezing 3 Inhaler 3    gabapentin (NEURONTIN) 100 MG capsule Take 1 capsule by mouth in the morning and at bedtime. (Patient not taking: No sig reported) 60 capsule 5    gabapentin (NEURONTIN) 300 MG capsule Take 1 capsule by mouth daily for 30 days. 300 mg Nightly (Patient not taking: Reported on 11/21/2022) 90 capsule 1     No current facility-administered medications for this visit. Patient Care Team:  VERONICA Barnhart CNP as PCP - General (Nurse Practitioner)  VERONICA Barnhart CNP as PCP - [facility] Empaneled Provider  Zulma Solorzano MD as Consulting Physician (Rheumatology)  Jean Pierre Duque as Consulting Physician (Obstetrics & Gynecology)  Harshil Flores MD as Surgeon (General Surgery)    REVIEW OF SYSTEMS:     Review of Systems   Constitutional:  Positive for activity change and fatigue. Gastrointestinal:  Positive for nausea. Musculoskeletal:  Positive for gait problem and joint swelling. Neurological:  Positive for dizziness and headaches. Psychiatric/Behavioral:  Positive for dysphoric mood. HISTORY OF PRESENT ILLNESS     Patient presents today for increasing migraine. Reports that she has been having difficulty with controlling her blood pressure. Noting that she has had significant elevation was treated in urgent care and given propanolol along with her Topamax for her migraines. She reports that her migraine is continued since May with no break. She has not had any improvement. Does report that she is also had an exacerbation of her uveitis for which she was treated by her ophthalmologist with increasing steroid injection. Patient also reports that she has had increasing issues with her arthralgias due to her history of juvenile arthritis. She believes that nothing has been in control.   She is also reporting chest pressure with increasing back pain. I would like patient to do an EKG today ordering a chest CT due to history of pleurisy, with her chest pain. In addition I am going to order an echocardiogram with her history like to evaluate valves as I do hear a slight murmur at this point. I would also going to increase patient's propanolol to 60 mg extended release given patient sumatriptan to see if this will help control her migraines we are also referring her to a new rheumatologist for further evaluation if we are unable to have patient evaluated I would recommend that we send patient to [facility], or [facility] pending her insurance she will be having a change after the first year so this may be more affordable. Patient also reports that she has intermittent rash on her arms and on her groin section. This has been increasing    Continue with other medication at this time. PHYSICAL EXAM:     BP (!) 142/88 (Site: Right Upper Arm, Position: Sitting, Cuff Size: Large Adult)   Pulse 95   Temp 97.6 °F (36.4 °C) (Tympanic)   Ht 5' 2\" (1.575 m)   Wt 184 lb (83.5 kg)   SpO2 99%   BMI 33.65 kg/m²      Physical Exam  Vitals reviewed. Constitutional:       Appearance: Normal appearance. She is not ill-appearing. Cardiovascular:      Rate and Rhythm: Normal rate and regular rhythm. Heart sounds: Murmur heard. No friction rub. No gallop. Pulmonary:      Effort: Pulmonary effort is normal. No respiratory distress. Breath sounds: No wheezing. Abdominal:      General: Bowel sounds are normal.      Palpations: Abdomen is soft. Tenderness: There is no abdominal tenderness. Musculoskeletal:      Right lower leg: No edema. Left lower leg: No edema. Skin:     General: Skin is warm. Findings: No erythema, lesion or rash. Neurological:      Mental Status: She is alert. Psychiatric:         Mood and Affect: Mood normal.         Behavior: Behavior is cooperative.         Maty Rice IN OFFICE TESTING/ LAB REVIEW     EKG    NSR      ASSESSMENT/PLAN/ FOLLOWUP:     PLEASE NOTE THAT ANY DISCONTINUATION OF MEDICATIONS OR MEDICAL SUPPLIES REFLECTED IN TODAY'S VISIT SUMMARY  MAY NOT HAVE COMPLETED AS A CHANGE IN YOUR PLAN OF CARE. THESE CHANGES MAY HAVE ONLY BEEN DONE SO IN ORDER TO CLEAN UP LIST FROM DUPLICATIONS OR MISCELLANEOUS SUPPLIES ONLY NEEDED PERIODIC REORDERS. DO NOT DISCONTINUE MEDICATIONS LISTED UNLESS SPECIFICALLY DISCUSSED IN YOUR APPOINTMENT WITH PROVIDER OR SPECIALIST, IF YOU HAVE AN QUESTIONS, PLEASE CONTACT YOUR PROVIDER FOR CLARIFICATION IF NOT ADDRESSED IN YOUR PLAN OF CARE. 1. Other chest pain  -     EKG 12 Lead  -     Cardiac Stress Test - w/Pharm - Clinic Performed; Future  -     CT CHEST WO CONTRAST; Future  2. Juvenile rheumatoid arthritis ([facility] [address]  -     Amb External Referral To Rheumatology  -     CBC with Auto Differential; Future  -     TSH; Future  -     Vitamin B12 & Folate; Future  -     Comprehensive Metabolic Panel, Fasting; Future  3. Intractable migraine with aura without status migrainosus  -     SUMAtriptan (IMITREX) 25 MG tablet; Take 1 tablet by mouth once as needed for Migraine, Disp-9 tablet, R-5Normal  4. Elevated blood pressure reading  -     propranolol (INDERAL LA) 60 MG extended release capsule; Take 1 capsule by mouth daily, Disp-30 capsule, R-3Normal  -     CBC with Auto Differential; Future  -     TSH; Future  -     Vitamin B12 & Folate; Future  -     Comprehensive Metabolic Panel, Fasting; Future  5. Heart murmur  -     Echocardiogram complete; Future  -     CT CHEST WO CONTRAST; Future  6. History of pleurisy  -     CT CHEST WO CONTRAST; Future  7. Vitamin D deficiency  -     Vitamin D 25 Hydroxy; Future  8. B12 deficiency  -     Vitamin B12 & Folate; Future  9. Fatigue, unspecified type  -     CBC with Auto Differential; Future  -     TSH; Future      No follow-ups on file.     COMMUNICATION:       On this date 11/21/2022 I have spent 40 minutes reviewing previous notes, test results and face to face with the patient discussing the diagnosis and importance of compliance with the treatment plan as well as documenting on the day of the visit. The best way to find yourself is to lose yourself in the service of others - [address] [place] [address]. [address] [facility]   [email or web address]  Office: [phone]     An electronic signature was used to authenticate this note.   Signed by VERONICA Schuster CNP, APRN-CNP on 11/21/2022 at 9:19 AM

## 2022-11-29 ENCOUNTER — HOSPITAL ENCOUNTER (OUTPATIENT)
Dept: NON INVASIVE DIAGNOSTICS | Age: 32
Discharge: HOME | End: 2022-12-01
Payer: COMMERCIAL

## 2022-11-29 DIAGNOSIS — R01.1 HEART MURMUR: ICD-10-CM

## 2022-11-29 PROCEDURE — 93306 TTE W/DOPPLER COMPLETE: CPT

## 2022-12-03 DIAGNOSIS — G43.119 INTRACTABLE MIGRAINE WITH AURA WITHOUT STATUS MIGRAINOSUS: ICD-10-CM

## 2022-12-05 NOTE — TELEPHONE ENCOUNTER
Request for topamax.     Last script sent: 10/25/22  Last OV: 11/21/22  Next Visit Date:  Future Appointments   Date Time Provider Miryam Debra   12/6/2022  8:00 AM [facility] [place] NM ROOM [facility] PB NM [facility] [place]   12/6/2022  9:00 AM [facility] [place] NM ROOM [facility] PB NM [facility] [place]   12/6/2022  9:15 AM SCHEDULE, [facility] STRESS RM 1 [facility] PB STR [facility] [place]   12/6/2022 10:15 AM [facility] [place] NM ROOM [facility] PB NM [facility] [place]   1/3/2023  2:30 PM VERONICA Lui - CNP [facility] [address] Maintenance   Topic Date Due    Flu vaccine (1) 11/21/2023 (Originally 8/1/2022)    COVID-19 Vaccine (1) 11/21/2023 (Originally 1990)    Depression Monitoring  11/21/2023    DTaP/Tdap/Td vaccine (2 - Td or Tdap) 08/15/2026    Hepatitis C screen  Completed    HIV screen  Completed    Hepatitis A vaccine  Aged Out    Hib vaccine  Aged Out    Meningococcal (ACWY) vaccine  Aged Out    Pneumococcal 0-64 years Vaccine  Aged Out    Varicella vaccine  Discontinued       Hemoglobin A1C (%)   Date Value   04/15/2021 4.9   07/02/2018 4.8   10/19/2017 4.8             ( goal A1C is < 7)   No results found for: LABMICR  LDL Cholesterol (mg/dL)   Date Value   04/15/2021 108       (goal LDL is <100)   AST (U/L)   Date Value   11/21/2022 15     ALT (U/L)   Date Value   11/21/2022 16     BUN (mg/dL)   Date Value   11/21/2022 12     BP Readings from Last 3 Encounters:   11/21/22 (!) 142/88   01/04/22 136/88   10/21/21 135/85          (goal 120/80)    All Future Testing planned in [facility]  Lab Frequency Next Occurrence   CT KIDNEY WO CONTRAST Once 02/08/2022   Cardiac Stress Test - w/Pharm - Clinic Performed Once 11/21/2022         Patient Active Problem List:     Juvenile rheumatoid arthritis ([facility] [address]     Post traumatic stress disorder (PTSD)     History of sepsis     Hyperglycemia     Chronic bilateral low back pain with sciatica     Lumbar disc disease     Chronic nonintractable headache     Depression     Anxiety

## 2022-12-06 ENCOUNTER — HOSPITAL ENCOUNTER (OUTPATIENT)
Dept: NON INVASIVE DIAGNOSTICS | Age: 32
Discharge: HOME | End: 2022-12-08
Payer: COMMERCIAL

## 2022-12-06 ENCOUNTER — HOSPITAL ENCOUNTER (OUTPATIENT)
Dept: NUCLEAR MEDICINE | Age: 32
Discharge: HOME | End: 2022-12-08
Payer: COMMERCIAL

## 2022-12-06 ENCOUNTER — HOSPITAL ENCOUNTER (OUTPATIENT)
Age: 32
Discharge: HOME | End: 2022-12-06
Payer: COMMERCIAL

## 2022-12-06 VITALS — RESPIRATION RATE: 16 BRPM | DIASTOLIC BLOOD PRESSURE: 78 MMHG | SYSTOLIC BLOOD PRESSURE: 111 MMHG | HEART RATE: 102 BPM

## 2022-12-06 DIAGNOSIS — R07.89 OTHER CHEST PAIN: ICD-10-CM

## 2022-12-06 LAB
ABSOLUTE EOS #: 0.11 K/UL (ref 0–0.44)
ABSOLUTE IMMATURE GRANULOCYTE: <0.03 K/UL (ref 0–0.3)
ABSOLUTE LYMPH #: 1.79 K/UL (ref 1.1–3.7)
ABSOLUTE MONO #: 0.33 K/UL (ref 0.1–1.2)
ALBUMIN SERPL-MCNC: 4.2 G/DL (ref 3.5–5.2)
ALBUMIN/GLOBULIN RATIO: 1.6 (ref 1–2.5)
ALP BLD-CCNC: 100 U/L (ref 35–104)
ALT SERPL-CCNC: 12 U/L (ref 5–33)
ANGIOTENSIN-CONVERTING ENZYME: 14 U/L (ref 8–52)
ANION GAP SERPL CALCULATED.3IONS-SCNC: 14 MMOL/L (ref 9–17)
AST SERPL-CCNC: 13 U/L
BASOPHILS # BLD: 1 % (ref 0–2)
BASOPHILS ABSOLUTE: 0.04 K/UL (ref 0–0.2)
BILIRUB SERPL-MCNC: 0.3 MG/DL (ref 0.3–1.2)
BUN BLDV-MCNC: 13 MG/DL (ref 6–20)
CALCIUM SERPL-MCNC: 9 MG/DL (ref 8.6–10.4)
CHLORIDE BLD-SCNC: 107 MMOL/L (ref 98–107)
CHOLESTEROL/HDL RATIO: 3.4
CHOLESTEROL: 185 MG/DL
CO2: 21 MMOL/L (ref 20–31)
CREAT SERPL-MCNC: 0.78 MG/DL (ref 0.5–0.9)
EOSINOPHILS RELATIVE PERCENT: 2 % (ref 1–4)
GFR SERPL CREATININE-BSD FRML MDRD: >60 ML/MIN/1.73M2
GLUCOSE BLD-MCNC: 85 MG/DL (ref 70–99)
HCT VFR BLD CALC: 39.4 % (ref 36.3–47.1)
HDLC SERPL-MCNC: 54 MG/DL
HEMOGLOBIN: 13.3 G/DL (ref 11.9–15.1)
HEPATITIS B CORE TOTAL ANTIBODY: NONREACTIVE
HEPATITIS B SURFACE ANTIGEN: NONREACTIVE
IMMATURE GRANULOCYTES: 0 %
LDL CHOLESTEROL: 111 MG/DL (ref 0–130)
LYMPHOCYTES # BLD: 30 % (ref 24–43)
MCH RBC QN AUTO: 29.2 PG (ref 25.2–33.5)
MCHC RBC AUTO-ENTMCNC: 33.8 G/DL (ref 28.4–34.8)
MCV RBC AUTO: 86.6 FL (ref 82.6–102.9)
MONOCYTES # BLD: 5 % (ref 3–12)
NRBC AUTOMATED: 0 PER 100 WBC
PDW BLD-RTO: 12.9 % (ref 11.8–14.4)
PLATELET # BLD: 201 K/UL (ref 138–453)
PMV BLD AUTO: 10.1 FL (ref 8.1–13.5)
POTASSIUM SERPL-SCNC: 4.1 MMOL/L (ref 3.7–5.3)
RBC # BLD: 4.55 M/UL (ref 3.95–5.11)
SEG NEUTROPHILS: 62 % (ref 36–65)
SEGMENTED NEUTROPHILS ABSOLUTE COUNT: 3.78 K/UL (ref 1.5–8.1)
SODIUM BLD-SCNC: 142 MMOL/L (ref 135–144)
TOTAL PROTEIN: 6.8 G/DL (ref 6.4–8.3)
TRIGL SERPL-MCNC: 100 MG/DL
WBC # BLD: 6.1 K/UL (ref 3.5–11.3)

## 2022-12-06 PROCEDURE — 78452 HT MUSCLE IMAGE SPECT MULT: CPT

## 2022-12-06 PROCEDURE — 85025 COMPLETE CBC W/AUTO DIFF WBC: CPT

## 2022-12-06 PROCEDURE — 82164 ANGIOTENSIN I ENZYME TEST: CPT

## 2022-12-06 PROCEDURE — 86225 DNA ANTIBODY NATIVE: CPT

## 2022-12-06 PROCEDURE — 80061 LIPID PANEL: CPT

## 2022-12-06 PROCEDURE — 86038 ANTINUCLEAR ANTIBODIES: CPT

## 2022-12-06 PROCEDURE — 86812 HLA TYPING A B OR C: CPT

## 2022-12-06 PROCEDURE — 86200 CCP ANTIBODY: CPT

## 2022-12-06 PROCEDURE — 6360000002 HC RX W HCPCS: Performed by: NURSE PRACTITIONER

## 2022-12-06 PROCEDURE — 86480 TB TEST CELL IMMUN MEASURE: CPT

## 2022-12-06 PROCEDURE — 3430000000 HC RX DIAGNOSTIC RADIOPHARMACEUTICAL: Performed by: NURSE PRACTITIONER

## 2022-12-06 PROCEDURE — A9500 TC99M SESTAMIBI: HCPCS | Performed by: NURSE PRACTITIONER

## 2022-12-06 PROCEDURE — 86704 HEP B CORE ANTIBODY TOTAL: CPT

## 2022-12-06 PROCEDURE — 36415 COLL VENOUS BLD VENIPUNCTURE: CPT

## 2022-12-06 PROCEDURE — 93017 CV STRESS TEST TRACING ONLY: CPT

## 2022-12-06 PROCEDURE — 2580000003 HC RX 258: Performed by: NURSE PRACTITIONER

## 2022-12-06 PROCEDURE — 87340 HEPATITIS B SURFACE AG IA: CPT

## 2022-12-06 PROCEDURE — 80053 COMPREHEN METABOLIC PANEL: CPT

## 2022-12-06 RX ORDER — SODIUM CHLORIDE 9 MG/ML
500 INJECTION, SOLUTION INTRAVENOUS CONTINUOUS PRN
Status: DISCONTINUED | OUTPATIENT
Start: 2022-12-06 | End: 2022-12-06 | Stop reason: ALTCHOICE

## 2022-12-06 RX ORDER — TECHNETIUM TC-99M SESTAMIBI 1 MG/10ML
15 INJECTION INTRAVENOUS
Status: COMPLETED | OUTPATIENT
Start: 2022-12-06 | End: 2022-12-06

## 2022-12-06 RX ORDER — SODIUM CHLORIDE 0.9 % (FLUSH) 0.9 %
10 SYRINGE (ML) INJECTION ONCE
Status: COMPLETED | OUTPATIENT
Start: 2022-12-06 | End: 2022-12-06

## 2022-12-06 RX ORDER — AMINOPHYLLINE DIHYDRATE 25 MG/ML
50 INJECTION, SOLUTION INTRAVENOUS PRN
Status: DISCONTINUED | OUTPATIENT
Start: 2022-12-06 | End: 2022-12-06 | Stop reason: ALTCHOICE

## 2022-12-06 RX ORDER — TECHNETIUM TC-99M SESTAMIBI 1 MG/10ML
35 INJECTION INTRAVENOUS
Status: COMPLETED | OUTPATIENT
Start: 2022-12-06 | End: 2022-12-06

## 2022-12-06 RX ORDER — ATROPINE SULFATE 0.1 MG/ML
0.5 INJECTION INTRAVENOUS EVERY 5 MIN PRN
Status: DISCONTINUED | OUTPATIENT
Start: 2022-12-06 | End: 2022-12-06 | Stop reason: ALTCHOICE

## 2022-12-06 RX ORDER — METOPROLOL TARTRATE 5 MG/5ML
5 INJECTION INTRAVENOUS EVERY 5 MIN PRN
Status: DISCONTINUED | OUTPATIENT
Start: 2022-12-06 | End: 2022-12-06 | Stop reason: ALTCHOICE

## 2022-12-06 RX ORDER — TOPIRAMATE 50 MG/1
50 TABLET, FILM COATED ORAL 2 TIMES DAILY
Qty: 60 TABLET | Refills: 5 | Status: SHIPPED | OUTPATIENT
Start: 2022-12-06

## 2022-12-06 RX ORDER — NITROGLYCERIN 0.4 MG/1
0.4 TABLET SUBLINGUAL EVERY 5 MIN PRN
Status: DISCONTINUED | OUTPATIENT
Start: 2022-12-06 | End: 2022-12-06 | Stop reason: ALTCHOICE

## 2022-12-06 RX ORDER — SODIUM CHLORIDE 0.9 % (FLUSH) 0.9 %
5-40 SYRINGE (ML) INJECTION PRN
Status: DISCONTINUED | OUTPATIENT
Start: 2022-12-06 | End: 2022-12-06 | Stop reason: ALTCHOICE

## 2022-12-06 RX ORDER — ALBUTEROL SULFATE 90 UG/1
2 AEROSOL, METERED RESPIRATORY (INHALATION) PRN
Status: DISCONTINUED | OUTPATIENT
Start: 2022-12-06 | End: 2022-12-06 | Stop reason: ALTCHOICE

## 2022-12-06 RX ADMIN — TETRAKIS(2-METHOXYISOBUTYLISOCYANIDE)COPPER(I) TETRAFLUOROBORATE 37.5 MILLICURIE: 1 INJECTION, POWDER, LYOPHILIZED, FOR SOLUTION INTRAVENOUS at 09:50

## 2022-12-06 RX ADMIN — REGADENOSON 0.4 MG: 0.08 INJECTION, SOLUTION INTRAVENOUS at 09:55

## 2022-12-06 RX ADMIN — SODIUM CHLORIDE, PRESERVATIVE FREE 10 ML: 5 INJECTION INTRAVENOUS at 08:24

## 2022-12-06 RX ADMIN — TETRAKIS(2-METHOXYISOBUTYLISOCYANIDE)COPPER(I) TETRAFLUOROBORATE 13.6 MILLICURIE: 1 INJECTION, POWDER, LYOPHILIZED, FOR SOLUTION INTRAVENOUS at 08:24

## 2022-12-06 NOTE — PROGRESS NOTES
Pt completed Lexiscan stress test without issues, minor c/o of headache but improved with Pepsi. IV d/c'd.  VO to test from NP.

## 2022-12-06 NOTE — PROGRESS NOTES
Pt here for Lexiscan stress test.  Questions answered, consent obtained. Claire Lynch, NP to supervise.

## 2022-12-07 NOTE — PROCEDURES
[address]                [id number] [address] [facility], [facility] [address]                              CARDIAC STRESS TEST    PATIENT NAME: Karly Stapleton                :        1990  MED REC NO:   [id number]                             ROOM:  ACCOUNT NO:   [de-identified]                           ADMIT DATE: 2022  PROVIDER:     Hayley Reyna MD    CARDIOVASCULAR DIAGNOSTIC DEPARTMENT    DATE OF STUDY:  2022    ORDERING PROVIDER:  MICHEAL Chance  PRIMARY CARE PROVIDER:  MICHEAL Chance  INTERPRETING PHYSICIAN:  MD Sumeet MUNGUIA APRN-CNP STRESS TESTING    TEST TYPE: LEXISCAN CARDIOLYTE STRESS TEST  INDICATION: CHEST PAIN  RESTING HEART RATE: 86 bpm  RESTING BLOOD PRESSURE: 119/83 mmHg  PEAK HEART RATE: 129 bpm  PEAK BLOOD PRESSURE: 147/85 mmHg  MEDICATION(S) GIVEN: 0.4 mg IV LEXISCAN. PO CAFFEINE. REASON FOR TERMINATION: MEDICATION INFUSION COMPLETE  SYMPTOMS: Click or tap here to enter text. POST INJECTION. RESOLVED IN  RECOVERY. RESTING EKG: NORMAL. STRESS HEART RESPONSE: NORMAL RESPONSE.  BLOOD PRESSURE RESPONSE: APPROPRIATE. STRESS EKGs: NORMAL. CHEST DISCOMFORT: NO PAIN DURING STRESS. NO PAIN DURING RECOVERY. ISCHEMIC EKG CHANGES: NONE.  EKG IMPRESSION: NEGATIVE ELECTROCARDIOGRAPHICALLY LEXISCAN STRESS TEST. NUCLEAR SCAN TO FOLLOW.       Niko Alston MD    D: 2022 9:35:22       T: 2022 9:36:44     WILLIAM  Job#: [id number]     Doc#: Unknown    CC:    (Retain this field even if not dictated or not decipherable)

## 2022-12-08 LAB
HLA B27: NEGATIVE
QUANTI TB GOLD PLUS: NEGATIVE
QUANTI TB1 MINUS NIL: 0.01 IU/ML (ref 0–0.34)
QUANTI TB2 MINUS NIL: 0.01 IU/ML (ref 0–0.34)
QUANTIFERON MITOGEN: >10 IU/ML
QUANTIFERON NIL: 0.03 IU/ML

## 2022-12-09 LAB
ANTI DNA DOUBLE STRANDED: <0.5 IU/ML
ANTI-NUCLEAR ANTIBODY (ANA): NEGATIVE
CCP IGG ANTIBODIES: <0.4 U/ML (ref 0–7)
ENA ANTIBODIES SCREEN: 0.1 U/ML

## 2023-01-03 ENCOUNTER — OFFICE VISIT (OUTPATIENT)
Dept: FAMILY MEDICINE CLINIC | Age: 33
End: 2023-01-03
Payer: COMMERCIAL

## 2023-01-03 VITALS
HEIGHT: 62 IN | HEART RATE: 96 BPM | WEIGHT: 188 LBS | OXYGEN SATURATION: 96 % | BODY MASS INDEX: 34.6 KG/M2 | DIASTOLIC BLOOD PRESSURE: 88 MMHG | TEMPERATURE: 99.6 F | SYSTOLIC BLOOD PRESSURE: 126 MMHG

## 2023-01-03 DIAGNOSIS — G43.909 MIGRAINE WITHOUT STATUS MIGRAINOSUS, NOT INTRACTABLE, UNSPECIFIED MIGRAINE TYPE: Primary | ICD-10-CM

## 2023-01-03 PROCEDURE — G8427 DOCREV CUR MEDS BY ELIG CLIN: HCPCS | Performed by: NURSE PRACTITIONER

## 2023-01-03 PROCEDURE — G8417 CALC BMI ABV UP PARAM F/U: HCPCS | Performed by: NURSE PRACTITIONER

## 2023-01-03 PROCEDURE — 1036F TOBACCO NON-USER: CPT | Performed by: NURSE PRACTITIONER

## 2023-01-03 PROCEDURE — 99214 OFFICE O/P EST MOD 30 MIN: CPT | Performed by: NURSE PRACTITIONER

## 2023-01-03 PROCEDURE — G8484 FLU IMMUNIZE NO ADMIN: HCPCS | Performed by: NURSE PRACTITIONER

## 2023-01-03 RX ORDER — ATOGEPANT 30 MG/1
30 TABLET ORAL NIGHTLY
Qty: 30 TABLET | Refills: 3 | Status: SHIPPED | OUTPATIENT
Start: 2023-01-03 | End: 2023-02-02

## 2023-01-03 RX ORDER — TOCILIZUMAB 180 MG/ML
162 INJECTION, SOLUTION SUBCUTANEOUS
COMMUNITY
Start: 2022-12-20

## 2023-01-03 ASSESSMENT — PATIENT HEALTH QUESTIONNAIRE - PHQ9
6. FEELING BAD ABOUT YOURSELF - OR THAT YOU ARE A FAILURE OR HAVE LET YOURSELF OR YOUR FAMILY DOWN: 0
3. TROUBLE FALLING OR STAYING ASLEEP: 3
SUM OF ALL RESPONSES TO PHQ QUESTIONS 1-9: 8
SUM OF ALL RESPONSES TO PHQ QUESTIONS 1-9: 8
8. MOVING OR SPEAKING SO SLOWLY THAT OTHER PEOPLE COULD HAVE NOTICED. OR THE OPPOSITE, BEING SO FIGETY OR RESTLESS THAT YOU HAVE BEEN MOVING AROUND A LOT MORE THAN USUAL: 0
SUM OF ALL RESPONSES TO PHQ QUESTIONS 1-9: 8
2. FEELING DOWN, DEPRESSED OR HOPELESS: 0
SUM OF ALL RESPONSES TO PHQ9 QUESTIONS 1 & 2: 0
10. IF YOU CHECKED OFF ANY PROBLEMS, HOW DIFFICULT HAVE THESE PROBLEMS MADE IT FOR YOU TO DO YOUR WORK, TAKE CARE OF THINGS AT HOME, OR GET ALONG WITH OTHER PEOPLE: 1
1. LITTLE INTEREST OR PLEASURE IN DOING THINGS: 0
4. FEELING TIRED OR HAVING LITTLE ENERGY: 3
9. THOUGHTS THAT YOU WOULD BE BETTER OFF DEAD, OR OF HURTING YOURSELF: 0
SUM OF ALL RESPONSES TO PHQ QUESTIONS 1-9: 8
7. TROUBLE CONCENTRATING ON THINGS, SUCH AS READING THE NEWSPAPER OR WATCHING TELEVISION: 1
5. POOR APPETITE OR OVEREATING: 1

## 2023-01-03 ASSESSMENT — ANXIETY QUESTIONNAIRES
4. TROUBLE RELAXING: 2
GAD7 TOTAL SCORE: 7
2. NOT BEING ABLE TO STOP OR CONTROL WORRYING: 1
6. BECOMING EASILY ANNOYED OR IRRITABLE: 1
3. WORRYING TOO MUCH ABOUT DIFFERENT THINGS: 0
IF YOU CHECKED OFF ANY PROBLEMS ON THIS QUESTIONNAIRE, HOW DIFFICULT HAVE THESE PROBLEMS MADE IT FOR YOU TO DO YOUR WORK, TAKE CARE OF THINGS AT HOME, OR GET ALONG WITH OTHER PEOPLE: NOT DIFFICULT AT ALL
7. FEELING AFRAID AS IF SOMETHING AWFUL MIGHT HAPPEN: 0
5. BEING SO RESTLESS THAT IT IS HARD TO SIT STILL: 2
1. FEELING NERVOUS, ANXIOUS, OR ON EDGE: 1

## 2023-01-03 NOTE — PROGRESS NOTES
[address] [facility]   [address]   [phone]    1/3/2023     CHIEF COMPLAINT:     Gerhardt Sawyer (:  1990) is a 34 y.o. female, here for evaluation of the following chief complaint(s):  Blood Pressure Check (F/u)      REVIEWED INFORMATION      Allergies   Allergen Reactions    Soledad Omar [Tofacitinib] Anaphylaxis and Other (See Comments)     gastritis    Ceclor [Cefaclor] Hives    Cephalosporins      hives       Current Outpatient Medications   Medication Sig Dispense Refill    ACTEMRA ACTPEN 162 MG/0.9ML SOAJ injection Inject 162 mg into the skin every 14 days      Atogepant (QULIPTA) 30 MG TABS Take 30 mg by mouth at bedtime 30 tablet 3    propranolol (INDERAL LA) 60 MG extended release capsule Take 1 capsule by mouth daily 30 capsule 3    SUMAtriptan (IMITREX) 25 MG tablet Take 1 tablet by mouth once as needed for Migraine 9 tablet 5    desvenlafaxine succinate (PRISTIQ) 25 MG TB24 extended release tablet Take 1 tablet by mouth daily *patient needs an appointment for additional refills* 90 tablet 1    hydrOXYzine HCl (ATARAX) 25 MG tablet Take 1 tablet by mouth every 8 hours as needed for Itching 45 tablet 5    diclofenac sodium (VOLTAREN) 1 % GEL APPLY 2 GRAMS TOPICALLY 4 TIMES DAILY AS NEEDED FOR PAIN 300 g 3    lansoprazole (PREVACID) 30 MG delayed release capsule Take 1 capsule by mouth daily 90 capsule 1    pantoprazole (PROTONIX) 40 MG tablet Take 1 tablet by mouth 2 times daily (before meals) 60 tablet 1    Multiple Vitamin (MULTI-VITAMIN DAILY PO) Take by mouth      vitamin B-12 (CYANOCOBALAMIN) 100 MCG tablet Take 50 mcg by mouth daily      albuterol sulfate HFA (PROAIR HFA) 108 (90 Base) MCG/ACT inhaler Inhale 2 puffs into the lungs every 6 hours as needed for Wheezing 3 Inhaler 3    naproxen (NAPROSYN) 500 MG tablet Take 1 tablet by mouth 2 times daily (with meals) 60 tablet 5     No current facility-administered medications for this visit.         Patient Care Team:  VERONICA Carrillo CNP as PCP - General (Nurse Practitioner)  VERONICA Carrillo CNP as PCP - [facility]  Lucian Horne MD as Consulting Physician (Rheumatology)  Raven Crow as Consulting Physician (Obstetrics & Gynecology)  Kayli Zuniga MD as Surgeon (General Surgery)    REVIEW OF SYSTEMS:     Review of Systems    HISTORY OF PRESENT ILLNESS     Hypertension  Blood pressure evaluated today is stable. Medication review has been completed as documented. Patient is currently taking medication as prescribed or as indicated. Patient denies any chest pain, shortness of breath, or palpitations. Patient is overall stable. Medication currently include: Propanolol, continuation of these medications discussed, with refills given at appointment, or when requested. Patient continues to have migraines. She does report that she had about a 3-day period in which she was able to be migraine free. She does report that she does take the sumatriptan how it makes her extremely drowsy and tired. We have her on Topamax 50 mg twice daily, as well as propanolol. With sumatriptan as an antimigraine medication. We did discuss switching her over to q.lipta. I have given her a Topamax titration protocol. Patient is also following with rheumatology. She reports that she has started injections for her history of childhood rheumatoid arthritis. She does have a few questions in regards to her overall endocrine health. We do have some considerations that patient may have some elevation of her cortisol levels I will discuss whether or not testing would be good we did discuss utilizing dental pathic medicine and information has been given    And patient's primary concern is her continued fatigue, inability to lose weight. She seems to be bouncing back and forth continuous rebound anxiety symptoms. As well as her continued migraine    She reports that she ha uder gone injection. No change in pain. Patient will be doing labs every 3 months   Migraines continue despite usage     PHYSICAL EXAM:     /88 (Site: Left Upper Arm, Position: Sitting, Cuff Size: Large Adult)   Pulse 96   Temp 99.6 °F (37.6 °C) (Tympanic)   Ht 5' 2\" (1.575 m)   Wt 188 lb (85.3 kg)   SpO2 96%   BMI 34.39 kg/m²      Physical Exam  Constitutional:       Appearance: Normal appearance. She is well-developed. She is not ill-appearing. Eyes:      General:         Right eye: No discharge. Left eye: No discharge. Extraocular Movements: Extraocular movements intact. Conjunctiva/sclera: Conjunctivae normal.   Neck:      Thyroid: No thyroid mass. Vascular: No JVD. Pulmonary:      Effort: Pulmonary effort is normal. No respiratory distress. Musculoskeletal:      Right shoulder: No deformity. Normal range of motion. Left shoulder: No deformity. Normal range of motion. Cervical back: Normal range of motion. Skin:     General: Skin is moist.      Coloration: Skin is not cyanotic or jaundiced. Findings: No rash. Neurological:      General: No focal deficit present. Mental Status: She is alert and oriented to person, place, and time. Gait: Gait is intact. Psychiatric:         Attention and Perception: Attention normal. She does not perceive auditory or visual hallucinations. Mood and Affect: Mood normal.         Speech: Speech normal.        PROCEDURE/ IN OFFICE TESTING/ LAB REVIEW     No in office testing or procedures completed during today's office visit. No results found for this or any previous visit (from the past 168 hour(s)). ASSESSMENT/PLAN/ FOLLOWUP:     PLEASE NOTE THAT ANY DISCONTINUATION OF MEDICATIONS OR MEDICAL SUPPLIES REFLECTED IN TODAY'S VISIT SUMMARY  MAY NOT HAVE COMPLETED AS A CHANGE IN YOUR PLAN OF CARE.  THESE CHANGES MAY HAVE ONLY BEEN DONE SO IN ORDER TO CLEAN UP LIST FROM DUPLICATIONS OR MISCELLANEOUS SUPPLIES ONLY NEEDED PERIODIC REORDERS. DO NOT DISCONTINUE MEDICATIONS LISTED UNLESS SPECIFICALLY DISCUSSED IN YOUR APPOINTMENT WITH PROVIDER OR SPECIALIST, IF YOU HAVE AN QUESTIONS, PLEASE CONTACT YOUR PROVIDER FOR CLARIFICATION IF NOT ADDRESSED IN YOUR PLAN OF CARE. 1. Migraine without status migrainosus, not intractable, unspecified migraine type  -     Atogepant (QULIPTA) 30 MG TABS; Take 30 mg by mouth at bedtime, Disp-30 tablet, R-3Normal    Return in about 3 months (around 4/3/2023). COMMUNICATION:       On this date 1/3/2023 I have spent 30 minutes reviewing previous notes, test results and face to face with the patient discussing the diagnosis and importance of compliance with the treatment plan as well as documenting on the day of the visit. The best way to find yourself is to lose yourself in the service of others - [address] [place] [address]. [facility]   [email or web address]  Office: [phone]     An electronic signature was used to authenticate this note.   Signed by VERONICA Carrillo CNP, APRN-CNP on 1/3/2023 at 5:25 PM

## 2023-01-03 NOTE — PATIENT INSTRUCTIONS
Topamax down to 25 mg BID for one week, then reduced down to 25 mg daily, on third week stop and only take the qulipita. Shirley Alberto

## 2023-03-03 ENCOUNTER — PATIENT MESSAGE (OUTPATIENT)
Dept: FAMILY MEDICINE CLINIC | Age: 33
End: 2023-03-03

## 2023-03-03 DIAGNOSIS — R03.0 ELEVATED BLOOD PRESSURE READING: Primary | ICD-10-CM

## 2023-03-06 RX ORDER — LOSARTAN POTASSIUM 25 MG/1
25 TABLET ORAL DAILY
Qty: 30 TABLET | Refills: 1 | Status: SHIPPED | OUTPATIENT
Start: 2023-03-06 | End: 2023-04-05

## 2023-03-06 NOTE — TELEPHONE ENCOUNTER
From: Laura Santos  Sent: 3/6/2023 2:08 PM EST  To: Kirti Eddy  Assoc Clinical Support Pool  Subject: Urgent care? Updating. BP stayed normal for about 24 hrs after the visit. I am keeping a log of BP . . highest today 151/107 lowest so far today 142/92. I have an appointment in April, unless Dr. Amanda Kim wishes to see me sooner, or has recommendations until then. I understand that this is not \"critical\" but daily high blood pressure is concerning and it is also exhausting and increasing my headaches. Is there a neurologist she would recommend that specializes in migraines? I am searching but, wanted to get a recommendation if possible. Thank you again.

## 2023-04-01 DIAGNOSIS — F33.9 EPISODE OF RECURRENT MAJOR DEPRESSIVE DISORDER, UNSPECIFIED DEPRESSION EPISODE SEVERITY (HCC): ICD-10-CM

## 2023-04-01 DIAGNOSIS — F41.9 ANXIETY: ICD-10-CM

## 2023-04-03 NOTE — TELEPHONE ENCOUNTER
Request for Pristiq and Hydroxyzine. Last script sent: Pristiq- 11/8/22 and Hydroxyzine- 9/19/22  Last OV: 1/3/23  Next Visit Date:  No future appointments.     Health Maintenance   Topic Date Due    Flu vaccine (Season Ended) 11/21/2023 (Originally 8/1/2023)    COVID-19 Vaccine (1) 11/21/2023 (Originally 1990)    Depression Monitoring  01/03/2024    DTaP/Tdap/Td vaccine (2 - Td or Tdap) 08/15/2026    Hepatitis C screen  Completed    HIV screen  Completed    Hepatitis A vaccine  Aged Out    Hib vaccine  Aged Out    Meningococcal (ACWY) vaccine  Aged Out    Pneumococcal 0-64 years Vaccine  Aged Out    Varicella vaccine  Discontinued    Cervical cancer screen  Discontinued       Hemoglobin A1C (%)   Date Value   04/15/2021 4.9   07/02/2018 4.8   10/19/2017 4.8             ( goal A1C is < 7)   No results found for: LABMICR  LDL Cholesterol (mg/dL)   Date Value   12/06/2022 111       (goal LDL is <100)   AST (U/L)   Date Value   12/06/2022 13     ALT (U/L)   Date Value   12/06/2022 12     BUN (mg/dL)   Date Value   12/06/2022 13     BP Readings from Last 3 Encounters:   01/03/23 126/88   12/06/22 111/78   11/21/22 (!) 142/88          (goal 120/80)    All Future Testing planned in [facility]  Lab Frequency Next Occurrence         Patient Active Problem List:     Juvenile rheumatoid arthritis ([facility] [address]     Post traumatic stress disorder (PTSD)     History of sepsis     Hyperglycemia     Chronic bilateral low back pain with sciatica     Lumbar disc disease     Chronic nonintractable headache     Depression     Anxiety

## 2023-04-04 RX ORDER — DESVENLAFAXINE 25 MG/1
25 TABLET, EXTENDED RELEASE ORAL DAILY
Qty: 90 TABLET | Refills: 1 | Status: SHIPPED | OUTPATIENT
Start: 2023-04-04

## 2023-04-04 RX ORDER — HYDROXYZINE HYDROCHLORIDE 25 MG/1
25 TABLET, FILM COATED ORAL EVERY 8 HOURS PRN
Qty: 45 TABLET | Refills: 5 | Status: SHIPPED | OUTPATIENT
Start: 2023-04-04 | End: 2024-04-03

## 2023-11-26 ENCOUNTER — HOSPITAL ENCOUNTER (INPATIENT)
Age: 33
LOS: 2 days | Discharge: HOME | DRG: 661 | End: 2023-11-28
Attending: EMERGENCY MEDICINE | Admitting: FAMILY MEDICINE
Payer: COMMERCIAL

## 2023-11-26 ENCOUNTER — APPOINTMENT (OUTPATIENT)
Dept: CT IMAGING | Age: 33
DRG: 661 | End: 2023-11-26
Payer: COMMERCIAL

## 2023-11-26 DIAGNOSIS — N20.2 CALCULUS OF KIDNEY WITH CALCULUS OF URETER: Primary | ICD-10-CM

## 2023-11-26 DIAGNOSIS — N13.5 URETEROVESICAL JUNCTION (UVJ) OBSTRUCTION: ICD-10-CM

## 2023-11-26 DIAGNOSIS — N30.01 ACUTE CYSTITIS WITH HEMATURIA: ICD-10-CM

## 2023-11-26 LAB
ALBUMIN SERPL-MCNC: 4 G/DL (ref 3.5–5.2)
ALBUMIN/GLOB SERPL: 1 {RATIO} (ref 1–2.5)
ALP SERPL-CCNC: 127 U/L (ref 35–104)
ALT SERPL-CCNC: 8 U/L (ref 5–33)
ANION GAP SERPL CALCULATED.3IONS-SCNC: 13 MMOL/L (ref 9–17)
AST SERPL-CCNC: 9 U/L
BACTERIA URNS QL MICRO: ABNORMAL
BASOPHILS # BLD: 0 K/UL (ref 0–0.2)
BASOPHILS NFR BLD: 0 % (ref 0–2)
BILIRUB SERPL-MCNC: 0.5 MG/DL (ref 0.3–1.2)
BILIRUB UR QL STRIP: ABNORMAL
BUN SERPL-MCNC: 10 MG/DL (ref 6–20)
CALCIUM SERPL-MCNC: 9.1 MG/DL (ref 8.6–10.4)
CHARACTER UR: ABNORMAL
CHLORIDE SERPL-SCNC: 99 MMOL/L (ref 98–107)
CLARITY UR: ABNORMAL
CO2 SERPL-SCNC: 24 MMOL/L (ref 20–31)
COLOR UR: ABNORMAL
COMMENT: ABNORMAL
CREAT SERPL-MCNC: 1.2 MG/DL (ref 0.5–0.9)
EOSINOPHIL # BLD: 0 K/UL (ref 0–0.4)
EOSINOPHILS RELATIVE PERCENT: 0 % (ref 1–4)
EPI CELLS #/AREA URNS HPF: ABNORMAL /HPF (ref 0–5)
ERYTHROCYTE [DISTWIDTH] IN BLOOD BY AUTOMATED COUNT: 13.3 % (ref 12.5–15.4)
GFR SERPL CREATININE-BSD FRML MDRD: >60 ML/MIN/1.73M2
GLUCOSE SERPL-MCNC: 115 MG/DL (ref 70–99)
GLUCOSE UR STRIP-MCNC: ABNORMAL MG/DL
HCT VFR BLD AUTO: 33 % (ref 36–46)
HGB BLD-MCNC: 11.2 G/DL (ref 12–16)
HGB UR QL STRIP.AUTO: ABNORMAL
KETONES UR STRIP-MCNC: ABNORMAL MG/DL
LEUKOCYTE ESTERASE UR QL STRIP: ABNORMAL
LIPASE SERPL-CCNC: 16 U/L (ref 13–60)
LYMPHOCYTES NFR BLD: 1.1 K/UL (ref 1–4.8)
LYMPHOCYTES RELATIVE PERCENT: 10 % (ref 24–44)
MCH RBC QN AUTO: 27.4 PG (ref 26–34)
MCHC RBC AUTO-ENTMCNC: 33.8 G/DL (ref 31–37)
MCV RBC AUTO: 81.1 FL (ref 80–100)
MONOCYTES NFR BLD: 0.6 K/UL (ref 0.1–1.2)
MONOCYTES NFR BLD: 6 % (ref 2–11)
MUCOUS THREADS URNS QL MICRO: ABNORMAL
NEUTROPHILS NFR BLD: 84 % (ref 36–66)
NEUTS SEG NFR BLD: 9.7 K/UL (ref 1.8–7.7)
NITRITE UR QL STRIP: POSITIVE
PH UR STRIP: 6.5 [PH] (ref 5–8)
PLATELET # BLD AUTO: 305 K/UL (ref 140–450)
PMV BLD AUTO: 7.1 FL (ref 6–12)
POTASSIUM SERPL-SCNC: 3.6 MMOL/L (ref 3.7–5.3)
PROT SERPL-MCNC: 7.9 G/DL (ref 6.4–8.3)
PROT UR STRIP-MCNC: ABNORMAL MG/DL
RBC # BLD AUTO: 4.07 M/UL (ref 4–5.2)
RBC #/AREA URNS HPF: ABNORMAL /HPF (ref 0–2)
SODIUM SERPL-SCNC: 136 MMOL/L (ref 135–144)
SP GR UR STRIP: 1.02 (ref 1–1.03)
UROBILINOGEN UR STRIP-ACNC: ABNORMAL EU/DL (ref 0–1)
WBC #/AREA URNS HPF: ABNORMAL /HPF (ref 0–5)
WBC OTHER # BLD: 11.5 K/UL (ref 3.5–11)

## 2023-11-26 PROCEDURE — 80053 COMPREHEN METABOLIC PANEL: CPT

## 2023-11-26 PROCEDURE — 2500000003 HC RX 250 WO HCPCS: Performed by: PHYSICIAN ASSISTANT

## 2023-11-26 PROCEDURE — 74176 CT ABD & PELVIS W/O CONTRAST: CPT

## 2023-11-26 PROCEDURE — 2580000003 HC RX 258: Performed by: PHYSICIAN ASSISTANT

## 2023-11-26 PROCEDURE — 85025 COMPLETE CBC W/AUTO DIFF WBC: CPT

## 2023-11-26 PROCEDURE — 96375 TX/PRO/DX INJ NEW DRUG ADDON: CPT

## 2023-11-26 PROCEDURE — 99285 EMERGENCY DEPT VISIT HI MDM: CPT

## 2023-11-26 PROCEDURE — 96374 THER/PROPH/DIAG INJ IV PUSH: CPT

## 2023-11-26 PROCEDURE — 36415 COLL VENOUS BLD VENIPUNCTURE: CPT

## 2023-11-26 PROCEDURE — 1200000000 HC SEMI PRIVATE

## 2023-11-26 PROCEDURE — 6360000002 HC RX W HCPCS: Performed by: PHYSICIAN ASSISTANT

## 2023-11-26 PROCEDURE — 83690 ASSAY OF LIPASE: CPT

## 2023-11-26 PROCEDURE — 81001 URINALYSIS AUTO W/SCOPE: CPT

## 2023-11-26 PROCEDURE — 87086 URINE CULTURE/COLONY COUNT: CPT

## 2023-11-26 RX ORDER — KETOROLAC TROMETHAMINE 15 MG/ML
15 INJECTION, SOLUTION INTRAMUSCULAR; INTRAVENOUS ONCE
Status: COMPLETED | OUTPATIENT
Start: 2023-11-26 | End: 2023-11-26

## 2023-11-26 RX ORDER — 0.9 % SODIUM CHLORIDE 0.9 %
1000 INTRAVENOUS SOLUTION INTRAVENOUS ONCE
Status: COMPLETED | OUTPATIENT
Start: 2023-11-26 | End: 2023-11-27

## 2023-11-26 RX ORDER — ONDANSETRON 2 MG/ML
4 INJECTION INTRAMUSCULAR; INTRAVENOUS ONCE
Status: COMPLETED | OUTPATIENT
Start: 2023-11-26 | End: 2023-11-26

## 2023-11-26 RX ORDER — LEVOFLOXACIN 5 MG/ML
750 INJECTION, SOLUTION INTRAVENOUS ONCE
Status: COMPLETED | OUTPATIENT
Start: 2023-11-26 | End: 2023-11-27

## 2023-11-26 RX ADMIN — HYDROMORPHONE HYDROCHLORIDE 1 MG: 1 INJECTION, SOLUTION INTRAMUSCULAR; INTRAVENOUS; SUBCUTANEOUS at 22:55

## 2023-11-26 RX ADMIN — SODIUM CHLORIDE 1000 ML: 9 INJECTION, SOLUTION INTRAVENOUS at 21:15

## 2023-11-26 RX ADMIN — KETOROLAC TROMETHAMINE 15 MG: 15 INJECTION, SOLUTION INTRAMUSCULAR; INTRAVENOUS at 22:54

## 2023-11-26 RX ADMIN — ONDANSETRON 4 MG: 2 INJECTION INTRAMUSCULAR; INTRAVENOUS at 21:17

## 2023-11-26 ASSESSMENT — PAIN - FUNCTIONAL ASSESSMENT: PAIN_FUNCTIONAL_ASSESSMENT: 0-10

## 2023-11-26 ASSESSMENT — PAIN SCALES - GENERAL
PAINLEVEL_OUTOF10: 8
PAINLEVEL_OUTOF10: 7

## 2023-11-26 ASSESSMENT — PAIN DESCRIPTION - LOCATION: LOCATION: BACK

## 2023-11-27 ENCOUNTER — APPOINTMENT (OUTPATIENT)
Dept: GENERAL RADIOLOGY | Age: 33
DRG: 661 | End: 2023-11-27
Payer: COMMERCIAL

## 2023-11-27 ENCOUNTER — ANESTHESIA (OUTPATIENT)
Dept: OPERATING ROOM | Age: 33
DRG: 661 | End: 2023-11-27
Payer: COMMERCIAL

## 2023-11-27 ENCOUNTER — ANESTHESIA EVENT (OUTPATIENT)
Dept: OPERATING ROOM | Age: 33
DRG: 661 | End: 2023-11-27
Payer: COMMERCIAL

## 2023-11-27 PROBLEM — F12.90 MARIJUANA SMOKER: Status: ACTIVE | Noted: 2023-11-27

## 2023-11-27 PROBLEM — Z72.89: Status: ACTIVE | Noted: 2023-11-27

## 2023-11-27 PROBLEM — N30.01 ACUTE CYSTITIS WITH HEMATURIA: Status: ACTIVE | Noted: 2023-11-27

## 2023-11-27 PROBLEM — Z86.79 HISTORY OF RHEUMATIC FEVER AS A CHILD: Status: ACTIVE | Noted: 2023-11-27

## 2023-11-27 PROBLEM — N17.9 ACUTE KIDNEY INJURY (HCC): Status: ACTIVE | Noted: 2023-11-27

## 2023-11-27 PROBLEM — Z87.891 FORMER CIGARETTE SMOKER: Status: ACTIVE | Noted: 2023-11-27

## 2023-11-27 PROBLEM — N13.30 HYDRONEPHROSIS OF LEFT KIDNEY: Status: ACTIVE | Noted: 2023-11-27

## 2023-11-27 PROBLEM — N20.2 CALCULUS OF KIDNEY WITH CALCULUS OF URETER: Status: ACTIVE | Noted: 2023-11-27

## 2023-11-27 LAB
ALBUMIN SERPL-MCNC: 3.3 G/DL (ref 3.5–5.2)
ALBUMIN/GLOB SERPL: 1.1 {RATIO} (ref 1–2.5)
ALP SERPL-CCNC: 99 U/L (ref 35–104)
ALT SERPL-CCNC: 8 U/L (ref 5–33)
ANION GAP SERPL CALCULATED.3IONS-SCNC: 10 MMOL/L (ref 9–17)
AST SERPL-CCNC: 10 U/L
BASOPHILS # BLD: 0 K/UL (ref 0–0.2)
BASOPHILS NFR BLD: 1 % (ref 0–2)
BILIRUB SERPL-MCNC: 0.6 MG/DL (ref 0.3–1.2)
BUN SERPL-MCNC: 9 MG/DL (ref 6–20)
CALCIUM SERPL-MCNC: 8.3 MG/DL (ref 8.6–10.4)
CHLORIDE SERPL-SCNC: 103 MMOL/L (ref 98–107)
CO2 SERPL-SCNC: 22 MMOL/L (ref 20–31)
CREAT SERPL-MCNC: 1.2 MG/DL (ref 0.5–0.9)
EOSINOPHIL # BLD: 0.1 K/UL (ref 0–0.4)
EOSINOPHILS RELATIVE PERCENT: 2 % (ref 1–4)
ERYTHROCYTE [DISTWIDTH] IN BLOOD BY AUTOMATED COUNT: 13.3 % (ref 12.5–15.4)
GFR SERPL CREATININE-BSD FRML MDRD: >60 ML/MIN/1.73M2
GLUCOSE SERPL-MCNC: 108 MG/DL (ref 70–99)
HCT VFR BLD AUTO: 29.2 % (ref 36–46)
HGB BLD-MCNC: 9.9 G/DL (ref 12–16)
LACTATE BLDV-SCNC: 0.5 MMOL/L (ref 0.5–2.2)
LYMPHOCYTES NFR BLD: 1.1 K/UL (ref 1–4.8)
LYMPHOCYTES RELATIVE PERCENT: 13 % (ref 24–44)
MCH RBC QN AUTO: 27.7 PG (ref 26–34)
MCHC RBC AUTO-ENTMCNC: 34 G/DL (ref 31–37)
MCV RBC AUTO: 81.5 FL (ref 80–100)
MONOCYTES NFR BLD: 0.6 K/UL (ref 0.1–1.2)
MONOCYTES NFR BLD: 7 % (ref 2–11)
NEUTROPHILS NFR BLD: 77 % (ref 36–66)
NEUTS SEG NFR BLD: 6.8 K/UL (ref 1.8–7.7)
PLATELET # BLD AUTO: 242 K/UL (ref 140–450)
PMV BLD AUTO: 7.1 FL (ref 6–12)
POTASSIUM SERPL-SCNC: 3.6 MMOL/L (ref 3.7–5.3)
PROT SERPL-MCNC: 6.4 G/DL (ref 6.4–8.3)
RBC # BLD AUTO: 3.58 M/UL (ref 4–5.2)
SODIUM SERPL-SCNC: 135 MMOL/L (ref 135–144)
WBC OTHER # BLD: 8.7 K/UL (ref 3.5–11)

## 2023-11-27 PROCEDURE — 87040 BLOOD CULTURE FOR BACTERIA: CPT

## 2023-11-27 PROCEDURE — 6370000000 HC RX 637 (ALT 250 FOR IP)

## 2023-11-27 PROCEDURE — 83605 ASSAY OF LACTIC ACID: CPT

## 2023-11-27 PROCEDURE — 3700000000 HC ANESTHESIA ATTENDED CARE: Performed by: UROLOGY

## 2023-11-27 PROCEDURE — 3600000013 HC SURGERY LEVEL 3 ADDTL 15MIN: Performed by: UROLOGY

## 2023-11-27 PROCEDURE — 99223 1ST HOSP IP/OBS HIGH 75: CPT | Performed by: FAMILY MEDICINE

## 2023-11-27 PROCEDURE — C1769 GUIDE WIRE: HCPCS | Performed by: UROLOGY

## 2023-11-27 PROCEDURE — 36415 COLL VENOUS BLD VENIPUNCTURE: CPT

## 2023-11-27 PROCEDURE — 3700000001 HC ADD 15 MINUTES (ANESTHESIA): Performed by: UROLOGY

## 2023-11-27 PROCEDURE — 7100000000 HC PACU RECOVERY - FIRST 15 MIN: Performed by: UROLOGY

## 2023-11-27 PROCEDURE — 6360000002 HC RX W HCPCS: Performed by: PHYSICIAN ASSISTANT

## 2023-11-27 PROCEDURE — 2580000003 HC RX 258: Performed by: NURSE ANESTHETIST, CERTIFIED REGISTERED

## 2023-11-27 PROCEDURE — 0T778DZ DILATION OF LEFT URETER WITH INTRALUMINAL DEVICE, VIA NATURAL OR ARTIFICIAL OPENING ENDOSCOPIC: ICD-10-PCS | Performed by: UROLOGY

## 2023-11-27 PROCEDURE — 6360000002 HC RX W HCPCS: Performed by: NURSE ANESTHETIST, CERTIFIED REGISTERED

## 2023-11-27 PROCEDURE — 85025 COMPLETE CBC W/AUTO DIFF WBC: CPT

## 2023-11-27 PROCEDURE — 3600000003 HC SURGERY LEVEL 3 BASE: Performed by: UROLOGY

## 2023-11-27 PROCEDURE — 2580000003 HC RX 258: Performed by: UROLOGY

## 2023-11-27 PROCEDURE — C2617 STENT, NON-COR, TEM W/O DEL: HCPCS | Performed by: UROLOGY

## 2023-11-27 PROCEDURE — 2580000003 HC RX 258

## 2023-11-27 PROCEDURE — 2709999900 HC NON-CHARGEABLE SUPPLY: Performed by: UROLOGY

## 2023-11-27 PROCEDURE — 1200000000 HC SEMI PRIVATE

## 2023-11-27 PROCEDURE — 80053 COMPREHEN METABOLIC PANEL: CPT

## 2023-11-27 PROCEDURE — 7100000001 HC PACU RECOVERY - ADDTL 15 MIN: Performed by: UROLOGY

## 2023-11-27 PROCEDURE — 2500000003 HC RX 250 WO HCPCS: Performed by: NURSE ANESTHETIST, CERTIFIED REGISTERED

## 2023-11-27 PROCEDURE — 6360000002 HC RX W HCPCS

## 2023-11-27 PROCEDURE — 2580000003 HC RX 258: Performed by: ANESTHESIOLOGY

## 2023-11-27 DEVICE — URETERAL STENT
Type: IMPLANTABLE DEVICE | Site: URETER | Status: FUNCTIONAL
Brand: POLARIS™ ULTRA

## 2023-11-27 RX ORDER — UBIDECARENONE 75 MG
50 CAPSULE ORAL DAILY
Status: DISCONTINUED | OUTPATIENT
Start: 2023-11-27 | End: 2023-11-28 | Stop reason: HOSPADM

## 2023-11-27 RX ORDER — HYDROXYZINE HYDROCHLORIDE 25 MG/1
25 TABLET, FILM COATED ORAL EVERY 8 HOURS PRN
Status: DISCONTINUED | OUTPATIENT
Start: 2023-11-27 | End: 2023-11-28 | Stop reason: HOSPADM

## 2023-11-27 RX ORDER — ACETAMINOPHEN 325 MG/1
650 TABLET ORAL EVERY 6 HOURS PRN
Status: DISCONTINUED | OUTPATIENT
Start: 2023-11-27 | End: 2023-11-28 | Stop reason: HOSPADM

## 2023-11-27 RX ORDER — ONDANSETRON 2 MG/ML
4 INJECTION INTRAMUSCULAR; INTRAVENOUS EVERY 6 HOURS PRN
Status: DISCONTINUED | OUTPATIENT
Start: 2023-11-27 | End: 2023-11-28 | Stop reason: HOSPADM

## 2023-11-27 RX ORDER — LEVOFLOXACIN 750 MG/1
750 TABLET, FILM COATED ORAL DAILY
Status: DISCONTINUED | OUTPATIENT
Start: 2023-11-28 | End: 2023-11-28 | Stop reason: HOSPADM

## 2023-11-27 RX ORDER — POLYETHYLENE GLYCOL 3350 17 G/17G
17 POWDER, FOR SOLUTION ORAL DAILY PRN
Status: DISCONTINUED | OUTPATIENT
Start: 2023-11-27 | End: 2023-11-28 | Stop reason: HOSPADM

## 2023-11-27 RX ORDER — VENLAFAXINE HYDROCHLORIDE 37.5 MG/1
37.5 CAPSULE, EXTENDED RELEASE ORAL
Status: DISCONTINUED | OUTPATIENT
Start: 2023-11-27 | End: 2023-11-28 | Stop reason: HOSPADM

## 2023-11-27 RX ORDER — LIDOCAINE HYDROCHLORIDE 10 MG/ML
1 INJECTION, SOLUTION EPIDURAL; INFILTRATION; INTRACAUDAL; PERINEURAL
Status: DISCONTINUED | OUTPATIENT
Start: 2023-11-27 | End: 2023-11-27

## 2023-11-27 RX ORDER — MEPERIDINE HYDROCHLORIDE 50 MG/ML
12.5 INJECTION INTRAMUSCULAR; INTRAVENOUS; SUBCUTANEOUS EVERY 5 MIN PRN
Status: DISCONTINUED | OUTPATIENT
Start: 2023-11-27 | End: 2023-11-27

## 2023-11-27 RX ORDER — LIDOCAINE HYDROCHLORIDE 10 MG/ML
INJECTION, SOLUTION INFILTRATION; PERINEURAL PRN
Status: DISCONTINUED | OUTPATIENT
Start: 2023-11-27 | End: 2023-11-27 | Stop reason: SDUPTHER

## 2023-11-27 RX ORDER — SODIUM CHLORIDE 9 MG/ML
INJECTION, SOLUTION INTRAVENOUS PRN
Status: DISCONTINUED | OUTPATIENT
Start: 2023-11-27 | End: 2023-11-27

## 2023-11-27 RX ORDER — HYDROCODONE BITARTRATE AND ACETAMINOPHEN 5; 325 MG/1; MG/1
1 TABLET ORAL EVERY 6 HOURS PRN
Status: DISCONTINUED | OUTPATIENT
Start: 2023-11-27 | End: 2023-11-27

## 2023-11-27 RX ORDER — ACETAMINOPHEN 650 MG/1
650 SUPPOSITORY RECTAL EVERY 6 HOURS PRN
Status: DISCONTINUED | OUTPATIENT
Start: 2023-11-27 | End: 2023-11-28 | Stop reason: HOSPADM

## 2023-11-27 RX ORDER — SODIUM CHLORIDE 0.9 % (FLUSH) 0.9 %
5-40 SYRINGE (ML) INJECTION EVERY 12 HOURS SCHEDULED
Status: DISCONTINUED | OUTPATIENT
Start: 2023-11-27 | End: 2023-11-27

## 2023-11-27 RX ORDER — ONDANSETRON 2 MG/ML
4 INJECTION INTRAMUSCULAR; INTRAVENOUS
Status: DISCONTINUED | OUTPATIENT
Start: 2023-11-27 | End: 2023-11-27

## 2023-11-27 RX ORDER — SODIUM CHLORIDE 0.9 % (FLUSH) 0.9 %
5-40 SYRINGE (ML) INJECTION EVERY 12 HOURS SCHEDULED
Status: DISCONTINUED | OUTPATIENT
Start: 2023-11-27 | End: 2023-11-28 | Stop reason: HOSPADM

## 2023-11-27 RX ORDER — DEXMEDETOMIDINE HYDROCHLORIDE 100 UG/ML
INJECTION, SOLUTION INTRAVENOUS PRN
Status: DISCONTINUED | OUTPATIENT
Start: 2023-11-27 | End: 2023-11-27 | Stop reason: SDUPTHER

## 2023-11-27 RX ORDER — FENTANYL CITRATE 50 UG/ML
INJECTION, SOLUTION INTRAMUSCULAR; INTRAVENOUS PRN
Status: DISCONTINUED | OUTPATIENT
Start: 2023-11-27 | End: 2023-11-27 | Stop reason: SDUPTHER

## 2023-11-27 RX ORDER — ONDANSETRON 2 MG/ML
INJECTION INTRAMUSCULAR; INTRAVENOUS PRN
Status: DISCONTINUED | OUTPATIENT
Start: 2023-11-27 | End: 2023-11-27 | Stop reason: SDUPTHER

## 2023-11-27 RX ORDER — LEVOFLOXACIN 500 MG/1
500 TABLET, FILM COATED ORAL DAILY
Status: DISCONTINUED | OUTPATIENT
Start: 2023-11-28 | End: 2023-11-27

## 2023-11-27 RX ORDER — SODIUM CHLORIDE 0.9 % (FLUSH) 0.9 %
5-40 SYRINGE (ML) INJECTION PRN
Status: DISCONTINUED | OUTPATIENT
Start: 2023-11-27 | End: 2023-11-27

## 2023-11-27 RX ORDER — SODIUM CHLORIDE, SODIUM LACTATE, POTASSIUM CHLORIDE, CALCIUM CHLORIDE 600; 310; 30; 20 MG/100ML; MG/100ML; MG/100ML; MG/100ML
INJECTION, SOLUTION INTRAVENOUS CONTINUOUS
Status: DISCONTINUED | OUTPATIENT
Start: 2023-11-27 | End: 2023-11-27

## 2023-11-27 RX ORDER — MORPHINE SULFATE 2 MG/ML
2 INJECTION, SOLUTION INTRAMUSCULAR; INTRAVENOUS EVERY 5 MIN PRN
Status: DISCONTINUED | OUTPATIENT
Start: 2023-11-27 | End: 2023-11-27

## 2023-11-27 RX ORDER — ONDANSETRON 4 MG/1
4 TABLET, ORALLY DISINTEGRATING ORAL EVERY 8 HOURS PRN
Status: DISCONTINUED | OUTPATIENT
Start: 2023-11-27 | End: 2023-11-28 | Stop reason: HOSPADM

## 2023-11-27 RX ORDER — LABETALOL HYDROCHLORIDE 5 MG/ML
10 INJECTION, SOLUTION INTRAVENOUS
Status: DISCONTINUED | OUTPATIENT
Start: 2023-11-27 | End: 2023-11-27

## 2023-11-27 RX ORDER — DEXAMETHASONE SODIUM PHOSPHATE 10 MG/ML
INJECTION, SOLUTION INTRAMUSCULAR; INTRAVENOUS PRN
Status: DISCONTINUED | OUTPATIENT
Start: 2023-11-27 | End: 2023-11-27 | Stop reason: SDUPTHER

## 2023-11-27 RX ORDER — POTASSIUM CHLORIDE 20 MEQ/1
40 TABLET, EXTENDED RELEASE ORAL PRN
Status: DISCONTINUED | OUTPATIENT
Start: 2023-11-27 | End: 2023-11-28 | Stop reason: HOSPADM

## 2023-11-27 RX ORDER — ENOXAPARIN SODIUM 100 MG/ML
40 INJECTION SUBCUTANEOUS DAILY
Status: DISCONTINUED | OUTPATIENT
Start: 2023-11-27 | End: 2023-11-28 | Stop reason: HOSPADM

## 2023-11-27 RX ORDER — PROPOFOL 10 MG/ML
INJECTION, EMULSION INTRAVENOUS PRN
Status: DISCONTINUED | OUTPATIENT
Start: 2023-11-27 | End: 2023-11-27 | Stop reason: SDUPTHER

## 2023-11-27 RX ORDER — METOCLOPRAMIDE HYDROCHLORIDE 5 MG/ML
10 INJECTION INTRAMUSCULAR; INTRAVENOUS
Status: DISCONTINUED | OUTPATIENT
Start: 2023-11-27 | End: 2023-11-27

## 2023-11-27 RX ORDER — GLYCOPYRROLATE 1 MG/5 ML
SYRINGE (ML) INTRAVENOUS PRN
Status: DISCONTINUED | OUTPATIENT
Start: 2023-11-27 | End: 2023-11-27 | Stop reason: SDUPTHER

## 2023-11-27 RX ORDER — SODIUM CHLORIDE, SODIUM LACTATE, POTASSIUM CHLORIDE, CALCIUM CHLORIDE 600; 310; 30; 20 MG/100ML; MG/100ML; MG/100ML; MG/100ML
INJECTION, SOLUTION INTRAVENOUS CONTINUOUS PRN
Status: DISCONTINUED | OUTPATIENT
Start: 2023-11-27 | End: 2023-11-27 | Stop reason: SDUPTHER

## 2023-11-27 RX ORDER — OXYCODONE HYDROCHLORIDE AND ACETAMINOPHEN 5; 325 MG/1; MG/1
1 TABLET ORAL EVERY 4 HOURS PRN
Status: DISCONTINUED | OUTPATIENT
Start: 2023-11-27 | End: 2023-11-27

## 2023-11-27 RX ORDER — MIDAZOLAM HYDROCHLORIDE 1 MG/ML
INJECTION INTRAMUSCULAR; INTRAVENOUS PRN
Status: DISCONTINUED | OUTPATIENT
Start: 2023-11-27 | End: 2023-11-27 | Stop reason: SDUPTHER

## 2023-11-27 RX ORDER — MAGNESIUM SULFATE IN WATER 40 MG/ML
2000 INJECTION, SOLUTION INTRAVENOUS PRN
Status: DISCONTINUED | OUTPATIENT
Start: 2023-11-27 | End: 2023-11-28 | Stop reason: HOSPADM

## 2023-11-27 RX ORDER — HYDROCODONE BITARTRATE AND ACETAMINOPHEN 5; 325 MG/1; MG/1
2 TABLET ORAL EVERY 6 HOURS
Status: DISCONTINUED | OUTPATIENT
Start: 2023-11-27 | End: 2023-11-28

## 2023-11-27 RX ORDER — POTASSIUM CHLORIDE 7.45 MG/ML
10 INJECTION INTRAVENOUS PRN
Status: DISCONTINUED | OUTPATIENT
Start: 2023-11-27 | End: 2023-11-28 | Stop reason: HOSPADM

## 2023-11-27 RX ORDER — SODIUM CHLORIDE 9 MG/ML
INJECTION, SOLUTION INTRAVENOUS CONTINUOUS
Status: DISCONTINUED | OUTPATIENT
Start: 2023-11-27 | End: 2023-11-28 | Stop reason: HOSPADM

## 2023-11-27 RX ORDER — SODIUM CHLORIDE 9 MG/ML
INJECTION, SOLUTION INTRAVENOUS PRN
Status: DISCONTINUED | OUTPATIENT
Start: 2023-11-27 | End: 2023-11-28 | Stop reason: HOSPADM

## 2023-11-27 RX ORDER — DIPHENHYDRAMINE HYDROCHLORIDE 50 MG/ML
12.5 INJECTION INTRAMUSCULAR; INTRAVENOUS
Status: DISCONTINUED | OUTPATIENT
Start: 2023-11-27 | End: 2023-11-27

## 2023-11-27 RX ORDER — MIDAZOLAM HYDROCHLORIDE 2 MG/2ML
2 INJECTION, SOLUTION INTRAMUSCULAR; INTRAVENOUS
Status: DISCONTINUED | OUTPATIENT
Start: 2023-11-27 | End: 2023-11-27

## 2023-11-27 RX ORDER — SODIUM CHLORIDE 0.9 % (FLUSH) 0.9 %
5-40 SYRINGE (ML) INJECTION PRN
Status: DISCONTINUED | OUTPATIENT
Start: 2023-11-27 | End: 2023-11-28 | Stop reason: HOSPADM

## 2023-11-27 RX ORDER — POLYETHYLENE GLYCOL 3350 17 G/17G
17 POWDER, FOR SOLUTION ORAL DAILY
Status: DISCONTINUED | OUTPATIENT
Start: 2023-11-27 | End: 2023-11-28 | Stop reason: HOSPADM

## 2023-11-27 RX ORDER — HYDROCODONE BITARTRATE AND ACETAMINOPHEN 5; 325 MG/1; MG/1
2 TABLET ORAL EVERY 6 HOURS PRN
Status: DISCONTINUED | OUTPATIENT
Start: 2023-11-27 | End: 2023-11-27

## 2023-11-27 RX ADMIN — FENTANYL CITRATE 50 MCG: 50 INJECTION, SOLUTION INTRAMUSCULAR; INTRAVENOUS at 16:33

## 2023-11-27 RX ADMIN — SODIUM CHLORIDE: 9 INJECTION, SOLUTION INTRAVENOUS at 03:09

## 2023-11-27 RX ADMIN — Medication 0.2 MG: at 16:02

## 2023-11-27 RX ADMIN — PROPOFOL 50 MG: 10 INJECTION, EMULSION INTRAVENOUS at 16:18

## 2023-11-27 RX ADMIN — SODIUM CHLORIDE, POTASSIUM CHLORIDE, SODIUM LACTATE AND CALCIUM CHLORIDE: 600; 310; 30; 20 INJECTION, SOLUTION INTRAVENOUS at 15:32

## 2023-11-27 RX ADMIN — FENTANYL CITRATE 50 MCG: 50 INJECTION, SOLUTION INTRAMUSCULAR; INTRAVENOUS at 16:03

## 2023-11-27 RX ADMIN — DEXMEDETOMIDINE HCL 4 MCG: 100 INJECTION INTRAVENOUS at 16:08

## 2023-11-27 RX ADMIN — PROPOFOL 25 MG: 10 INJECTION, EMULSION INTRAVENOUS at 16:21

## 2023-11-27 RX ADMIN — HYDROMORPHONE HYDROCHLORIDE 0.5 MG: 1 INJECTION, SOLUTION INTRAMUSCULAR; INTRAVENOUS; SUBCUTANEOUS at 19:02

## 2023-11-27 RX ADMIN — HYDROCODONE BITARTRATE AND ACETAMINOPHEN 2 TABLET: 5; 325 TABLET ORAL at 22:14

## 2023-11-27 RX ADMIN — OXYCODONE AND ACETAMINOPHEN 1 TABLET: 5; 325 TABLET ORAL at 10:22

## 2023-11-27 RX ADMIN — SODIUM CHLORIDE, POTASSIUM CHLORIDE, SODIUM LACTATE AND CALCIUM CHLORIDE: 600; 310; 30; 20 INJECTION, SOLUTION INTRAVENOUS at 16:05

## 2023-11-27 RX ADMIN — PROPOFOL 50 MG: 10 INJECTION, EMULSION INTRAVENOUS at 16:25

## 2023-11-27 RX ADMIN — OXYCODONE AND ACETAMINOPHEN 1 TABLET: 5; 325 TABLET ORAL at 03:05

## 2023-11-27 RX ADMIN — DEXAMETHASONE SODIUM PHOSPHATE 8 MG: 10 INJECTION, SOLUTION INTRAMUSCULAR; INTRAVENOUS at 16:17

## 2023-11-27 RX ADMIN — MIDAZOLAM 2 MG: 1 INJECTION INTRAMUSCULAR; INTRAVENOUS at 16:03

## 2023-11-27 RX ADMIN — SODIUM CHLORIDE: 9 INJECTION, SOLUTION INTRAVENOUS at 17:52

## 2023-11-27 RX ADMIN — PROPOFOL 50 MG: 10 INJECTION, EMULSION INTRAVENOUS at 16:14

## 2023-11-27 RX ADMIN — LEVOFLOXACIN 750 MG: 5 INJECTION, SOLUTION INTRAVENOUS at 00:38

## 2023-11-27 RX ADMIN — PROPOFOL 50 MG: 10 INJECTION, EMULSION INTRAVENOUS at 16:12

## 2023-11-27 RX ADMIN — PROPOFOL 25 MG: 10 INJECTION, EMULSION INTRAVENOUS at 16:16

## 2023-11-27 RX ADMIN — LIDOCAINE HYDROCHLORIDE 40 MG: 10 INJECTION, SOLUTION INFILTRATION; PERINEURAL at 16:09

## 2023-11-27 RX ADMIN — ONDANSETRON 4 MG: 2 INJECTION INTRAMUSCULAR; INTRAVENOUS at 16:27

## 2023-11-27 RX ADMIN — DEXMEDETOMIDINE HCL 2 MCG: 100 INJECTION INTRAVENOUS at 16:13

## 2023-11-27 ASSESSMENT — PAIN SCALES - GENERAL
PAINLEVEL_OUTOF10: 5
PAINLEVEL_OUTOF10: 6
PAINLEVEL_OUTOF10: 7
PAINLEVEL_OUTOF10: 7
PAINLEVEL_OUTOF10: 4

## 2023-11-27 ASSESSMENT — PAIN DESCRIPTION - DESCRIPTORS
DESCRIPTORS: ACHING
DESCRIPTORS: DULL;ACHING
DESCRIPTORS: ACHING
DESCRIPTORS: JABBING
DESCRIPTORS: ACHING

## 2023-11-27 ASSESSMENT — ENCOUNTER SYMPTOMS
COUGH: 0
SHORTNESS OF BREATH: 0
SORE THROAT: 0
NAUSEA: 1
ABDOMINAL PAIN: 1
WHEEZING: 0
VOMITING: 0
BACK PAIN: 1

## 2023-11-27 ASSESSMENT — PAIN DESCRIPTION - PAIN TYPE
TYPE: ACUTE PAIN

## 2023-11-27 ASSESSMENT — PAIN - FUNCTIONAL ASSESSMENT: PAIN_FUNCTIONAL_ASSESSMENT: 0-10

## 2023-11-27 ASSESSMENT — PAIN DESCRIPTION - LOCATION
LOCATION: ABDOMEN;BACK
LOCATION: FLANK;ABDOMEN
LOCATION: ABDOMEN;FLANK
LOCATION: FLANK;ABDOMEN
LOCATION: FLANK;ABDOMEN
LOCATION: ABDOMEN;FLANK

## 2023-11-27 ASSESSMENT — LIFESTYLE VARIABLES: SMOKING_STATUS: 1

## 2023-11-27 ASSESSMENT — PAIN DESCRIPTION - ORIENTATION
ORIENTATION: LEFT
ORIENTATION: LEFT;MID;LOWER

## 2023-11-27 NOTE — DISCHARGE INSTRUCTIONS
Date of admission: 11/26/2023  Date of discharge: 11/27/23    Your care was provided by the attending and resident physicians of the [address] [place] [address] Residency Program. The primary encounter diagnosis was Calculus of kidney with calculus of ureter. A diagnosis of Acute cystitis with hematuria was also pertinent to this visit. FOLLOW-UP  - Follow up with your primary care physician Lainey Louis in 2 Days.  - Follow up with the Urology. Office will call you to arrange outpatient surgery to remove the stent and further management of the stone if needed. If you do not receive a call, call back using the above information in 1 week. MEDICATIONS  -  your Levaquin and [place] from the pharmacy and take as directed. - Continue taking your other home medications as directed. ADDITIONAL INSTRUCTIONS  -See attached education on care at home after having a ureteral stent placed  Please return to the [facility] Emergency Department if your symptoms worsen, if you experience chest pain, shortness of breath more than usual, you pass out, developed fevers or confusion, or any concerns whatsoever.

## 2023-11-27 NOTE — ANESTHESIA POSTPROCEDURE EVALUATION
Department of Anesthesiology  Postprocedure Note    Patient: Néstor Mauricio  MRN: [id number]  YOB: 1990  Date of evaluation: 11/27/2023      Procedure Summary     Date: 11/27/23 Room / Location: [address]    Anesthesia Start: 0620 Anesthesia Stop: 0281    Procedure: CYSTOSCOPY LEFT URETERAL STENT INSERTION (Left) Diagnosis:       Kidney stone on left side      (Kidney stone on left side [N20.0])    Surgeons: Kamilah Richmond MD Responsible Provider: Tosha Mitchell MD    Anesthesia Type: MAC ASA Status: 2          Anesthesia Type: No value filed.     Roro Phase I: Roro Score: 10    Roro Phase II:        Anesthesia Post Evaluation    Patient location during evaluation: PACU  Patient participation: complete - patient participated  Level of consciousness: awake  Airway patency: patent  Nausea & Vomiting: no nausea and no vomiting  Complications: no  Cardiovascular status: blood pressure returned to baseline  Respiratory status: acceptable  Hydration status: euvolemic  Pain management: adequate

## 2023-11-27 NOTE — H&P
cephalosporins  -Was given 1 L NS in ER, will initiate MIVF  -Follow urine culture  -Follow blood cultures  -Lactic acid pending  -Pain control with Toradol and Percocet with preference to Percocet due to AUDELIA  Acute kidney injury  -Secondary to obstructing UVJ  -Baseline creatinine 0.73-0.90, on admission creatinine 1.2  -Patient received 1 L NS bolus  -Initiated MIVF  Fibromyalgia  Anxiety  Depression  PTSD  -Continue home Pristiq, and hydroxyzine  Juvenile rheumatoid arthritis  -Continue home Voltaren gel  History of rheumatic fever  -Will continue monitoring for any worsening symptoms  -Murmur present on physical exam    Patient is admitted as inpatient status because of co-morbidities listed above, severity of signs and symptoms as outlined, requirement for current medical therapies and most importantly because of direct risk to patient if care not provided in a hospital setting. Expected length of stay > 48 hours.  Patient is admitted in the Med/Surge    Telemetry: Remote telemetry  Anticoagulation: Lovenox  Dispo: Home    Patient was seen, evaluated and discussed with MD Juan Chand MD  Family Medicine Resident, PGY-2   11/27/2023  5:13 AM    Copy sent to Dr. Starla Childers, APRN - CNP

## 2023-11-27 NOTE — OP NOTE
upper collecting system using fluoroscopy. We then removed our wire. There was good proximal and distal curl. We did not leave the string at the end of the stent. We then drained the patient's bladder and removed the scope and the procedure was subsequently terminated. Plan:   Patient should be observed overnight; if stable then can be discharged tomorrow on 10-14 days of Abx. The patient will need to follow up for definitive stone management.             Electronically signed by Sammie Puente MD on 11/27/2023 at 4:30 PM

## 2023-11-27 NOTE — CONSULTS
Urology Consultation      Patient:  Patrice Hernandes  MRN: [id number]  YOB: 1990    CHIEF COMPLAINT:  left ureteral stone    HISTORY OF PRESENT ILLNESS:   The patient is a 28 y.o. female who presents with left flank pain, fever  Left ureteral stone seen on CT  6-7 mm  Tachycardia  Never had stones before  On levaquin    Patient's old records, notes and chart reviewed and summarized above.     Past Medical History:    Past Medical History:   Diagnosis Date    Chronic sinus infection     Cluster headache     History of sepsis 09/2016    post C-Birth/partial    HSP ([facility] purpura) (HCC)     Juvenile rheumatoid arthritis ([address]) 1991    Dr. Tio Alvarez traumatic stress disorder (PTSD)     Uveitis 1991       Past Surgical History:    Past Surgical History:   Procedure Laterality Date    BACK INJECTION Bilateral 3/5/2021    SACROILIAC JOINT INJECTION performed by Isma Peter MD at [address] [place] [address]  09/2016    CHOLECYSTECTOMY      EYE SURGERY Left 2009    cataract with lens implant    HYSTERECTOMY ([facility])  09/2016    partial    KNEE ARTHROSCOPY Right     X's 3 for meniscal tears    NERVE BLOCK Bilateral 03/05/2021     SACROILIAC JOINT INJECTION (Bilateral )    PAIN MANAGEMENT PROCEDURE Bilateral 02/12/2021    NERVE BLOCK BILATERAL -MBB #1 L4-5, L5-S1 - Bilateral    PAIN MANAGEMENT PROCEDURE Bilateral 2/12/2021    NERVE BLOCK BILATERAL -MBB #1 L4-5, L5-S1 performed by Isma Peter MD at [address] [place] [address]  03/05/2021    SACROILIAC JOINT INJECTION - Bilateral    PARTIAL HYSTERECTOMY (CERVIX NOT REMOVED)      MO ERCP DX COLLECTION SPECIMEN BRUSHING/WASHING N/A 3/14/2018    ERCP ENDOSCOPIC RETROGRADE CHOLANGIOPANCREATOGRAPHY WITH BALLOON SWEEP performed by Jenna Rodriguez MD at [address] [place] [address] 3/16/2018    LAPAROSCOPIC [address] performed by

## 2023-11-27 NOTE — PLAN OF CARE
Problem: Discharge Planning  Goal: Discharge to home or other facility with appropriate resources  Outcome: Progressing  Flowsheets (Taken 11/27/2023 0204)  Discharge to home or other facility with appropriate resources: Refer to discharge planning if patient needs post-hospital services based on physician order or complex needs related to functional status, cognitive ability or social support system     Problem: Pain  Goal: Verbalizes/displays adequate comfort level or baseline comfort level  Outcome: Progressing     Problem: ABCDS Injury Assessment  Goal: Absence of physical injury  Outcome: Progressing

## 2023-11-27 NOTE — ED PROVIDER NOTES
EMERGENCY DEPARTMENT ENCOUNTER      Pt Name: Makeda Cm  MRN: [id number]  [address] [facility] 1990  Date of evaluation: 11/26/2023  Provider: Carol Raymond PA-C    CHIEF COMPLAINT       Chief Complaint   Patient presents with    Flank Pain     Left side    Nausea    Emesis    Fatigue    Tachycardia         HISTORY OF PRESENT ILLNESS      Makeda Cm is a 28 y.o. female who presents to the emergency department with her  with the complaints of bilateral flank pain worse on the left than right. States that this started yesterday. She denies any history of stones, but she states that the pain radiates to her groin. She denies any blood in her vomit, she denies any diarrhea or constipation. States she cannot get comfortable with this pain.   Patient states that she was recently treated about 3 weeks ago for UTI which resolved        REVIEW OF SYSTEMS       Review of Systems   AS STATED IN HPI      PAST MEDICAL HISTORY     Past Medical History:   Diagnosis Date    Chronic sinus infection     Cluster headache     History of sepsis 09/2016    post C-Birth/partial    HSP ([facility] purpura) (HCC)     Juvenile rheumatoid arthritis ([address]) 1991    Dr. Haile Genao traumatic stress disorder (PTSD)     Uveitis 1991         SURGICAL HISTORY       Past Surgical History:   Procedure Laterality Date    BACK INJECTION Bilateral 3/5/2021    SACROILIAC JOINT INJECTION performed by Eber Jones MD at [address]  09/2016    CHOLECYSTECTOMY      EYE SURGERY Left 2009    cataract with lens implant    HYSTERECTOMY ([facility])  09/2016    partial    KNEE ARTHROSCOPY Right     X's 3 for meniscal tears    NERVE BLOCK Bilateral 03/05/2021     SACROILIAC JOINT INJECTION (Bilateral )    PAIN MANAGEMENT PROCEDURE Bilateral 02/12/2021    NERVE BLOCK BILATERAL -MBB #1 L4-5, L5-S1 - Bilateral    PAIN MANAGEMENT PROCEDURE Bilateral 2/12/2021    NERVE BLOCK BILATERAL -MBB

## 2023-11-28 VITALS
HEIGHT: 62 IN | RESPIRATION RATE: 16 BRPM | BODY MASS INDEX: 35.3 KG/M2 | OXYGEN SATURATION: 97 % | HEART RATE: 79 BPM | TEMPERATURE: 97.3 F | SYSTOLIC BLOOD PRESSURE: 121 MMHG | WEIGHT: 191.8 LBS | DIASTOLIC BLOOD PRESSURE: 74 MMHG

## 2023-11-28 LAB
ANION GAP SERPL CALCULATED.3IONS-SCNC: 10 MMOL/L (ref 9–17)
BASOPHILS # BLD: 0 K/UL (ref 0–0.2)
BASOPHILS NFR BLD: 0 % (ref 0–2)
BUN SERPL-MCNC: 14 MG/DL (ref 6–20)
CALCIUM SERPL-MCNC: 9 MG/DL (ref 8.6–10.4)
CHLORIDE SERPL-SCNC: 107 MMOL/L (ref 98–107)
CO2 SERPL-SCNC: 23 MMOL/L (ref 20–31)
CREAT SERPL-MCNC: 1.2 MG/DL (ref 0.5–0.9)
EOSINOPHIL # BLD: 0 K/UL (ref 0–0.4)
EOSINOPHILS RELATIVE PERCENT: 0 % (ref 1–4)
ERYTHROCYTE [DISTWIDTH] IN BLOOD BY AUTOMATED COUNT: 13 % (ref 12.5–15.4)
GFR SERPL CREATININE-BSD FRML MDRD: >60 ML/MIN/1.73M2
GLUCOSE SERPL-MCNC: 140 MG/DL (ref 70–99)
HCT VFR BLD AUTO: 32.7 % (ref 36–46)
HGB BLD-MCNC: 11.1 G/DL (ref 12–16)
LYMPHOCYTES NFR BLD: 0.6 K/UL (ref 1–4.8)
LYMPHOCYTES RELATIVE PERCENT: 9 % (ref 24–44)
MCH RBC QN AUTO: 27.6 PG (ref 26–34)
MCHC RBC AUTO-ENTMCNC: 33.8 G/DL (ref 31–37)
MCV RBC AUTO: 81.5 FL (ref 80–100)
MICROORGANISM SPEC CULT: NORMAL
MONOCYTES NFR BLD: 0.1 K/UL (ref 0.1–1.2)
MONOCYTES NFR BLD: 2 % (ref 2–11)
NEUTROPHILS NFR BLD: 89 % (ref 36–66)
NEUTS SEG NFR BLD: 6.4 K/UL (ref 1.8–7.7)
PLATELET # BLD AUTO: 253 K/UL (ref 140–450)
PMV BLD AUTO: 7.1 FL (ref 6–12)
POTASSIUM SERPL-SCNC: 4.5 MMOL/L (ref 3.7–5.3)
RBC # BLD AUTO: 4.01 M/UL (ref 4–5.2)
SODIUM SERPL-SCNC: 140 MMOL/L (ref 135–144)
SPECIMEN DESCRIPTION: NORMAL
WBC OTHER # BLD: 7.2 K/UL (ref 3.5–11)

## 2023-11-28 PROCEDURE — 99238 HOSP IP/OBS DSCHRG MGMT 30/<: CPT | Performed by: FAMILY MEDICINE

## 2023-11-28 PROCEDURE — 85025 COMPLETE CBC W/AUTO DIFF WBC: CPT

## 2023-11-28 PROCEDURE — 6370000000 HC RX 637 (ALT 250 FOR IP)

## 2023-11-28 PROCEDURE — 6370000000 HC RX 637 (ALT 250 FOR IP): Performed by: UROLOGY

## 2023-11-28 PROCEDURE — 80048 BASIC METABOLIC PNL TOTAL CA: CPT

## 2023-11-28 PROCEDURE — 36415 COLL VENOUS BLD VENIPUNCTURE: CPT

## 2023-11-28 RX ORDER — HYDROCODONE BITARTRATE AND ACETAMINOPHEN 5; 325 MG/1; MG/1
1 TABLET ORAL EVERY 6 HOURS
Qty: 12 TABLET | Refills: 0 | Status: SHIPPED | OUTPATIENT
Start: 2023-11-28 | End: 2023-12-01

## 2023-11-28 RX ORDER — HYDROCODONE BITARTRATE AND ACETAMINOPHEN 5; 325 MG/1; MG/1
1 TABLET ORAL EVERY 6 HOURS
Status: DISCONTINUED | OUTPATIENT
Start: 2023-11-28 | End: 2023-11-28 | Stop reason: HOSPADM

## 2023-11-28 RX ORDER — LEVOFLOXACIN 750 MG/1
750 TABLET, FILM COATED ORAL DAILY
Qty: 10 TABLET | Refills: 0 | Status: SHIPPED | OUTPATIENT
Start: 2023-11-28 | End: 2023-12-08

## 2023-11-28 RX ADMIN — HYDROCODONE BITARTRATE AND ACETAMINOPHEN 2 TABLET: 5; 325 TABLET ORAL at 04:16

## 2023-11-28 RX ADMIN — VENLAFAXINE HYDROCHLORIDE 37.5 MG: 37.5 CAPSULE, EXTENDED RELEASE ORAL at 08:42

## 2023-11-28 RX ADMIN — HYDROCODONE BITARTRATE AND ACETAMINOPHEN 2 TABLET: 5; 325 TABLET ORAL at 09:34

## 2023-11-28 RX ADMIN — LEVOFLOXACIN 750 MG: 750 TABLET, FILM COATED ORAL at 08:42

## 2023-11-28 RX ADMIN — VITAM B12 50 MCG: 100 TAB at 08:42

## 2023-11-28 ASSESSMENT — PAIN SCALES - GENERAL: PAINLEVEL_OUTOF10: 4

## 2023-11-28 NOTE — PROGRESS NOTES
Pt discharged via wheelchair with all belongings, scripts and discharge paperwork. Follow up appointments and discharge instructions reviewed with pt and . All question answered to satisfaction.

## 2023-11-28 NOTE — PROGRESS NOTES
Physician Progress Note      PATIENT:               Manjinder Baer  CSN #:                  [id number]  :                       1990  ADMIT DATE:       2023 8:02 PM  [address] DATE:        2023 11:12 AM  RESPONDING  PROVIDER #:        Julianna Lopez MD          QUERY TEXT:    Patient admitted with Left ujv obstruction, noted to have Acute cystitis   without hematuria. UA on admission positive nitrite and small leukocyte. urine   culture on  shows no significant growth. If possible, please document   in progress notes and discharge summary if you are evaluating and/or treating   any of the following: The medical record reflects the following:  Risk Factors: upj stone  Clinical Indicators: UA on admission positive nitrite and small leukocyte. urine culture on  shows no significant growth  Treatment: urine culture Levaquin po    Thank Chris Hughes RN BSN CCDS  Email [email or web address]  Cell [phone]  office hours M-F 6am to 2:30p  Options provided:  -- Acute cystitis despite negative culture  -- Acute cystitis ruled out after study  -- Other - I will add my own diagnosis  -- Disagree - Not applicable / Not valid  -- Disagree - Clinically unable to determine / Unknown  -- Refer to Clinical Documentation Reviewer    PROVIDER RESPONSE TEXT:    this patient has Acute cystitis despite negative culture    Query created by: Chelsea Maloney on 2023 12:15 PM      Electronically signed by:   Julianna Lopez MD 2023 12:40 PM

## 2023-12-02 LAB
MICROORGANISM SPEC CULT: NORMAL
MICROORGANISM SPEC CULT: NORMAL
SERVICE CMNT-IMP: NORMAL
SERVICE CMNT-IMP: NORMAL
SPECIMEN DESCRIPTION: NORMAL
SPECIMEN DESCRIPTION: NORMAL

## 2024-01-12 RX ORDER — IBUPROFEN 200 MG
200 TABLET ORAL EVERY 6 HOURS PRN
COMMUNITY

## 2024-01-16 ENCOUNTER — ANESTHESIA EVENT (OUTPATIENT)
Dept: OPERATING ROOM | Age: 34
End: 2024-01-16
Payer: COMMERCIAL

## 2024-01-16 ENCOUNTER — HOSPITAL ENCOUNTER (OUTPATIENT)
Age: 34
Setting detail: OUTPATIENT SURGERY
Discharge: HOME | End: 2024-01-16
Attending: UROLOGY | Admitting: UROLOGY
Payer: COMMERCIAL

## 2024-01-16 ENCOUNTER — ANESTHESIA (OUTPATIENT)
Dept: OPERATING ROOM | Age: 34
End: 2024-01-16
Payer: COMMERCIAL

## 2024-01-16 ENCOUNTER — APPOINTMENT (OUTPATIENT)
Dept: GENERAL RADIOLOGY | Age: 34
End: 2024-01-16
Attending: UROLOGY
Payer: COMMERCIAL

## 2024-01-16 VITALS
SYSTOLIC BLOOD PRESSURE: 118 MMHG | TEMPERATURE: 96.5 F | OXYGEN SATURATION: 100 % | DIASTOLIC BLOOD PRESSURE: 89 MMHG | HEART RATE: 76 BPM | WEIGHT: 185 LBS | BODY MASS INDEX: 34.04 KG/M2 | RESPIRATION RATE: 12 BRPM | HEIGHT: 62 IN

## 2024-01-16 DIAGNOSIS — G89.18 POST-OP PAIN: Primary | ICD-10-CM

## 2024-01-16 LAB
BUN BLD-MCNC: 12 MG/DL (ref 8–26)
CA-I BLD-SCNC: 1.31 MMOL/L (ref 1.15–1.33)
CHLORIDE BLD-SCNC: 113 MMOL/L (ref 98–107)
CO2 BLD CALC-SCNC: 25 MMOL/L (ref 22–30)
EGFR, POC: >60 ML/MIN/1.73M2
GLUCOSE BLD-MCNC: 88 MG/DL (ref 74–100)
HCT VFR BLD AUTO: 44 % (ref 36–46)
POC ANION GAP: 9 MMOL/L (ref 7–16)
POC CREATININE: 1 MG/DL (ref 0.51–1.19)
POC HEMOGLOBIN (CALC): 14.8 G/DL (ref 12–16)
POC LACTIC ACID: 1.1 MMOL/L (ref 0.56–1.39)
POTASSIUM BLD-SCNC: 4 MMOL/L (ref 3.5–4.5)
SODIUM BLD-SCNC: 146 MMOL/L (ref 138–146)

## 2024-01-16 PROCEDURE — 2720000010 HC SURG SUPPLY STERILE: Performed by: UROLOGY

## 2024-01-16 PROCEDURE — 6360000004 HC RX CONTRAST MEDICATION: Performed by: UROLOGY

## 2024-01-16 PROCEDURE — 7100000010 HC PHASE II RECOVERY - FIRST 15 MIN: Performed by: UROLOGY

## 2024-01-16 PROCEDURE — 7100000001 HC PACU RECOVERY - ADDTL 15 MIN: Performed by: UROLOGY

## 2024-01-16 PROCEDURE — 7100000000 HC PACU RECOVERY - FIRST 15 MIN: Performed by: UROLOGY

## 2024-01-16 PROCEDURE — 2580000003 HC RX 258: Performed by: NURSE ANESTHETIST, CERTIFIED REGISTERED

## 2024-01-16 PROCEDURE — 2580000003 HC RX 258: Performed by: UROLOGY

## 2024-01-16 PROCEDURE — 3600000004 HC SURGERY LEVEL 4 BASE: Performed by: UROLOGY

## 2024-01-16 PROCEDURE — 6360000002 HC RX W HCPCS: Performed by: NURSE ANESTHETIST, CERTIFIED REGISTERED

## 2024-01-16 PROCEDURE — 84520 ASSAY OF UREA NITROGEN: CPT

## 2024-01-16 PROCEDURE — C1758 CATHETER, URETERAL: HCPCS | Performed by: UROLOGY

## 2024-01-16 PROCEDURE — C1769 GUIDE WIRE: HCPCS | Performed by: UROLOGY

## 2024-01-16 PROCEDURE — 2709999900 HC NON-CHARGEABLE SUPPLY: Performed by: UROLOGY

## 2024-01-16 PROCEDURE — 3700000000 HC ANESTHESIA ATTENDED CARE: Performed by: UROLOGY

## 2024-01-16 PROCEDURE — 6360000002 HC RX W HCPCS: Performed by: PHYSICIAN ASSISTANT

## 2024-01-16 PROCEDURE — 82947 ASSAY GLUCOSE BLOOD QUANT: CPT

## 2024-01-16 PROCEDURE — 3600000014 HC SURGERY LEVEL 4 ADDTL 15MIN: Performed by: UROLOGY

## 2024-01-16 PROCEDURE — C2617 STENT, NON-COR, TEM W/O DEL: HCPCS | Performed by: UROLOGY

## 2024-01-16 PROCEDURE — 85014 HEMATOCRIT: CPT

## 2024-01-16 PROCEDURE — 2500000003 HC RX 250 WO HCPCS: Performed by: NURSE ANESTHETIST, CERTIFIED REGISTERED

## 2024-01-16 PROCEDURE — 3700000001 HC ADD 15 MINUTES (ANESTHESIA): Performed by: UROLOGY

## 2024-01-16 PROCEDURE — 80051 ELECTROLYTE PANEL: CPT

## 2024-01-16 PROCEDURE — C1894 INTRO/SHEATH, NON-LASER: HCPCS | Performed by: UROLOGY

## 2024-01-16 PROCEDURE — 82330 ASSAY OF CALCIUM: CPT

## 2024-01-16 PROCEDURE — 83605 ASSAY OF LACTIC ACID: CPT

## 2024-01-16 PROCEDURE — 82565 ASSAY OF CREATININE: CPT

## 2024-01-16 PROCEDURE — 7100000011 HC PHASE II RECOVERY - ADDTL 15 MIN: Performed by: UROLOGY

## 2024-01-16 DEVICE — URETERAL STENT
Type: IMPLANTABLE DEVICE | Status: FUNCTIONAL
Brand: POLARIS™ ULTRA

## 2024-01-16 RX ORDER — PROCHLORPERAZINE EDISYLATE 5 MG/ML
5 INJECTION INTRAMUSCULAR; INTRAVENOUS
Status: DISCONTINUED | OUTPATIENT
Start: 2024-01-16 | End: 2024-01-16 | Stop reason: HOSPADM

## 2024-01-16 RX ORDER — FENTANYL CITRATE 50 UG/ML
INJECTION, SOLUTION INTRAMUSCULAR; INTRAVENOUS PRN
Status: DISCONTINUED | OUTPATIENT
Start: 2024-01-16 | End: 2024-01-16 | Stop reason: SDUPTHER

## 2024-01-16 RX ORDER — ONDANSETRON 2 MG/ML
INJECTION INTRAMUSCULAR; INTRAVENOUS PRN
Status: DISCONTINUED | OUTPATIENT
Start: 2024-01-16 | End: 2024-01-16 | Stop reason: SDUPTHER

## 2024-01-16 RX ORDER — MAGNESIUM HYDROXIDE 1200 MG/15ML
LIQUID ORAL CONTINUOUS PRN
Status: DISCONTINUED | OUTPATIENT
Start: 2024-01-16 | End: 2024-01-16 | Stop reason: HOSPADM

## 2024-01-16 RX ORDER — PHENAZOPYRIDINE HYDROCHLORIDE 200 MG/1
200 TABLET, FILM COATED ORAL 3 TIMES DAILY PRN
Qty: 6 TABLET | Refills: 0 | Status: SHIPPED | OUTPATIENT
Start: 2024-01-16 | End: 2024-01-19

## 2024-01-16 RX ORDER — SODIUM CHLORIDE, SODIUM LACTATE, POTASSIUM CHLORIDE, CALCIUM CHLORIDE 600; 310; 30; 20 MG/100ML; MG/100ML; MG/100ML; MG/100ML
INJECTION, SOLUTION INTRAVENOUS CONTINUOUS PRN
Status: DISCONTINUED | OUTPATIENT
Start: 2024-01-16 | End: 2024-01-16 | Stop reason: SDUPTHER

## 2024-01-16 RX ORDER — CLINDAMYCIN HYDROCHLORIDE 300 MG/1
300 CAPSULE ORAL 2 TIMES DAILY
Qty: 6 CAPSULE | Refills: 0 | Status: SHIPPED | OUTPATIENT
Start: 2024-01-16 | End: 2024-01-19

## 2024-01-16 RX ORDER — MIDAZOLAM HYDROCHLORIDE 2 MG/2ML
2 INJECTION, SOLUTION INTRAMUSCULAR; INTRAVENOUS
Status: DISCONTINUED | OUTPATIENT
Start: 2024-01-16 | End: 2024-01-16 | Stop reason: HOSPADM

## 2024-01-16 RX ORDER — DROPERIDOL 2.5 MG/ML
0.62 INJECTION, SOLUTION INTRAMUSCULAR; INTRAVENOUS
Status: DISCONTINUED | OUTPATIENT
Start: 2024-01-16 | End: 2024-01-16 | Stop reason: HOSPADM

## 2024-01-16 RX ORDER — LABETALOL HYDROCHLORIDE 5 MG/ML
10 INJECTION, SOLUTION INTRAVENOUS
Status: DISCONTINUED | OUTPATIENT
Start: 2024-01-16 | End: 2024-01-16 | Stop reason: HOSPADM

## 2024-01-16 RX ORDER — CLINDAMYCIN PHOSPHATE 900 MG/50ML
900 INJECTION, SOLUTION INTRAVENOUS
Status: COMPLETED | OUTPATIENT
Start: 2024-01-16 | End: 2024-01-16

## 2024-01-16 RX ORDER — TAMSULOSIN HYDROCHLORIDE 0.4 MG/1
0.4 CAPSULE ORAL DAILY
Qty: 10 CAPSULE | Refills: 3 | Status: SHIPPED | OUTPATIENT
Start: 2024-01-16

## 2024-01-16 RX ORDER — KETOROLAC TROMETHAMINE 10 MG/1
10 TABLET, FILM COATED ORAL EVERY 6 HOURS PRN
Qty: 20 TABLET | Refills: 0 | Status: SHIPPED | OUTPATIENT
Start: 2024-01-16 | End: 2024-01-31

## 2024-01-16 RX ORDER — MIDAZOLAM HYDROCHLORIDE 1 MG/ML
INJECTION INTRAMUSCULAR; INTRAVENOUS PRN
Status: DISCONTINUED | OUTPATIENT
Start: 2024-01-16 | End: 2024-01-16 | Stop reason: SDUPTHER

## 2024-01-16 RX ORDER — SODIUM CHLORIDE 0.9 % (FLUSH) 0.9 %
5-40 SYRINGE (ML) INJECTION EVERY 12 HOURS SCHEDULED
Status: DISCONTINUED | OUTPATIENT
Start: 2024-01-16 | End: 2024-01-16 | Stop reason: HOSPADM

## 2024-01-16 RX ORDER — MAGNESIUM HYDROXIDE 1200 MG/15ML
LIQUID ORAL PRN
Status: DISCONTINUED | OUTPATIENT
Start: 2024-01-16 | End: 2024-01-16 | Stop reason: HOSPADM

## 2024-01-16 RX ORDER — HYDROCODONE BITARTRATE AND ACETAMINOPHEN 5; 325 MG/1; MG/1
1 TABLET ORAL ONCE
Status: DISCONTINUED | OUTPATIENT
Start: 2024-01-16 | End: 2024-01-16 | Stop reason: HOSPADM

## 2024-01-16 RX ORDER — LIDOCAINE HYDROCHLORIDE 10 MG/ML
INJECTION, SOLUTION EPIDURAL; INFILTRATION; INTRACAUDAL; PERINEURAL PRN
Status: DISCONTINUED | OUTPATIENT
Start: 2024-01-16 | End: 2024-01-16 | Stop reason: SDUPTHER

## 2024-01-16 RX ORDER — HYDROCODONE BITARTRATE AND ACETAMINOPHEN 5; 325 MG/1; MG/1
1 TABLET ORAL EVERY 4 HOURS PRN
Qty: 10 TABLET | Refills: 0 | Status: SHIPPED | OUTPATIENT
Start: 2024-01-16 | End: 2024-01-19

## 2024-01-16 RX ORDER — FENTANYL CITRATE 50 UG/ML
25 INJECTION, SOLUTION INTRAMUSCULAR; INTRAVENOUS EVERY 5 MIN PRN
Status: DISCONTINUED | OUTPATIENT
Start: 2024-01-16 | End: 2024-01-16 | Stop reason: HOSPADM

## 2024-01-16 RX ORDER — KETOROLAC TROMETHAMINE 30 MG/ML
INJECTION, SOLUTION INTRAMUSCULAR; INTRAVENOUS PRN
Status: DISCONTINUED | OUTPATIENT
Start: 2024-01-16 | End: 2024-01-16 | Stop reason: SDUPTHER

## 2024-01-16 RX ORDER — HYDRALAZINE HYDROCHLORIDE 20 MG/ML
10 INJECTION INTRAMUSCULAR; INTRAVENOUS
Status: DISCONTINUED | OUTPATIENT
Start: 2024-01-16 | End: 2024-01-16 | Stop reason: HOSPADM

## 2024-01-16 RX ORDER — TRAMADOL HYDROCHLORIDE 50 MG/1
50 TABLET ORAL
Status: DISCONTINUED | OUTPATIENT
Start: 2024-01-16 | End: 2024-01-16 | Stop reason: HOSPADM

## 2024-01-16 RX ORDER — PROPOFOL 10 MG/ML
INJECTION, EMULSION INTRAVENOUS PRN
Status: DISCONTINUED | OUTPATIENT
Start: 2024-01-16 | End: 2024-01-16 | Stop reason: SDUPTHER

## 2024-01-16 RX ORDER — DIPHENHYDRAMINE HYDROCHLORIDE 50 MG/ML
12.5 INJECTION INTRAMUSCULAR; INTRAVENOUS
Status: DISCONTINUED | OUTPATIENT
Start: 2024-01-16 | End: 2024-01-16 | Stop reason: HOSPADM

## 2024-01-16 RX ORDER — DEXAMETHASONE SODIUM PHOSPHATE 10 MG/ML
INJECTION INTRAMUSCULAR; INTRAVENOUS PRN
Status: DISCONTINUED | OUTPATIENT
Start: 2024-01-16 | End: 2024-01-16 | Stop reason: SDUPTHER

## 2024-01-16 RX ORDER — SODIUM CHLORIDE 0.9 % (FLUSH) 0.9 %
5-40 SYRINGE (ML) INJECTION PRN
Status: DISCONTINUED | OUTPATIENT
Start: 2024-01-16 | End: 2024-01-16 | Stop reason: HOSPADM

## 2024-01-16 RX ORDER — SODIUM CHLORIDE 9 MG/ML
INJECTION, SOLUTION INTRAVENOUS PRN
Status: DISCONTINUED | OUTPATIENT
Start: 2024-01-16 | End: 2024-01-16 | Stop reason: HOSPADM

## 2024-01-16 RX ADMIN — SODIUM CHLORIDE, POTASSIUM CHLORIDE, SODIUM LACTATE AND CALCIUM CHLORIDE: 600; 310; 30; 20 INJECTION, SOLUTION INTRAVENOUS at 12:28

## 2024-01-16 RX ADMIN — KETOROLAC TROMETHAMINE 30 MG: 30 INJECTION, SOLUTION INTRAMUSCULAR at 13:04

## 2024-01-16 RX ADMIN — FENTANYL CITRATE 100 MCG: 50 INJECTION, SOLUTION INTRAMUSCULAR; INTRAVENOUS at 12:39

## 2024-01-16 RX ADMIN — CLINDAMYCIN IN 5 PERCENT DEXTROSE 900 MG: 18 INJECTION, SOLUTION INTRAVENOUS at 12:45

## 2024-01-16 RX ADMIN — DEXAMETHASONE SODIUM PHOSPHATE 10 MG: 10 INJECTION INTRAMUSCULAR; INTRAVENOUS at 12:45

## 2024-01-16 RX ADMIN — ONDANSETRON 4 MG: 2 INJECTION INTRAMUSCULAR; INTRAVENOUS at 13:04

## 2024-01-16 RX ADMIN — LIDOCAINE HYDROCHLORIDE 50 MG: 10 INJECTION, SOLUTION EPIDURAL; INFILTRATION; INTRACAUDAL; PERINEURAL at 12:39

## 2024-01-16 RX ADMIN — PROPOFOL 200 MG: 10 INJECTION, EMULSION INTRAVENOUS at 12:39

## 2024-01-16 RX ADMIN — MIDAZOLAM 2 MG: 1 INJECTION INTRAMUSCULAR; INTRAVENOUS at 12:29

## 2024-01-16 ASSESSMENT — PAIN SCALES - GENERAL: PAINLEVEL_OUTOF10: 6

## 2024-01-16 ASSESSMENT — PAIN DESCRIPTION - LOCATION: LOCATION: FLANK

## 2024-01-16 ASSESSMENT — PAIN DESCRIPTION - ORIENTATION: ORIENTATION: LOWER

## 2024-01-16 ASSESSMENT — PAIN - FUNCTIONAL ASSESSMENT: PAIN_FUNCTIONAL_ASSESSMENT: 0-10

## 2024-01-16 ASSESSMENT — PAIN DESCRIPTION - DESCRIPTORS: DESCRIPTORS: ACHING

## 2024-01-16 NOTE — OP NOTE
0.035 Glidewire and the wire was advanced into the kidney with fluoroscopic guidance. A dual lumen ureteral catheter was used to perform a left retrograde pyelogram which did not reveal any filling defects in the left collecting system. Then the dual lumen was used to place a second 0.035 Glidewire into the kidney, also using fluoroscopic guidance. The flexible ureteroscope was assembled, place over the Glidewire, and advanced up the ureter and into the kidney under direct visualization. The second wire remained in place as a safety.     Pan-nephroscopy was completed. The stone was located in the left lower pole and using a 200 micron laser fiber they were fragmented into sub-200 micron size pieces for easy passage. Repeat nephroscopy and ureteroscopy demonstrated no further stone fragments.  In addition, there were no papillary lesions within the kidney, or erythematous patches concerning for malignant disease. With the safety wire in place, the ureteroscope was slowly retracted down the ureter. The entire ureter was surveyed. There was no evidence of stricture, stone disease, ureteral trauma, or papillary lesion.     A 6Fr x 24 cm ureteral stent was back loaded over the wire. Under direct visualization the stent was advanced until it was in proper location. The Glidewire was then removed. A curl could be seen in the right renal pelvis under using fluoroscopic vision, and in the bladder under direct visualization. The patient's bladder was drained. All instrumentation was removed. A string was left on the stent.     The patient was then awakened, extubated, and discharged back to the PACU in good and stable condition.    Dr. White was present for all critical portions of the surgery.     Disposition: Patient is discharged in stable condition from the PACU with oral antibiotics, oral pain medications, flomax, and levsin. Patient is instructed to remove stent using attached strings in 3 days.    Justin Roldan,

## 2024-01-16 NOTE — ANESTHESIA PRE PROCEDURE
Department of Anesthesiology  Preprocedure Note       Name:  Lindsey Templeton   Age:  33 y.o.  :  1990                                          MRN:  [id number]         Date:  2024      Surgeon: Surgeon(s):  Darshan White MD    Procedure: Procedure(s):  HOLMIUM CYSTOSCOPY, URETEROSCOPY STENT EXCHANGE    Medications prior to admission:   Prior to Admission medications    Medication Sig Start Date End Date Taking? Authorizing Provider   ibuprofen (ADVIL;MOTRIN) 200 MG tablet Take 1 tablet by mouth every 6 hours as needed for Pain Takes 600-800 mg as needed   Yes ProviderPat MD   tamsulosin (FLOMAX) 0.4 MG capsule Take 1 capsule by mouth daily 23   She Enrique APRN - CNP   hydrOXYzine HCl (ATARAX) 25 MG tablet Take 1 tablet by mouth every 8 hours as needed for Itching  Patient taking differently: Take 1 tablet by mouth every 8 hours as needed for Anxiety 4/4/23 4/3/24  Nadira Torres APRN - CNP   desvenlafaxine succinate (PRISTIQ) 25 MG TB24 extended release tablet Take 1 tablet by mouth daily 23   Nadira Torres APRN - CNP   losartan (COZAAR) 25 MG tablet Take 1 tablet by mouth daily  Patient taking differently: Take 1 tablet by mouth daily Hold 24 hours prior to sx 3/6/23 4/5/23  Nadira Torres APRN - CNP   diclofenac sodium (VOLTAREN) 1 % GEL APPLY 2 GRAMS TOPICALLY 4 TIMES DAILY AS NEEDED FOR PAIN 22   Nadira Torres APRN - CNP   Multiple Vitamin (MULTI-VITAMIN DAILY PO) Take by mouth    Pat Guo MD   vitamin B-12 (CYANOCOBALAMIN) 100 MCG tablet Take 0.5 tablets by mouth daily    ProviderPat MD   albuterol sulfate HFA (PROAIR HFA) 108 (90 Base) MCG/ACT inhaler Inhale 2 puffs into the lungs every 6 hours as needed for Wheezing  Patient taking differently: Inhale 2 puffs into the lungs every 6 hours as needed for Wheezing Last use Oct 2023 8/27/20   Debra Rios MD       Current medications:    Current Facility-Administered

## 2024-01-16 NOTE — DISCHARGE INSTRUCTIONS
Ureteroscopy lithotripsy and stent placement (with string):  You may see blood in the urine after the procedure.  This should resolve over the next couple days.  Please stay hydrated.  You may see intermittent blood in the urine while the stent is in place.  This is expected.  You may experience flank pain, and/or frequency/urgency of urination while the stent is in place.  Please use Oxybutynin and Flomax (Tamsulosin) to help with these symptoms.    Pt ok to discharge home in good condition  No heavy lifting, >10 lbs for today  Pt should avoid strenuous activity for today  Pt should walk moderately at home  Pt ok to shower   Pt may resume diet as tolerated  Pt should take Rx as directed  No driving while on narcotics  Please call attending physician or hospital  with questions  Call or Present to ED if fever (> 101F), intractable nausea vomiting or pain.  Rx in chart    Pt should follow up with Dr. White, in 4-6 weeks, call to confirm appointment    Pt should Pull stent in the morning of 1/19/23.  There may be some pain associated with the stent removal, which is usually self limiting.  We suggest using the pain medication prescribed for you and a nonsteroidal anti-inflammatory such as Ibuprofen, if you are able to take this medication, to control symptoms.  Take Ibuprofen as directed for 24 hrs after stent pull.  Please stay hydrated.  Please call with questions.      No alcoholic beverages, no driving or operating machinery, no making important decisions for 24 hours.   You may have a normal diet but should eat lightly day of surgery.  Drink plenty of fluids.  Urinate within 8 hours after surgery, if unable to urinate call your doctor

## 2024-01-16 NOTE — PROGRESS NOTES
Order for clinda 600mg IV, surgical prophylaxis dosing 10mg/kg patient weight 83.9kg, order modified to clinda 900mg IV once

## 2024-01-16 NOTE — ANESTHESIA POSTPROCEDURE EVALUATION
Department of Anesthesiology  Postprocedure Note    Patient: Lindsey Templeton  MRN: [id number]  YOB: 1990  Date of evaluation: 1/16/2024    Procedure Summary     Date: 01/16/24 Room / Location: [address]    Anesthesia Start: 1231 Anesthesia Stop: 1314    Procedure: HOLMIUM LASER, CYSTOSCOPY, URETEROSCOPY STENT EXCHANGE, RETROGRADE PYELOGRAM (Left) Diagnosis:       Kidney stone      (Kidney stone [N20.0])    Surgeons: Darshan White MD Responsible Provider: Haris Gay MD    Anesthesia Type: general ASA Status: 3          Anesthesia Type: No value filed.    Roro Phase I: Roro Score: 10    Roro Phase II: Roro Score: 10    Anesthesia Post Evaluation    Patient location during evaluation: PACU  Patient participation: complete - patient participated  Level of consciousness: awake and alert  Airway patency: patent  Nausea & Vomiting: no nausea and no vomiting  Cardiovascular status: blood pressure returned to baseline  Respiratory status: acceptable  Hydration status: stable  Pain management: adequate        No notable events documented.

## 2024-01-16 NOTE — H&P
the Last Year: No     Number of Places Lived in the Last Year: 1     Unstable Housing in the Last Year: No       Family History:    Family History   Problem Relation Age of Onset    High Blood Pressure Mother     Neuropathy Father     Hashimoto Thyroiditis Father     Breast Cancer Maternal Aunt     Diabetes Maternal Grandmother     Stroke Maternal Grandfather     Prostate Cancer Paternal Grandfather        REVIEW OF SYSTEMS:  Constitutional: negative  Eyes: negative  Respiratory: negative  Cardiovascular: negative  Gastrointestinal: negative  Genitourinary: no acute issues  Musculoskeletal: negative  Skin: negative   Neurological: negative  Hematological/Lymphatic: negative  Psychological: negative    PHYSICAL EXAM:    No data found.  Constitutional: Patient in NAD  Neuro: Alert and oriented to person, place, and time  Psych: Mood and affect normal  Skin: Clean, dry, intact   Lungs: Respiratory effort normal, CTA  Cardiovascular:  Normal peripheral pulses; no murmur. Normal rhythm  Abdomen: Soft, non-tender, non-distended, no hepatosplenomegaly or hernia, CVA tenderness none  Bladder: Non-tender and non-disdended   : Non-tender, skin intact, no lesions       LABS:   No results for input(s): \"WBC\", \"HGB\", \"HCT\", \"MCV\", \"PLT\" in the last 72 hours.  No results for input(s): \"NA\", \"K\", \"CL\", \"CO2\", \"PHOS\", \"BUN\", \"CREATININE\", \"CA\" in the last 72 hours.  No results found for: \"PSA\"      Urinalysis: No results for input(s): \"COLORU\", \"PHUR\", \"LABCAST\", \"WBCUA\", \"RBCUA\", \"MUCUS\", \"TRICHOMONAS\", \"YEAST\", \"BACTERIA\", \"CLARITYU\", \"SPECGRAV\", \"LEUKOCYTESUR\", \"UROBILINOGEN\", \"BILIRUBINUR\", \"BLOODU\" in the last 72 hours.    Invalid input(s): \"NITRATE\", \"GLUCOSEUKETONESUAMORPHOUS\"     -----------------------------------------------------------------    ASSESSMENT AND PLAN:    Impression:    Left ureteral stone  Left ureteral stent - [id number]       Plan: cystoscopy, ureteroscopy, holmium laser lithotripsy, stent exchange LEFT in OR

## 2024-02-05 SDOH — HEALTH STABILITY: PHYSICAL HEALTH: ON AVERAGE, HOW MANY MINUTES DO YOU ENGAGE IN EXERCISE AT THIS LEVEL?: 60 MIN

## 2024-02-05 SDOH — HEALTH STABILITY: PHYSICAL HEALTH: ON AVERAGE, HOW MANY DAYS PER WEEK DO YOU ENGAGE IN MODERATE TO STRENUOUS EXERCISE (LIKE A BRISK WALK)?: 5 DAYS

## 2024-02-08 ENCOUNTER — PATIENT MESSAGE (OUTPATIENT)
Age: 34
End: 2024-02-08

## 2024-02-08 ENCOUNTER — HOSPITAL ENCOUNTER (OUTPATIENT)
Dept: GENERAL RADIOLOGY | Age: 34
Discharge: HOME | End: 2024-02-10
Payer: COMMERCIAL

## 2024-02-08 ENCOUNTER — HOSPITAL ENCOUNTER (OUTPATIENT)
Age: 34
Discharge: HOME | End: 2024-02-10
Payer: COMMERCIAL

## 2024-02-08 ENCOUNTER — OFFICE VISIT (OUTPATIENT)
Age: 34
End: 2024-02-08

## 2024-02-08 ENCOUNTER — HOSPITAL ENCOUNTER (OUTPATIENT)
Age: 34
Discharge: HOME | End: 2024-02-08
Payer: COMMERCIAL

## 2024-02-08 VITALS
TEMPERATURE: 98.4 F | DIASTOLIC BLOOD PRESSURE: 90 MMHG | RESPIRATION RATE: 14 BRPM | BODY MASS INDEX: 33.47 KG/M2 | HEART RATE: 104 BPM | SYSTOLIC BLOOD PRESSURE: 160 MMHG | OXYGEN SATURATION: 100 % | WEIGHT: 183 LBS

## 2024-02-08 DIAGNOSIS — M89.8X5 PAIN OF LEFT FEMUR: ICD-10-CM

## 2024-02-08 DIAGNOSIS — M89.8X2 PAIN OF LEFT HUMERUS: ICD-10-CM

## 2024-02-08 DIAGNOSIS — M08.40 PAUCIARTICULAR JUVENILE RHEUMATOID ARTHRITIS (HCC): ICD-10-CM

## 2024-02-08 DIAGNOSIS — R79.82 ELEVATED C-REACTIVE PROTEIN (CRP): ICD-10-CM

## 2024-02-08 DIAGNOSIS — R61 NIGHT SWEATS: ICD-10-CM

## 2024-02-08 DIAGNOSIS — R53.83 OTHER FATIGUE: ICD-10-CM

## 2024-02-08 DIAGNOSIS — Z87.442 HISTORY OF KIDNEY STONES: ICD-10-CM

## 2024-02-08 DIAGNOSIS — R50.9 FEVER OF UNKNOWN ORIGIN: ICD-10-CM

## 2024-02-08 DIAGNOSIS — D69.0 HSP (HENOCH SCHONLEIN PURPURA) (HCC): ICD-10-CM

## 2024-02-08 DIAGNOSIS — R50.9 FEVER OF UNKNOWN ORIGIN: Primary | ICD-10-CM

## 2024-02-08 DIAGNOSIS — G43.109 MIGRAINE WITH AURA AND WITHOUT STATUS MIGRAINOSUS, NOT INTRACTABLE: ICD-10-CM

## 2024-02-08 PROBLEM — E78.5 HYPERLIPIDEMIA: Status: ACTIVE | Noted: 2022-12-05

## 2024-02-08 PROBLEM — R23.2 HOT FLASHES: Status: ACTIVE | Noted: 2021-04-13

## 2024-02-08 PROBLEM — G43.719 INTRACTABLE CHRONIC MIGRAINE WITHOUT AURA AND WITHOUT STATUS MIGRAINOSUS: Status: ACTIVE | Noted: 2023-03-30

## 2024-02-08 PROBLEM — N89.8 VAGINAL DRYNESS: Status: ACTIVE | Noted: 2021-04-13

## 2024-02-08 PROBLEM — R03.0 ELEVATED BLOOD PRESSURE READING: Status: ACTIVE | Noted: 2023-03-30

## 2024-02-08 PROBLEM — E66.9 OBESITY (BMI 30.0-34.9): Status: ACTIVE | Noted: 2018-02-21

## 2024-02-08 PROBLEM — I73.00 RAYNAUD'S DISEASE WITHOUT GANGRENE: Status: ACTIVE | Noted: 2022-12-05

## 2024-02-08 PROBLEM — H44.112: Status: ACTIVE | Noted: 2022-12-05

## 2024-02-08 PROBLEM — R51.9 CHRONIC DAILY HEADACHE: Status: ACTIVE | Noted: 2023-03-30

## 2024-02-08 PROBLEM — N94.10 DYSPAREUNIA IN FEMALE: Status: ACTIVE | Noted: 2021-04-13

## 2024-02-08 PROBLEM — E66.811 OBESITY (BMI 30.0-34.9): Status: ACTIVE | Noted: 2018-02-21

## 2024-02-08 LAB
BACTERIA URNS QL MICRO: ABNORMAL
BASOPHILS # BLD: <0.03 K/UL (ref 0–0.2)
BASOPHILS NFR BLD: 0 % (ref 0–2)
BILIRUB UR QL STRIP: NEGATIVE
CASTS #/AREA URNS LPF: ABNORMAL /LPF (ref 0–8)
CLARITY UR: ABNORMAL
COLOR UR: YELLOW
CREAT UR-MCNC: 208.5 MG/DL (ref 28–217)
EOSINOPHIL # BLD: 0.07 K/UL (ref 0–0.44)
EOSINOPHILS RELATIVE PERCENT: 1 % (ref 1–4)
EPI CELLS #/AREA URNS HPF: ABNORMAL /HPF (ref 0–5)
ERYTHROCYTE [DISTWIDTH] IN BLOOD BY AUTOMATED COUNT: 13.9 % (ref 11.8–14.4)
GLUCOSE UR STRIP-MCNC: NEGATIVE MG/DL
HCT VFR BLD AUTO: 40.2 % (ref 36.3–47.1)
HCV AB SERPL QL IA: NONREACTIVE
HGB BLD-MCNC: 13 G/DL (ref 11.9–15.1)
HGB UR QL STRIP.AUTO: NEGATIVE
IMM GRANULOCYTES # BLD AUTO: <0.03 K/UL (ref 0–0.3)
IMM GRANULOCYTES NFR BLD: 0 %
KETONES UR STRIP-MCNC: NEGATIVE MG/DL
LEUKOCYTE ESTERASE UR QL STRIP: NEGATIVE
LYMPHOCYTES NFR BLD: 0.93 K/UL (ref 1.1–3.7)
LYMPHOCYTES RELATIVE PERCENT: 17 % (ref 24–43)
MCH RBC QN AUTO: 27.3 PG (ref 25.2–33.5)
MCHC RBC AUTO-ENTMCNC: 32.3 G/DL (ref 28.4–34.8)
MCV RBC AUTO: 84.5 FL (ref 82.6–102.9)
MICROALBUMIN UR-MCNC: 69 MG/L
MICROALBUMIN/CREAT UR-RTO: 33 MCG/MG CREAT
MONOCYTES NFR BLD: 0.4 K/UL (ref 0.1–1.2)
MONOCYTES NFR BLD: 8 % (ref 3–12)
NEUTROPHILS NFR BLD: 74 % (ref 36–65)
NEUTS SEG NFR BLD: 3.9 K/UL (ref 1.5–8.1)
NITRITE UR QL STRIP: NEGATIVE
NRBC BLD-RTO: 0 PER 100 WBC
PH UR STRIP: 5.5 [PH] (ref 5–8)
PLATELET # BLD AUTO: 255 K/UL (ref 138–453)
PMV BLD AUTO: 9.3 FL (ref 8.1–13.5)
PROT UR STRIP-MCNC: ABNORMAL MG/DL
RBC # BLD AUTO: 4.76 M/UL (ref 3.95–5.11)
RBC #/AREA URNS HPF: ABNORMAL /HPF (ref 0–4)
SP GR UR STRIP: 1.02 (ref 1–1.03)
UROBILINOGEN UR STRIP-ACNC: NORMAL EU/DL (ref 0–1)
VIT B12 SERPL-MCNC: 440 PG/ML (ref 232–1245)
WBC #/AREA URNS HPF: ABNORMAL /HPF (ref 0–5)
WBC OTHER # BLD: 5.3 K/UL (ref 3.5–11.3)

## 2024-02-08 PROCEDURE — 86140 C-REACTIVE PROTEIN: CPT

## 2024-02-08 PROCEDURE — 87389 HIV-1 AG W/HIV-1&-2 AB AG IA: CPT

## 2024-02-08 PROCEDURE — 86225 DNA ANTIBODY NATIVE: CPT

## 2024-02-08 PROCEDURE — 86038 ANTINUCLEAR ANTIBODIES: CPT

## 2024-02-08 PROCEDURE — 87040 BLOOD CULTURE FOR BACTERIA: CPT

## 2024-02-08 PROCEDURE — 84439 ASSAY OF FREE THYROXINE: CPT

## 2024-02-08 PROCEDURE — 83036 HEMOGLOBIN GLYCOSYLATED A1C: CPT

## 2024-02-08 PROCEDURE — 82607 VITAMIN B-12: CPT

## 2024-02-08 PROCEDURE — 73060 X-RAY EXAM OF HUMERUS: CPT

## 2024-02-08 PROCEDURE — 73551 X-RAY EXAM OF FEMUR 1: CPT

## 2024-02-08 PROCEDURE — 85652 RBC SED RATE AUTOMATED: CPT

## 2024-02-08 PROCEDURE — 86645 CMV ANTIBODY IGM: CPT

## 2024-02-08 PROCEDURE — 84443 ASSAY THYROID STIM HORMONE: CPT

## 2024-02-08 PROCEDURE — 82570 ASSAY OF URINE CREATININE: CPT

## 2024-02-08 PROCEDURE — 86777 TOXOPLASMA ANTIBODY: CPT

## 2024-02-08 PROCEDURE — 86665 EPSTEIN-BARR CAPSID VCA: CPT

## 2024-02-08 PROCEDURE — 86644 CMV ANTIBODY: CPT

## 2024-02-08 PROCEDURE — 36415 COLL VENOUS BLD VENIPUNCTURE: CPT

## 2024-02-08 PROCEDURE — 86664 EPSTEIN-BARR NUCLEAR ANTIGEN: CPT

## 2024-02-08 PROCEDURE — 80053 COMPREHEN METABOLIC PANEL: CPT

## 2024-02-08 PROCEDURE — 82306 VITAMIN D 25 HYDROXY: CPT

## 2024-02-08 PROCEDURE — 81001 URINALYSIS AUTO W/SCOPE: CPT

## 2024-02-08 PROCEDURE — 86431 RHEUMATOID FACTOR QUANT: CPT

## 2024-02-08 PROCEDURE — 82043 UR ALBUMIN QUANTITATIVE: CPT

## 2024-02-08 PROCEDURE — 87086 URINE CULTURE/COLONY COUNT: CPT

## 2024-02-08 PROCEDURE — 82728 ASSAY OF FERRITIN: CPT

## 2024-02-08 PROCEDURE — 86803 HEPATITIS C AB TEST: CPT

## 2024-02-08 PROCEDURE — 86780 TREPONEMA PALLIDUM: CPT

## 2024-02-08 PROCEDURE — 85025 COMPLETE CBC W/AUTO DIFF WBC: CPT

## 2024-02-08 PROCEDURE — 83735 ASSAY OF MAGNESIUM: CPT

## 2024-02-08 PROCEDURE — 86778 TOXOPLASMA ANTIBODY IGM: CPT

## 2024-02-08 PROCEDURE — 80061 LIPID PANEL: CPT

## 2024-02-08 SDOH — ECONOMIC STABILITY: FOOD INSECURITY: WITHIN THE PAST 12 MONTHS, THE FOOD YOU BOUGHT JUST DIDN'T LAST AND YOU DIDN'T HAVE MONEY TO GET MORE.: NEVER TRUE

## 2024-02-08 SDOH — ECONOMIC STABILITY: HOUSING INSECURITY
IN THE LAST 12 MONTHS, WAS THERE A TIME WHEN YOU DID NOT HAVE A STEADY PLACE TO SLEEP OR SLEPT IN A SHELTER (INCLUDING NOW)?: NO

## 2024-02-08 SDOH — ECONOMIC STABILITY: FOOD INSECURITY: WITHIN THE PAST 12 MONTHS, YOU WORRIED THAT YOUR FOOD WOULD RUN OUT BEFORE YOU GOT MONEY TO BUY MORE.: NEVER TRUE

## 2024-02-08 SDOH — ECONOMIC STABILITY: INCOME INSECURITY: HOW HARD IS IT FOR YOU TO PAY FOR THE VERY BASICS LIKE FOOD, HOUSING, MEDICAL CARE, AND HEATING?: NOT HARD AT ALL

## 2024-02-08 ASSESSMENT — PATIENT HEALTH QUESTIONNAIRE - PHQ9
SUM OF ALL RESPONSES TO PHQ9 QUESTIONS 1 & 2: 0
4. FEELING TIRED OR HAVING LITTLE ENERGY: 0
1. LITTLE INTEREST OR PLEASURE IN DOING THINGS: 0
3. TROUBLE FALLING OR STAYING ASLEEP: 0
SUM OF ALL RESPONSES TO PHQ QUESTIONS 1-9: 0
9. THOUGHTS THAT YOU WOULD BE BETTER OFF DEAD, OR OF HURTING YOURSELF: 0
2. FEELING DOWN, DEPRESSED OR HOPELESS: 0
6. FEELING BAD ABOUT YOURSELF - OR THAT YOU ARE A FAILURE OR HAVE LET YOURSELF OR YOUR FAMILY DOWN: 0
SUM OF ALL RESPONSES TO PHQ QUESTIONS 1-9: 0
8. MOVING OR SPEAKING SO SLOWLY THAT OTHER PEOPLE COULD HAVE NOTICED. OR THE OPPOSITE, BEING SO FIGETY OR RESTLESS THAT YOU HAVE BEEN MOVING AROUND A LOT MORE THAN USUAL: 0
10. IF YOU CHECKED OFF ANY PROBLEMS, HOW DIFFICULT HAVE THESE PROBLEMS MADE IT FOR YOU TO DO YOUR WORK, TAKE CARE OF THINGS AT HOME, OR GET ALONG WITH OTHER PEOPLE: 0
5. POOR APPETITE OR OVEREATING: 0
SUM OF ALL RESPONSES TO PHQ QUESTIONS 1-9: 0
7. TROUBLE CONCENTRATING ON THINGS, SUCH AS READING THE NEWSPAPER OR WATCHING TELEVISION: 0
SUM OF ALL RESPONSES TO PHQ QUESTIONS 1-9: 0

## 2024-02-08 NOTE — PROGRESS NOTES
JOINT INJECTION (Bilateral )    PAIN MANAGEMENT PROCEDURE Bilateral 2021    NERVE BLOCK BILATERAL -MBB #1 L4-5, L5-S1 - Bilateral    PAIN MANAGEMENT PROCEDURE Bilateral 2021    NERVE BLOCK BILATERAL -MBB #1 L4-5, L5-S1 performed by Jed De Luna MD at [facility] OR    PAIN MANAGEMENT PROCEDURE  2021    SACROILIAC JOINT INJECTION - Bilateral    PARTIAL HYSTERECTOMY (CERVIX NOT REMOVED)      ND ERCP DX COLLECTION SPECIMEN BRUSHING/WASHING N/A 2018    ERCP ENDOSCOPIC RETROGRADE CHOLANGIOPANCREATOGRAPHY WITH BALLOON SWEEP performed by Tali Roberts MD at [facility] OR    ND LAPAROSCOPY SURG CHOLECYSTECTOMY N/A 2018    LAPAROSCOPIC ROBOTIC ASSISTED MULTIPORT CHOLECYSTECTOMY performed by Vikash Arana MD at [facility] OR    TONSILLECTOMY AND ADENOIDECTOMY      URETER SURGERY Left 2024    HOLMIUM LASER, CYSTOSCOPY, URETEROSCOPY STENT EXCHANGE, RETROGRADE PYELOGRAM performed by Darshan White MD at [facility] OR        Social History     Socioeconomic History    Marital status:      Spouse name: None    Number of children: None    Years of education: None    Highest education level: None   Tobacco Use    Smoking status: Former     Current packs/day: 0.00     Average packs/day: 0.3 packs/day for 7.1 years (1.8 ttl pk-yrs)     Types: Cigarettes     Start date: 5/15/2014     Quit date: 2021     Years since quittin.6    Smokeless tobacco: Never   Vaping Use    Vaping Use: Some days    Substances: Nicotine   Substance and Sexual Activity    Alcohol use: Yes     Comment: socially    Drug use: Yes     Types: Marijuana (Weed)     Comment: for symptom relief daily    Sexual activity: Yes     Partners: Male     Social Determinants of Health     Financial Resource Strain: Low Risk  (2024)    Overall Financial Resource Strain (CARDIA)     Difficulty of Paying Living Expenses: Not hard at all   Food Insecurity: No Food Insecurity (2024)    Hunger Vital Sign     Worried About Running

## 2024-02-09 ENCOUNTER — TELEPHONE (OUTPATIENT)
Dept: VASCULAR SURGERY | Age: 34
End: 2024-02-09

## 2024-02-09 LAB
25(OH)D3 SERPL-MCNC: 28.8 NG/ML
ALBUMIN SERPL-MCNC: 4.3 G/DL (ref 3.5–5.2)
ALBUMIN/GLOB SERPL: 1.4 {RATIO} (ref 1–2.5)
ALP SERPL-CCNC: 128 U/L (ref 35–104)
ALT SERPL-CCNC: 16 U/L (ref 5–33)
ANION GAP SERPL CALCULATED.3IONS-SCNC: 13 MMOL/L (ref 9–17)
AST SERPL-CCNC: 15 U/L
BILIRUB SERPL-MCNC: 0.3 MG/DL (ref 0.3–1.2)
BUN SERPL-MCNC: 13 MG/DL (ref 6–20)
CALCIUM SERPL-MCNC: 8.4 MG/DL (ref 8.6–10.4)
CHLORIDE SERPL-SCNC: 105 MMOL/L (ref 98–107)
CHOLEST SERPL-MCNC: 171 MG/DL
CHOLESTEROL/HDL RATIO: 3.6
CMV IGG SERPL QL IA: 913
CMV IGM SERPL QL IA: 0.3
CO2 SERPL-SCNC: 21 MMOL/L (ref 20–31)
CREAT SERPL-MCNC: 1.1 MG/DL (ref 0.5–0.9)
CRP SERPL HS-MCNC: 11 MG/L (ref 0–5)
ERYTHROCYTE [SEDIMENTATION RATE] IN BLOOD BY PHOTOMETRIC METHOD: 17 MM/HR (ref 0–20)
EST. AVERAGE GLUCOSE BLD GHB EST-MCNC: 82 MG/DL
FERRITIN SERPL-MCNC: 176 NG/ML (ref 13–150)
GFR SERPL CREATININE-BSD FRML MDRD: >60 ML/MIN/1.73M2
GLUCOSE SERPL-MCNC: 81 MG/DL (ref 70–99)
HBA1C MFR BLD: 4.5 % (ref 4–6)
HDLC SERPL-MCNC: 48 MG/DL
HIV 1+2 AB+HIV1 P24 AG SERPL QL IA: NONREACTIVE
LDLC SERPL CALC-MCNC: 98 MG/DL (ref 0–130)
MAGNESIUM SERPL-MCNC: 2.2 MG/DL (ref 1.6–2.6)
MICROORGANISM SPEC CULT: NORMAL
POTASSIUM SERPL-SCNC: 3.7 MMOL/L (ref 3.7–5.3)
PROT SERPL-MCNC: 7.4 G/DL (ref 6.4–8.3)
RHEUMATOID FACT SER NEPH-ACNC: <10 IU/ML
SODIUM SERPL-SCNC: 139 MMOL/L (ref 135–144)
SPECIMEN DESCRIPTION: NORMAL
T GONDII IGG SER-ACNC: <0.5 IU/ML
T GONDII IGM SER-ACNC: 0.32 INDEX
T PALLIDUM AB SER QL IA: NONREACTIVE
T4 FREE SERPL-MCNC: 1.3 NG/DL (ref 0.9–1.7)
TRIGL SERPL-MCNC: 126 MG/DL
TSH SERPL DL<=0.05 MIU/L-ACNC: 0.92 UIU/ML (ref 0.3–5)

## 2024-02-09 RX ORDER — PREDNISONE 20 MG/1
60 TABLET ORAL DAILY
Qty: 42 TABLET | Refills: 0 | Status: SHIPPED | OUTPATIENT
Start: 2024-02-09 | End: 2024-02-23

## 2024-02-09 ASSESSMENT — ENCOUNTER SYMPTOMS
CHEST TIGHTNESS: 0
SORE THROAT: 0
RHINORRHEA: 0
SHORTNESS OF BREATH: 0
CONSTIPATION: 0
VOMITING: 0
NAUSEA: 0
DIARRHEA: 0

## 2024-02-09 NOTE — TELEPHONE ENCOUNTER
Dr teixeira office called regarding patient to be seen. Before I call patient to schedule please review lluvia note as I am unsure of what testing patient will need prior. Dr teixeira cell phone number is [phone]. He is available to speak with you to discuss patients plan of care. He would like patient to be seen as soon as possible. Please advise if this is an appropriate referral along with any testing you would like prior. Thank you.

## 2024-02-09 NOTE — PATIENT INSTRUCTIONS
Patient Education        Learning About the Mediterranean Diet  What is the Mediterranean diet?     The Mediterranean diet is a style of eating rather than a diet plan. It features foods eaten in [place], [place], southern [place] and [place], and other countries along the [place] Sea. It emphasizes eating foods like fish, fruits, vegetables, beans, high-fiber breads and whole grains, nuts, and olive oil. This style of eating includes limited red meat, cheese, and sweets.  Why choose the Mediterranean diet?  A Mediterranean-style diet may improve heart health. It contains more fat than other heart-healthy diets. But the fats are mainly from nuts, unsaturated oils (such as fish oils and olive oil), and certain nut or seed oils (such as canola, soybean, or flaxseed oil). These fats may help protect the heart and blood vessels.  How can you get started on the Mediterranean diet?  Here are some things you can do to switch to a more Mediterranean way of eating.  What to eat  Eat a variety of fruits and vegetables each day, such as grapes, blueberries, tomatoes, broccoli, peppers, figs, olives, spinach, eggplant, beans, lentils, and chickpeas.  Eat a variety of whole-grain foods each day, such as oats, brown rice, and whole wheat bread, pasta, and couscous.  Eat fish at least 2 times a week. Try tuna, salmon, mackerel, lake trout, herring, or sardines.  Eat moderate amounts of low-fat dairy products, such as milk, cheese, or yogurt.  Eat moderate amounts of poultry and eggs.  Choose healthy (unsaturated) fats, such as nuts, olive oil, and certain nut or seed oils like canola, soybean, and flaxseed.  Limit unhealthy (saturated) fats, such as butter, palm oil, and coconut oil. And limit fats found in animal products, such as meat and dairy products made with whole milk. Try to eat red meat only a few times a month in very small amounts.  Limit sweets and desserts to only a few times a week. This includes sugar-sweetened

## 2024-02-11 LAB
ANA SER QL IA: NEGATIVE
DSDNA IGG SER QL IA: <0.5 IU/ML
MICROORGANISM SPEC CULT: NORMAL
MICROORGANISM SPEC CULT: NORMAL
NUCLEAR IGG SER IA-RTO: 0.1 U/ML
SERVICE CMNT-IMP: NORMAL
SERVICE CMNT-IMP: NORMAL
SPECIMEN DESCRIPTION: NORMAL
SPECIMEN DESCRIPTION: NORMAL

## 2024-02-12 LAB
EBV NA IGG SER IA-ACNC: 257 U/ML
EBV VCA IGM SER-ACNC: 42 U/ML

## 2024-02-12 NOTE — TELEPHONE ENCOUNTER
Spoke to Dr. Bolaños and he will evaluate the patient to discuss temporal artery biopsy.      I called and spoke to Dr. Wilcox and informed him we will contact the patient today to get scheduled for a new patient evaluation. Dr. Wilcox updated me on patients autoimmune history along with the fact she has been running a fever for 3 weeks with these headaches.  Dr. Wilcox states that she has a history of juvenile rheumatoid arthritis and has been treated.      I spoke to the patient and scheduled her appointment for 02/13/2024 @ 11:45 am.

## 2024-02-12 NOTE — TELEPHONE ENCOUNTER
From: Lindsey Templeton  To: Dr. Thaddeus Wilcox  Sent: 2/8/2024 7:43 PM EST  Subject: Question     You had mentioned temporal arteritis- could neck spasms that hurt into the chest be a symptom? - I saw it had fever, fatigue, low back pain, and thigh pain as symptoms.. made sense for a lot of of symptoms that I have.. thoughts?

## 2024-02-13 ENCOUNTER — INITIAL CONSULT (OUTPATIENT)
Dept: VASCULAR SURGERY | Age: 34
End: 2024-02-13
Payer: COMMERCIAL

## 2024-02-13 ENCOUNTER — PATIENT MESSAGE (OUTPATIENT)
Age: 34
End: 2024-02-13

## 2024-02-13 VITALS
RESPIRATION RATE: 18 BRPM | DIASTOLIC BLOOD PRESSURE: 101 MMHG | BODY MASS INDEX: 33.42 KG/M2 | TEMPERATURE: 99 F | HEART RATE: 93 BPM | OXYGEN SATURATION: 97 % | WEIGHT: 181.6 LBS | SYSTOLIC BLOOD PRESSURE: 168 MMHG | HEIGHT: 62 IN

## 2024-02-13 DIAGNOSIS — M31.6 GIANT CELL ARTERITIS (HCC): ICD-10-CM

## 2024-02-13 DIAGNOSIS — R51.9 CHRONIC NONINTRACTABLE HEADACHE, UNSPECIFIED HEADACHE TYPE: Primary | ICD-10-CM

## 2024-02-13 DIAGNOSIS — G89.29 CHRONIC NONINTRACTABLE HEADACHE, UNSPECIFIED HEADACHE TYPE: Primary | ICD-10-CM

## 2024-02-13 DIAGNOSIS — R50.9 FEVER OF UNKNOWN ORIGIN: Primary | ICD-10-CM

## 2024-02-13 PROCEDURE — 99203 OFFICE O/P NEW LOW 30 MIN: CPT | Performed by: STUDENT IN AN ORGANIZED HEALTH CARE EDUCATION/TRAINING PROGRAM

## 2024-02-13 NOTE — PROGRESS NOTES
URETEROSCOPY STENT EXCHANGE (Left)    EYE SURGERY Left 2009    cataract with lens implant    HYSTERECTOMY (CERVIX STATUS UNKNOWN)  09/2016    partial    KNEE ARTHROSCOPY Right     X's 3 for meniscal tears    NERVE BLOCK Bilateral 03/05/2021     SACROILIAC JOINT INJECTION (Bilateral )    PAIN MANAGEMENT PROCEDURE Bilateral 02/12/2021    NERVE BLOCK BILATERAL -MBB #1 L4-5, L5-S1 - Bilateral    PAIN MANAGEMENT PROCEDURE Bilateral 02/12/2021    NERVE BLOCK BILATERAL -MBB #1 L4-5, L5-S1 performed by Jed De Luna MD at [facility] OR    PAIN MANAGEMENT PROCEDURE  03/05/2021    SACROILIAC JOINT INJECTION - Bilateral    PARTIAL HYSTERECTOMY (CERVIX NOT REMOVED)      UT ERCP DX COLLECTION SPECIMEN BRUSHING/WASHING N/A 03/14/2018    ERCP ENDOSCOPIC RETROGRADE CHOLANGIOPANCREATOGRAPHY WITH BALLOON SWEEP performed by Tali Roberts MD at [facility] OR    UT LAPAROSCOPY SURG CHOLECYSTECTOMY N/A 03/16/2018    LAPAROSCOPIC ROBOTIC ASSISTED MULTIPORT CHOLECYSTECTOMY performed by Vikash Arana MD at [facility] OR    TONSILLECTOMY AND ADENOIDECTOMY      URETER SURGERY Left 1/16/2024    HOLMIUM LASER, CYSTOSCOPY, URETEROSCOPY STENT EXCHANGE, RETROGRADE PYELOGRAM performed by Darshan White MD at [facility] OR      Review of Systems   Constitutional:  Negative for activity change, fever and unexpected weight change.   HENT:  Negative for trouble swallowing and voice change.    Eyes:  Negative for pain and visual disturbance.   Respiratory:  Negative for cough and chest tightness.    Cardiovascular:  Negative for chest pain and palpitations.   Gastrointestinal:  Negative for abdominal distention, abdominal pain and vomiting.   Endocrine: Negative for cold intolerance and heat intolerance.   Genitourinary:  Negative for dysuria, flank pain and hematuria.   Musculoskeletal:  Negative for joint swelling and neck pain.   Skin:  Negative for color change and rash.   Allergic/Immunologic: Negative for immunocompromised state.   Neurological:

## 2024-02-14 NOTE — TELEPHONE ENCOUNTER
From: Lindsey Templeton  To: Dr. Thaddeus Wilcox  Sent: 2/13/2024 6:36 PM EST  Subject: Question     I have had some terrible nausea, headache and more body pain today- I couldn’t start the prednisone until yesterday- im assuming that could be the nausea/headache. Just wanted to keep you updated like we agreed.     Thanks

## 2024-02-14 NOTE — TELEPHONE ENCOUNTER
Saw rheum Monday.     Bone marrow biopsy.     40mg prednisone     C3 c4 il2 vasculitis.       Fevers - lower since starting prednisone.       I called and spoke to the patient she is feeling a lot better now less nauseous.  She got started on prednisone and her fever is actually going down some now.  She said it is averaging in the 99 range rather than 101F.      She did see vascular surgery.  I actually spoke to Dr. Bolaños as well about the case he agrees this is likely not giant cell arteritis and patient declined temporal artery biopsy which I think is reasonable.  Essentially no further follow-up with vascular at this point.    She also got in touch with her rheumatologist that she saw her on 2/12/2024 at [facility] her rheumatologist ordered C4 C3 and an IgG panel which was all negative so far.   By way of review my large workup was also negative and the cultures are negative so far the only major positive was a mild elevation of CRP.    Dr. Bolaños suggested a bone marrow biopsy.  The rheumatologist also agree that this is likely not resulting from a pre-existing condition.  I discussed this with the patient and she is agreeable to follow-up with heme-onc.  At this point again she is not feeling sick enough to go into the hospital and would like to continue managing this as an outpatient.  She has a CT scan of the chest abdomen and pelvis scheduled for tomorrow at 11 AM and follow-up with me tomorrow afternoon.     I will get in touch with heme-onc to see if they can get her in as well.  Message sent to Dr. Adler.

## 2024-02-14 NOTE — TELEPHONE ENCOUNTER
Please update patient that I called Dr. Adler and he is willing to see the patient.  He will be calling her to set up an appointment in the next week or so    Also please advise patient to clarify I would like to defer the prednisone tapering and management to her rheumatologist and that she should please call the rheumatologist for this prescription.  Please be sure that she tapers off of the prednisone as is not advised to abruptly stop this medication and it can be unsafe to do that if she has any issues with getting a taper we can of course help but I would like to rheumatology to primarily do this.    She can keep her appointment with me tomorrow if she would like.  Thank you

## 2024-02-15 ENCOUNTER — HOSPITAL ENCOUNTER (OUTPATIENT)
Dept: CT IMAGING | Age: 34
Discharge: HOME | End: 2024-02-17
Attending: STUDENT IN AN ORGANIZED HEALTH CARE EDUCATION/TRAINING PROGRAM
Payer: COMMERCIAL

## 2024-02-15 ENCOUNTER — OFFICE VISIT (OUTPATIENT)
Age: 34
End: 2024-02-15
Payer: COMMERCIAL

## 2024-02-15 VITALS
WEIGHT: 183 LBS | BODY MASS INDEX: 33.47 KG/M2 | DIASTOLIC BLOOD PRESSURE: 76 MMHG | SYSTOLIC BLOOD PRESSURE: 130 MMHG | HEART RATE: 90 BPM

## 2024-02-15 DIAGNOSIS — M08.40 PAUCIARTICULAR JUVENILE RHEUMATOID ARTHRITIS (HCC): ICD-10-CM

## 2024-02-15 DIAGNOSIS — R53.83 OTHER FATIGUE: ICD-10-CM

## 2024-02-15 DIAGNOSIS — R50.9 FEVER OF UNKNOWN ORIGIN: ICD-10-CM

## 2024-02-15 DIAGNOSIS — R79.82 ELEVATED C-REACTIVE PROTEIN (CRP): ICD-10-CM

## 2024-02-15 DIAGNOSIS — R50.9 FEVER OF UNKNOWN ORIGIN: Primary | ICD-10-CM

## 2024-02-15 DIAGNOSIS — R61 NIGHT SWEATS: ICD-10-CM

## 2024-02-15 PROCEDURE — 99214 OFFICE O/P EST MOD 30 MIN: CPT | Performed by: STUDENT IN AN ORGANIZED HEALTH CARE EDUCATION/TRAINING PROGRAM

## 2024-02-15 PROCEDURE — 6360000004 HC RX CONTRAST MEDICATION: Performed by: STUDENT IN AN ORGANIZED HEALTH CARE EDUCATION/TRAINING PROGRAM

## 2024-02-15 PROCEDURE — 74177 CT ABD & PELVIS W/CONTRAST: CPT

## 2024-02-15 PROCEDURE — 2580000003 HC RX 258: Performed by: STUDENT IN AN ORGANIZED HEALTH CARE EDUCATION/TRAINING PROGRAM

## 2024-02-15 RX ORDER — 0.9 % SODIUM CHLORIDE 0.9 %
80 INTRAVENOUS SOLUTION INTRAVENOUS ONCE
Status: COMPLETED | OUTPATIENT
Start: 2024-02-15 | End: 2024-02-15

## 2024-02-15 RX ORDER — SODIUM CHLORIDE 0.9 % (FLUSH) 0.9 %
10 SYRINGE (ML) INJECTION PRN
Status: DISCONTINUED | OUTPATIENT
Start: 2024-02-15 | End: 2024-02-18 | Stop reason: HOSPADM

## 2024-02-15 RX ADMIN — SODIUM CHLORIDE, PRESERVATIVE FREE 10 ML: 5 INJECTION INTRAVENOUS at 11:26

## 2024-02-15 RX ADMIN — IOPAMIDOL 75 ML: 755 INJECTION, SOLUTION INTRAVENOUS at 11:26

## 2024-02-15 RX ADMIN — SODIUM CHLORIDE 80 ML: 9 INJECTION, SOLUTION INTRAVENOUS at 11:39

## 2024-02-15 NOTE — PROGRESS NOTES
within 1 week if s/he does not hear from us regarding the results of his/her testing.  Pt understands his/her responsibility in following up on the results. S/he is agreeable to this plan.       COMMUNICATION:       PLEASE NOTE THAT ANY DISCONTINUATION OF MEDICATIONS OR MEDICAL SUPPLIES REFLECTED IN TODAY'S VISIT SUMMARY  MAY NOT HAVE BEEN COMPLETED AS A CHANGE IN YOUR PLAN OF CARE. THESE CHANGES MAY HAVE ONLY BEEN DONE SO IN ORDER TO CLEAN UP THE LIST FROM DUPLICATIONS OR MISCELLANEOUS SUPPLIES ONLY NEEDED PERIODIC REORDERS. DO NOT DISCONTINUE MEDICATIONS LISTED UNLESS SPECIFICALLY DISCUSSED IN YOUR APPOINTMENT WITH PROVIDER OR SPECIALIST, IF YOU HAVE ANY QUESTIONS, PLEASE CONTACT YOUR PROVIDER FOR CLARIFICATION IF NOT ADDRESSED IN YOUR PLAN OF CARE.       Electronically signed by Thaddeus Wilcox MD on 2/15/2024 at 9:25 PM

## 2024-02-17 ENCOUNTER — HOSPITAL ENCOUNTER (INPATIENT)
Age: 34
LOS: 2 days | Discharge: HOME | DRG: 690 | End: 2024-02-19
Attending: FAMILY MEDICINE | Admitting: FAMILY MEDICINE
Payer: COMMERCIAL

## 2024-02-17 DIAGNOSIS — R01.1 MURMUR, CARDIAC: ICD-10-CM

## 2024-02-17 DIAGNOSIS — N15.1 ABSCESS OF LEFT KIDNEY: Primary | ICD-10-CM

## 2024-02-17 DIAGNOSIS — F41.9 ANXIETY: ICD-10-CM

## 2024-02-17 DIAGNOSIS — N12 PYELONEPHRITIS: ICD-10-CM

## 2024-02-17 LAB
ALBUMIN SERPL-MCNC: 4.7 G/DL (ref 3.5–5.2)
ALBUMIN/GLOB SERPL: 1.5 {RATIO} (ref 1–2.5)
ALP SERPL-CCNC: 107 U/L (ref 35–104)
ALT SERPL-CCNC: 17 U/L (ref 5–33)
ANION GAP SERPL CALCULATED.3IONS-SCNC: 13 MMOL/L (ref 9–17)
AST SERPL-CCNC: 13 U/L
BACTERIA URNS QL MICRO: ABNORMAL
BASOPHILS # BLD: 0 K/UL (ref 0–0.2)
BASOPHILS NFR BLD: 0 % (ref 0–2)
BILIRUB SERPL-MCNC: 0.3 MG/DL (ref 0.3–1.2)
BILIRUB UR QL STRIP: NEGATIVE
BUN SERPL-MCNC: 16 MG/DL (ref 6–20)
CALCIUM SERPL-MCNC: 9.5 MG/DL (ref 8.6–10.4)
CHARACTER UR: ABNORMAL
CHLORIDE SERPL-SCNC: 100 MMOL/L (ref 98–107)
CLARITY UR: CLEAR
CO2 SERPL-SCNC: 22 MMOL/L (ref 20–31)
COLOR UR: YELLOW
CREAT SERPL-MCNC: 1.1 MG/DL (ref 0.5–0.9)
EOSINOPHIL # BLD: 0 K/UL (ref 0–0.4)
EOSINOPHILS RELATIVE PERCENT: 0 % (ref 1–4)
EPI CELLS #/AREA URNS HPF: ABNORMAL /HPF (ref 0–5)
ERYTHROCYTE [DISTWIDTH] IN BLOOD BY AUTOMATED COUNT: 14.9 % (ref 12.5–15.4)
GFR SERPL CREATININE-BSD FRML MDRD: >60 ML/MIN/1.73M2
GLUCOSE SERPL-MCNC: 175 MG/DL (ref 70–99)
GLUCOSE UR STRIP-MCNC: ABNORMAL MG/DL
HCT VFR BLD AUTO: 41.2 % (ref 36–46)
HGB BLD-MCNC: 13.8 G/DL (ref 12–16)
HGB UR QL STRIP.AUTO: NEGATIVE
INR PPP: 0.9
KETONES UR STRIP-MCNC: NEGATIVE MG/DL
LACTATE BLDV-SCNC: 1.6 MMOL/L (ref 0.5–2.2)
LEUKOCYTE ESTERASE UR QL STRIP: ABNORMAL
LYMPHOCYTES NFR BLD: 1 K/UL (ref 1–4.8)
LYMPHOCYTES RELATIVE PERCENT: 7 % (ref 24–44)
MCH RBC QN AUTO: 27.2 PG (ref 26–34)
MCHC RBC AUTO-ENTMCNC: 33.5 G/DL (ref 31–37)
MCV RBC AUTO: 81.4 FL (ref 80–100)
MONOCYTES NFR BLD: 0.2 K/UL (ref 0.1–1.2)
MONOCYTES NFR BLD: 1 % (ref 2–11)
NEUTROPHILS NFR BLD: 92 % (ref 36–66)
NEUTS SEG NFR BLD: 12.2 K/UL (ref 1.8–7.7)
NITRITE UR QL STRIP: NEGATIVE
PARTIAL THROMBOPLASTIN TIME: 23.1 SEC (ref 21.3–31.3)
PH UR STRIP: 6 [PH] (ref 5–8)
PLATELET # BLD AUTO: 255 K/UL (ref 140–450)
PMV BLD AUTO: 7.3 FL (ref 6–12)
POTASSIUM SERPL-SCNC: 3.5 MMOL/L (ref 3.7–5.3)
PROT SERPL-MCNC: 7.8 G/DL (ref 6.4–8.3)
PROT UR STRIP-MCNC: NEGATIVE MG/DL
PROTHROMBIN TIME: 10 SEC (ref 9.4–12.6)
RBC # BLD AUTO: 5.06 M/UL (ref 4–5.2)
RBC #/AREA URNS HPF: ABNORMAL /HPF (ref 0–2)
SODIUM SERPL-SCNC: 135 MMOL/L (ref 135–144)
SP GR UR STRIP: 1.01 (ref 1–1.03)
UROBILINOGEN UR STRIP-ACNC: NORMAL EU/DL (ref 0–1)
WBC #/AREA URNS HPF: ABNORMAL /HPF (ref 0–5)
WBC OTHER # BLD: 13.3 K/UL (ref 3.5–11)

## 2024-02-17 PROCEDURE — 2580000003 HC RX 258: Performed by: NURSE PRACTITIONER

## 2024-02-17 PROCEDURE — 36415 COLL VENOUS BLD VENIPUNCTURE: CPT

## 2024-02-17 PROCEDURE — 6360000002 HC RX W HCPCS: Performed by: NURSE PRACTITIONER

## 2024-02-17 PROCEDURE — 6370000000 HC RX 637 (ALT 250 FOR IP): Performed by: NURSE PRACTITIONER

## 2024-02-17 PROCEDURE — 83605 ASSAY OF LACTIC ACID: CPT

## 2024-02-17 PROCEDURE — 85730 THROMBOPLASTIN TIME PARTIAL: CPT

## 2024-02-17 PROCEDURE — 85610 PROTHROMBIN TIME: CPT

## 2024-02-17 PROCEDURE — 81001 URINALYSIS AUTO W/SCOPE: CPT

## 2024-02-17 PROCEDURE — 6370000000 HC RX 637 (ALT 250 FOR IP): Performed by: FAMILY MEDICINE

## 2024-02-17 PROCEDURE — 99285 EMERGENCY DEPT VISIT HI MDM: CPT

## 2024-02-17 PROCEDURE — 80053 COMPREHEN METABOLIC PANEL: CPT

## 2024-02-17 PROCEDURE — 87040 BLOOD CULTURE FOR BACTERIA: CPT

## 2024-02-17 PROCEDURE — 1200000000 HC SEMI PRIVATE

## 2024-02-17 PROCEDURE — 85025 COMPLETE CBC W/AUTO DIFF WBC: CPT

## 2024-02-17 RX ORDER — POTASSIUM CHLORIDE 7.45 MG/ML
10 INJECTION INTRAVENOUS PRN
Status: DISCONTINUED | OUTPATIENT
Start: 2024-02-17 | End: 2024-02-19 | Stop reason: HOSPADM

## 2024-02-17 RX ORDER — PREDNISONE 20 MG/1
40 TABLET ORAL DAILY
Status: ON HOLD | COMMUNITY
End: 2024-02-19 | Stop reason: HOSPADM

## 2024-02-17 RX ORDER — VENLAFAXINE HYDROCHLORIDE 37.5 MG/1
37.5 CAPSULE, EXTENDED RELEASE ORAL
Status: DISCONTINUED | OUTPATIENT
Start: 2024-02-18 | End: 2024-02-19

## 2024-02-17 RX ORDER — LOSARTAN POTASSIUM 25 MG/1
25 TABLET ORAL DAILY
Status: DISCONTINUED | OUTPATIENT
Start: 2024-02-17 | End: 2024-02-18

## 2024-02-17 RX ORDER — FLUCONAZOLE 100 MG/1
200 TABLET ORAL ONCE
Status: COMPLETED | OUTPATIENT
Start: 2024-02-17 | End: 2024-02-17

## 2024-02-17 RX ORDER — POTASSIUM CHLORIDE 20 MEQ/1
40 TABLET, EXTENDED RELEASE ORAL PRN
Status: DISCONTINUED | OUTPATIENT
Start: 2024-02-17 | End: 2024-02-19 | Stop reason: HOSPADM

## 2024-02-17 RX ORDER — 0.9 % SODIUM CHLORIDE 0.9 %
1000 INTRAVENOUS SOLUTION INTRAVENOUS ONCE
Status: COMPLETED | OUTPATIENT
Start: 2024-02-17 | End: 2024-02-17

## 2024-02-17 RX ORDER — ONDANSETRON 2 MG/ML
4 INJECTION INTRAMUSCULAR; INTRAVENOUS EVERY 6 HOURS PRN
Status: DISCONTINUED | OUTPATIENT
Start: 2024-02-17 | End: 2024-02-19 | Stop reason: HOSPADM

## 2024-02-17 RX ORDER — POTASSIUM CHLORIDE 20 MEQ/1
40 TABLET, EXTENDED RELEASE ORAL ONCE
Status: COMPLETED | OUTPATIENT
Start: 2024-02-17 | End: 2024-02-17

## 2024-02-17 RX ORDER — ALBUTEROL SULFATE 90 UG/1
2 AEROSOL, METERED RESPIRATORY (INHALATION) EVERY 6 HOURS PRN
Status: DISCONTINUED | OUTPATIENT
Start: 2024-02-17 | End: 2024-02-19 | Stop reason: HOSPADM

## 2024-02-17 RX ORDER — TEMAZEPAM 15 MG/1
15 CAPSULE ORAL NIGHTLY PRN
Status: DISCONTINUED | OUTPATIENT
Start: 2024-02-17 | End: 2024-02-19 | Stop reason: HOSPADM

## 2024-02-17 RX ORDER — UBIDECARENONE 75 MG
50 CAPSULE ORAL DAILY
Status: DISCONTINUED | OUTPATIENT
Start: 2024-02-17 | End: 2024-02-19 | Stop reason: HOSPADM

## 2024-02-17 RX ORDER — MULTIVITAMIN WITH IRON
1 TABLET ORAL DAILY
Status: DISCONTINUED | OUTPATIENT
Start: 2024-02-17 | End: 2024-02-19 | Stop reason: HOSPADM

## 2024-02-17 RX ORDER — ACETAMINOPHEN 325 MG/1
650 TABLET ORAL EVERY 4 HOURS PRN
Status: DISCONTINUED | OUTPATIENT
Start: 2024-02-17 | End: 2024-02-19 | Stop reason: HOSPADM

## 2024-02-17 RX ORDER — IBUPROFEN 600 MG/1
600 TABLET ORAL EVERY 6 HOURS PRN
Status: DISCONTINUED | OUTPATIENT
Start: 2024-02-17 | End: 2024-02-19 | Stop reason: HOSPADM

## 2024-02-17 RX ORDER — LEVOFLOXACIN 5 MG/ML
750 INJECTION, SOLUTION INTRAVENOUS ONCE
Status: COMPLETED | OUTPATIENT
Start: 2024-02-17 | End: 2024-02-17

## 2024-02-17 RX ORDER — PROPRANOLOL HYDROCHLORIDE 20 MG/1
60 TABLET ORAL DAILY
Status: ON HOLD | COMMUNITY
End: 2024-02-19 | Stop reason: HOSPADM

## 2024-02-17 RX ORDER — HYDROXYZINE HYDROCHLORIDE 25 MG/1
25 TABLET, FILM COATED ORAL EVERY 8 HOURS PRN
Status: DISCONTINUED | OUTPATIENT
Start: 2024-02-17 | End: 2024-02-19 | Stop reason: HOSPADM

## 2024-02-17 RX ADMIN — SODIUM CHLORIDE 1000 ML: 9 INJECTION, SOLUTION INTRAVENOUS at 17:53

## 2024-02-17 RX ADMIN — VITAM B12 50 MCG: 100 TAB at 22:31

## 2024-02-17 RX ADMIN — LOSARTAN POTASSIUM 25 MG: 25 TABLET, FILM COATED ORAL at 22:31

## 2024-02-17 RX ADMIN — TEMAZEPAM 15 MG: 15 CAPSULE ORAL at 22:54

## 2024-02-17 RX ADMIN — FLUCONAZOLE 200 MG: 100 TABLET ORAL at 18:00

## 2024-02-17 RX ADMIN — IBUPROFEN 600 MG: 600 TABLET, FILM COATED ORAL at 22:54

## 2024-02-17 RX ADMIN — LEVOFLOXACIN 750 MG: 5 INJECTION, SOLUTION INTRAVENOUS at 17:55

## 2024-02-17 RX ADMIN — POTASSIUM CHLORIDE 40 MEQ: 1500 TABLET, EXTENDED RELEASE ORAL at 17:51

## 2024-02-17 ASSESSMENT — PAIN SCALES - GENERAL
PAINLEVEL_OUTOF10: 6
PAINLEVEL_OUTOF10: 6

## 2024-02-17 ASSESSMENT — PAIN DESCRIPTION - LOCATION
LOCATION: FLANK
LOCATION: BACK;HEAD

## 2024-02-17 ASSESSMENT — PAIN DESCRIPTION - ORIENTATION: ORIENTATION: LEFT

## 2024-02-17 ASSESSMENT — PAIN DESCRIPTION - PAIN TYPE: TYPE: ACUTE PAIN

## 2024-02-17 ASSESSMENT — PAIN - FUNCTIONAL ASSESSMENT: PAIN_FUNCTIONAL_ASSESSMENT: 0-10

## 2024-02-17 ASSESSMENT — PAIN DESCRIPTION - DESCRIPTORS: DESCRIPTORS: ACHING

## 2024-02-17 NOTE — ED PROVIDER NOTES
Scale  Eye Opening: Spontaneous  Best Verbal Response: Oriented  Best Motor Response: Obeys commands  Chayo Coma Scale Score: 15                     CIWA Assessment  BP: (!) 144/85  Pulse: 87                 PHYSICAL EXAM       ED Triage Vitals   BP Temp Temp src Pulse Resp SpO2 Height Weight   -- -- -- -- -- -- -- --       Physical Exam  Vitals and nursing note reviewed.   Constitutional:       General: She is not in acute distress.     Appearance: Normal appearance. She is obese. She is not ill-appearing or toxic-appearing.   HENT:      Head: Normocephalic and atraumatic.      Right Ear: External ear normal.      Left Ear: External ear normal.      Nose: Nose normal.      Mouth/Throat:      Mouth: Mucous membranes are moist.   Eyes:      General:         Right eye: No discharge.         Left eye: No discharge.      Conjunctiva/sclera: Conjunctivae normal.   Cardiovascular:      Rate and Rhythm: Normal rate and regular rhythm.      Pulses: Normal pulses.   Pulmonary:      Effort: Pulmonary effort is normal.      Breath sounds: Normal breath sounds.      Comments: Anterior posterior all lung sounds are clear.  Abdominal:      General: There is no distension.      Palpations: Abdomen is soft. There is no mass.      Tenderness: There is no abdominal tenderness. There is no right CVA tenderness, left CVA tenderness or guarding.      Hernia: No hernia is present.   Musculoskeletal:         General: No deformity or signs of injury.      Cervical back: Normal range of motion and neck supple.      Right lower leg: No edema.      Left lower leg: No edema.      Comments: Bilateral upper lower extremities 5 out of 5 motor strength without gross deformities or swelling.   Skin:     General: Skin is warm and dry.      Capillary Refill: Capillary refill takes less than 2 seconds.   Neurological:      General: No focal deficit present.      Mental Status: She is alert and oriented to person, place, and time. Mental status is at

## 2024-02-18 PROBLEM — R51.9 CHRONIC INTRACTABLE HEADACHE: Status: ACTIVE | Noted: 2024-02-18

## 2024-02-18 PROBLEM — N12 PYELONEPHRITIS: Status: ACTIVE | Noted: 2024-02-18

## 2024-02-18 PROBLEM — M54.50 CHRONIC MIDLINE LOW BACK PAIN WITHOUT SCIATICA: Status: ACTIVE | Noted: 2017-07-25

## 2024-02-18 PROBLEM — G89.29 CHRONIC INTRACTABLE HEADACHE: Status: ACTIVE | Noted: 2024-02-18

## 2024-02-18 PROBLEM — G43.E11 INTRACTABLE CHRONIC MIGRAINE WITH AURA WITH STATUS MIGRAINOSUS: Status: ACTIVE | Noted: 2023-03-30

## 2024-02-18 PROBLEM — I10 PRIMARY HYPERTENSION: Status: ACTIVE | Noted: 2024-02-18

## 2024-02-18 PROBLEM — M06.9 RHEUMATOID ARTHRITIS INVOLVING MULTIPLE SITES (HCC): Status: ACTIVE | Noted: 2024-02-18

## 2024-02-18 PROBLEM — R01.1 MURMUR, CARDIAC: Status: ACTIVE | Noted: 2024-02-18

## 2024-02-18 PROBLEM — R51.9 WORSENING HEADACHES: Status: ACTIVE | Noted: 2024-02-18

## 2024-02-18 LAB
ALBUMIN SERPL-MCNC: 3.9 G/DL (ref 3.5–5.2)
ALBUMIN/GLOB SERPL: 1.7 {RATIO} (ref 1–2.5)
ALP SERPL-CCNC: 87 U/L (ref 35–104)
ALT SERPL-CCNC: 11 U/L (ref 5–33)
ANION GAP SERPL CALCULATED.3IONS-SCNC: 9 MMOL/L (ref 9–17)
AST SERPL-CCNC: 10 U/L
BASOPHILS # BLD: 0 K/UL (ref 0–0.2)
BASOPHILS NFR BLD: 0 % (ref 0–2)
BILIRUB SERPL-MCNC: 0.2 MG/DL (ref 0.3–1.2)
BUN SERPL-MCNC: 18 MG/DL (ref 6–20)
CALCIUM SERPL-MCNC: 9.1 MG/DL (ref 8.6–10.4)
CHLORIDE SERPL-SCNC: 106 MMOL/L (ref 98–107)
CO2 SERPL-SCNC: 23 MMOL/L (ref 20–31)
CREAT SERPL-MCNC: 1.1 MG/DL (ref 0.5–0.9)
EOSINOPHIL # BLD: 0 K/UL (ref 0–0.4)
EOSINOPHILS RELATIVE PERCENT: 0 % (ref 1–4)
ERYTHROCYTE [DISTWIDTH] IN BLOOD BY AUTOMATED COUNT: 15.2 % (ref 12.5–15.4)
GFR SERPL CREATININE-BSD FRML MDRD: >60 ML/MIN/1.73M2
GLUCOSE SERPL-MCNC: 105 MG/DL (ref 70–99)
HCT VFR BLD AUTO: 36.9 % (ref 36–46)
HGB BLD-MCNC: 12.3 G/DL (ref 12–16)
LYMPHOCYTES NFR BLD: 2.6 K/UL (ref 1–4.8)
LYMPHOCYTES RELATIVE PERCENT: 23 % (ref 24–44)
MCH RBC QN AUTO: 27.6 PG (ref 26–34)
MCHC RBC AUTO-ENTMCNC: 33.3 G/DL (ref 31–37)
MCV RBC AUTO: 82.8 FL (ref 80–100)
MONOCYTES NFR BLD: 0.6 K/UL (ref 0.1–1.2)
MONOCYTES NFR BLD: 6 % (ref 2–11)
NEUTROPHILS NFR BLD: 71 % (ref 36–66)
NEUTS SEG NFR BLD: 7.8 K/UL (ref 1.8–7.7)
PLATELET # BLD AUTO: 238 K/UL (ref 140–450)
PMV BLD AUTO: 7.6 FL (ref 6–12)
POTASSIUM SERPL-SCNC: 4 MMOL/L (ref 3.7–5.3)
PROT SERPL-MCNC: 6.2 G/DL (ref 6.4–8.3)
RBC # BLD AUTO: 4.45 M/UL (ref 4–5.2)
SODIUM SERPL-SCNC: 138 MMOL/L (ref 135–144)
WBC OTHER # BLD: 11.1 K/UL (ref 3.5–11)

## 2024-02-18 PROCEDURE — 85025 COMPLETE CBC W/AUTO DIFF WBC: CPT

## 2024-02-18 PROCEDURE — 99223 1ST HOSP IP/OBS HIGH 75: CPT | Performed by: PSYCHIATRY & NEUROLOGY

## 2024-02-18 PROCEDURE — 6370000000 HC RX 637 (ALT 250 FOR IP): Performed by: INTERNAL MEDICINE

## 2024-02-18 PROCEDURE — 99223 1ST HOSP IP/OBS HIGH 75: CPT | Performed by: FAMILY MEDICINE

## 2024-02-18 PROCEDURE — 94760 N-INVAS EAR/PLS OXIMETRY 1: CPT

## 2024-02-18 PROCEDURE — 80053 COMPREHEN METABOLIC PANEL: CPT

## 2024-02-18 PROCEDURE — 1200000000 HC SEMI PRIVATE

## 2024-02-18 PROCEDURE — 36415 COLL VENOUS BLD VENIPUNCTURE: CPT

## 2024-02-18 PROCEDURE — 6370000000 HC RX 637 (ALT 250 FOR IP): Performed by: FAMILY MEDICINE

## 2024-02-18 PROCEDURE — 87086 URINE CULTURE/COLONY COUNT: CPT

## 2024-02-18 PROCEDURE — 99223 1ST HOSP IP/OBS HIGH 75: CPT | Performed by: INTERNAL MEDICINE

## 2024-02-18 RX ORDER — MAGNESIUM SULFATE 1 G/100ML
1000 INJECTION INTRAVENOUS ONCE
Status: DISCONTINUED | OUTPATIENT
Start: 2024-02-18 | End: 2024-02-19 | Stop reason: HOSPADM

## 2024-02-18 RX ORDER — LANOLIN ALCOHOL/MO/W.PET/CERES
400 CREAM (GRAM) TOPICAL NIGHTLY
Status: DISCONTINUED | OUTPATIENT
Start: 2024-02-18 | End: 2024-02-19 | Stop reason: HOSPADM

## 2024-02-18 RX ORDER — PROPRANOLOL HYDROCHLORIDE 80 MG/1
80 CAPSULE, EXTENDED RELEASE ORAL DAILY
Status: DISCONTINUED | OUTPATIENT
Start: 2024-02-18 | End: 2024-02-19 | Stop reason: HOSPADM

## 2024-02-18 RX ORDER — FLUCONAZOLE 100 MG/1
200 TABLET ORAL DAILY
Status: DISCONTINUED | OUTPATIENT
Start: 2024-02-18 | End: 2024-02-19 | Stop reason: HOSPADM

## 2024-02-18 RX ORDER — AMITRIPTYLINE HYDROCHLORIDE 25 MG/1
12.5 TABLET, FILM COATED ORAL NIGHTLY
Status: DISCONTINUED | OUTPATIENT
Start: 2024-02-18 | End: 2024-02-19 | Stop reason: HOSPADM

## 2024-02-18 RX ORDER — ERGOCALCIFEROL 1.25 MG/1
50000 CAPSULE ORAL ONCE
Status: DISCONTINUED | OUTPATIENT
Start: 2024-02-18 | End: 2024-02-19 | Stop reason: HOSPADM

## 2024-02-18 RX ORDER — CALCIUM CARBONATE-CHOLECALCIFEROL TAB 250 MG-125 UNIT 250-125 MG-UNIT
1 TAB ORAL DAILY
Status: DISCONTINUED | OUTPATIENT
Start: 2024-02-18 | End: 2024-02-19 | Stop reason: HOSPADM

## 2024-02-18 RX ORDER — LANOLIN ALCOHOL/MO/W.PET/CERES
3 CREAM (GRAM) TOPICAL NIGHTLY PRN
Status: DISCONTINUED | OUTPATIENT
Start: 2024-02-18 | End: 2024-02-19 | Stop reason: HOSPADM

## 2024-02-18 RX ADMIN — PROPRANOLOL HYDROCHLORIDE 80 MG: 80 CAPSULE, EXTENDED RELEASE ORAL at 12:00

## 2024-02-18 RX ADMIN — IBUPROFEN 600 MG: 600 TABLET, FILM COATED ORAL at 08:20

## 2024-02-18 RX ADMIN — DICLOFENAC SODIUM 2 G: 10 GEL TOPICAL at 08:19

## 2024-02-18 RX ADMIN — THERA TABS 1 TABLET: TAB at 08:19

## 2024-02-18 RX ADMIN — VITAM B12 50 MCG: 100 TAB at 08:20

## 2024-02-18 RX ADMIN — FLUCONAZOLE 200 MG: 100 TABLET ORAL at 12:00

## 2024-02-18 NOTE — VIRTUAL HEALTH
wound care: no    Chief complaint/reason for consultation:   Left renal abscess      History of Present Illness:   Lindsey Templeton is a 33 y.o.-year-old  female who was initially admitted on 2/17/2024. Patient seen at the request of .    INITIAL HISTORY:    Patient with a complex past medical history.  She carries a diagnosis of juvenile rheumatoid arthritis since the age of [age].  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  She also reports an episode of Henoch-Schoenlein purpura in 2014.  She follows with the rheumatology service at [facility]..  At the present time she is not receiving any biological agents, or immunosuppressive medications, although she has received them in the past.  Most recently she was a started on a trial of prednisone because of the ongoing fevers and joint aches.    She also suffers from chronic headaches and migraines for which she has been evaluated by multiple neurologists and at the [facility].    The patient suffers from kidney stones.  In November 2023 she required an admission for lithotripsy of a large kidney stone.    She also had a cystoscopy, Left ureteroscopy with holmium laser lithotripsy and Left ureteral stent placement on 1/16/2024  which she removed about 5 days after the initial placement.  The patient indicates that she noticed increased fatigue, intermittent headaches, intermittent nausea and fevers starting about a week after the removal of the stent.    The patient indicates the presence of night sweats for the past 4 to 6-month.  She has also experienced fatigue and low-grade fevers for a period of about 7 to 8-weeks.  An outpatient workup for the fevers initially did not yield any results except for an elevated CRP.  However her CT scan of the abdomen on 2/15/2024 has shown the presence of left kidney subcapsular fluid collection, presumably an abscess or hematoma.  The subcapsular fluid was not present

## 2024-02-18 NOTE — RT PROTOCOL NOTE
RT Inhaler-Nebulizer Bronchodilator Protocol Note    There is a bronchodilator order in the chart from a provider indicating to follow the RT Bronchodilator Protocol and there is an “Initiate RT Inhaler-Nebulizer Bronchodilator Protocol” order as well (see protocol at bottom of note).    CXR Findings:  No results found.    The findings from the last RT Protocol Assessment were as follows:   History Pulmonary Disease: None or smoker <15 pack years  Respiratory Pattern: Regular pattern and RR 12-20 bpm  Breath Sounds: Clear breath sounds  Cough: Strong, spontaneous, non-productive  Indication for Bronchodilator Therapy:    Bronchodilator Assessment Score: 0    Aerosolized bronchodilator medication orders have been revised according to the RT Inhaler-Nebulizer Bronchodilator Protocol below.    Respiratory Therapist to perform RT Therapy Protocol Assessment initially then follow the protocol.  Repeat RT Therapy Protocol Assessment PRN for score 0-3 or on second treatment, BID, and PRN for scores above 3.    No Indications - adjust the frequency to every 6 hours PRN wheezing or bronchospasm, if no treatments needed after 48 hours then discontinue using Per Protocol order mode.     If indication present, adjust the RT bronchodilator orders based on the Bronchodilator Assessment Score as indicated below.  Use Inhaler orders unless patient has one or more of the following: on home nebulizer, not able to hold breath for 10 seconds, is not alert and oriented, cannot activate and use MDI correctly, or respiratory rate 25 breaths per minute or more, then use the equivalent nebulizer order(s) with same Frequency and PRN reasons based on the score.  If a patient is on this medication at home then do not decrease Frequency below that used at home.    0-3 - enter or revise RT bronchodilator order(s) to equivalent RT Bronchodilator order with Frequency of every 4 hours PRN for wheezing or increased work of breathing using Per

## 2024-02-18 NOTE — H&P
[facility] Medicine   IN-PATIENT [facility] - Location: [place]    HISTORY AND PHYSICAL EXAMINATION            Date:   2/18/2024  Patient name:  Lindsey Templeton  Date of admission:  2/17/2024  4:32 PM  MRN:   [id number]  Account:  [id number]  YOB: 1990  PCP:    Thaddeus Wilcox MD  Room:   [address] [place] [address]  Code Status:    Full Code      History Obtained From:     patient    History of Present Illness:     Lindsey Templeton is a 33 y.o. Non- / non  female who presents with Abscess (Pt arrives with co left sided kidney abscess . Pt states she had stent jan 16tth dt kidney stone. Pt states she has had fevers for 3 weeks. Pt did out patient ct scan which showed abscess of kidney)   and is admitted to the hospital for the management of Renal abscess.    Patient was discharged, the ER yesterday by myself after being called by radiology tech with critical results of outpatient CT abdomen pelvis she had done 2/15 for workup of intermittent fevers and chills of unknown origin.  The CT abdomen pelvis indicated left renal abscess versus hematoma.  Patient had recent large obstructive kidney stone on the left which required stent placement and removal in January.  Within a week of patient removing the stent she started having an intermittent fevers and chills.  She was following with her PCP Dr. Wilcox and had workup done including labs and recent CT chest abdomen pelvis.   With finding of the kidney hematoma versus abscess she presented the ER and was admitted, labs on presentation significant for elevated white count 13.3 with increased absolute neutrophils, potassium slightly low at 3.5, glucose 175, creatinine 1.1 which appears stable for her, lactic acid 1.6.  Blood cultures obtained, urinalysis only trace leukocyte Estrace and culture pending.  She was given a dose of Levaquin IV x 1.  ID has been consulted as well as urology who ER spoke  and interventional radiology

## 2024-02-19 ENCOUNTER — TELEPHONE (OUTPATIENT)
Age: 34
End: 2024-02-19

## 2024-02-19 ENCOUNTER — APPOINTMENT (OUTPATIENT)
Dept: MRI IMAGING | Age: 34
DRG: 690 | End: 2024-02-19
Payer: COMMERCIAL

## 2024-02-19 ENCOUNTER — APPOINTMENT (OUTPATIENT)
Age: 34
DRG: 690 | End: 2024-02-19
Attending: FAMILY MEDICINE
Payer: COMMERCIAL

## 2024-02-19 VITALS
SYSTOLIC BLOOD PRESSURE: 126 MMHG | WEIGHT: 181 LBS | HEIGHT: 62 IN | TEMPERATURE: 98.2 F | RESPIRATION RATE: 18 BRPM | OXYGEN SATURATION: 96 % | DIASTOLIC BLOOD PRESSURE: 94 MMHG | HEART RATE: 70 BPM | BODY MASS INDEX: 33.31 KG/M2

## 2024-02-19 DIAGNOSIS — I10 PRIMARY HYPERTENSION: ICD-10-CM

## 2024-02-19 DIAGNOSIS — S37.012D HEMATOMA OF LEFT KIDNEY, SUBSEQUENT ENCOUNTER: Primary | ICD-10-CM

## 2024-02-19 PROBLEM — G43.E09 CHRONIC MIGRAINE WITH AURA WITHOUT STATUS MIGRAINOSUS, NOT INTRACTABLE: Status: ACTIVE | Noted: 2023-03-30

## 2024-02-19 LAB
ALBUMIN SERPL-MCNC: 4.2 G/DL (ref 3.5–5.2)
ALBUMIN/GLOB SERPL: 1.5 {RATIO} (ref 1–2.5)
ALP SERPL-CCNC: 93 U/L (ref 35–104)
ALT SERPL-CCNC: 18 U/L (ref 5–33)
ANION GAP SERPL CALCULATED.3IONS-SCNC: 10 MMOL/L (ref 9–17)
AST SERPL-CCNC: 14 U/L
BASOPHILS # BLD: 0.1 K/UL (ref 0–0.2)
BASOPHILS NFR BLD: 1 % (ref 0–2)
BILIRUB SERPL-MCNC: 0.4 MG/DL (ref 0.3–1.2)
BUN SERPL-MCNC: 17 MG/DL (ref 6–20)
CALCIUM SERPL-MCNC: 9.1 MG/DL (ref 8.6–10.4)
CHLORIDE SERPL-SCNC: 104 MMOL/L (ref 98–107)
CO2 SERPL-SCNC: 25 MMOL/L (ref 20–31)
CREAT SERPL-MCNC: 1.4 MG/DL (ref 0.5–0.9)
ECHO AO ROOT DIAM: 2.8 CM
ECHO AO ROOT INDEX: 1.53 CM/M2
ECHO AV MEAN GRADIENT: 5 MMHG
ECHO AV MEAN VELOCITY: 1.1 M/S
ECHO AV PEAK GRADIENT: 9 MMHG
ECHO AV PEAK VELOCITY: 1.5 M/S
ECHO AV VELOCITY RATIO: 0.53
ECHO AV VTI: 30.6 CM
ECHO BSA: 1.9 M2
ECHO IVC EXP: 2 CM
ECHO IVC INSP: 1 CM
ECHO LA AREA 2C: 12.3 CM2
ECHO LA AREA 4C: 10.7 CM2
ECHO LA DIAMETER INDEX: 1.86 CM/M2
ECHO LA DIAMETER: 3.4 CM
ECHO LA MAJOR AXIS: 4.1 CM
ECHO LA MINOR AXIS: 4 CM
ECHO LA TO AORTIC ROOT RATIO: 1.21
ECHO LA VOL BP: 26 ML (ref 22–52)
ECHO LA VOL MOD A2C: 30 ML (ref 22–52)
ECHO LA VOL MOD A4C: 22 ML (ref 22–52)
ECHO LA VOL/BSA BIPLANE: 14 ML/M2 (ref 16–34)
ECHO LA VOLUME INDEX MOD A2C: 16 ML/M2 (ref 16–34)
ECHO LA VOLUME INDEX MOD A4C: 12 ML/M2 (ref 16–34)
ECHO LV E' LATERAL VELOCITY: 9 CM/S
ECHO LV E' SEPTAL VELOCITY: 7 CM/S
ECHO LV FRACTIONAL SHORTENING: 33 % (ref 28–44)
ECHO LV INTERNAL DIMENSION DIASTOLE INDEX: 2.46 CM/M2
ECHO LV INTERNAL DIMENSION DIASTOLIC: 4.5 CM (ref 3.9–5.3)
ECHO LV INTERNAL DIMENSION SYSTOLIC INDEX: 1.64 CM/M2
ECHO LV INTERNAL DIMENSION SYSTOLIC: 3 CM
ECHO LV IVSD: 0.9 CM (ref 0.6–0.9)
ECHO LV MASS 2D: 132.8 G (ref 67–162)
ECHO LV MASS INDEX 2D: 72.6 G/M2 (ref 43–95)
ECHO LV POSTERIOR WALL DIASTOLIC: 0.9 CM (ref 0.6–0.9)
ECHO LV RELATIVE WALL THICKNESS RATIO: 0.4
ECHO LVOT AREA: 3.1 CM2
ECHO LVOT AV VTI INDEX: 0.56
ECHO LVOT DIAM: 2 CM
ECHO LVOT MEAN GRADIENT: 2 MMHG
ECHO LVOT PEAK GRADIENT: 3 MMHG
ECHO LVOT PEAK VELOCITY: 0.8 M/S
ECHO LVOT STROKE VOLUME INDEX: 29.2 ML/M2
ECHO LVOT SV: 53.4 ML
ECHO LVOT VTI: 17 CM
ECHO MV A VELOCITY: 0.46 M/S
ECHO MV E DECELERATION TIME (DT): 165 MS
ECHO MV E VELOCITY: 0.65 M/S
ECHO MV E/A RATIO: 1.41
ECHO MV E/E' LATERAL: 7.22
ECHO MV E/E' RATIO (AVERAGED): 8.25
ECHO RV BASAL DIMENSION: 2.9 CM
ECHO RV FREE WALL PEAK S': 10 CM/S
EOSINOPHIL # BLD: 0.1 K/UL (ref 0–0.4)
EOSINOPHILS RELATIVE PERCENT: 2 % (ref 1–4)
ERYTHROCYTE [DISTWIDTH] IN BLOOD BY AUTOMATED COUNT: 15.5 % (ref 12.5–15.4)
GFR SERPL CREATININE-BSD FRML MDRD: 51 ML/MIN/1.73M2
GLUCOSE SERPL-MCNC: 90 MG/DL (ref 70–99)
HCT VFR BLD AUTO: 43.1 % (ref 36–46)
HGB BLD-MCNC: 14.4 G/DL (ref 12–16)
LYMPHOCYTES NFR BLD: 3.4 K/UL (ref 1–4.8)
LYMPHOCYTES RELATIVE PERCENT: 38 % (ref 24–44)
MCH RBC QN AUTO: 27.5 PG (ref 26–34)
MCHC RBC AUTO-ENTMCNC: 33.3 G/DL (ref 31–37)
MCV RBC AUTO: 82.3 FL (ref 80–100)
MICROORGANISM SPEC CULT: NO GROWTH
MONOCYTES NFR BLD: 0.7 K/UL (ref 0.1–1.2)
MONOCYTES NFR BLD: 8 % (ref 2–11)
NEUTROPHILS NFR BLD: 51 % (ref 36–66)
NEUTS SEG NFR BLD: 4.6 K/UL (ref 1.8–7.7)
PLATELET # BLD AUTO: 244 K/UL (ref 140–450)
PMV BLD AUTO: 7.2 FL (ref 6–12)
POTASSIUM SERPL-SCNC: 4.3 MMOL/L (ref 3.7–5.3)
PROT SERPL-MCNC: 7 G/DL (ref 6.4–8.3)
RBC # BLD AUTO: 5.23 M/UL (ref 4–5.2)
SODIUM SERPL-SCNC: 139 MMOL/L (ref 135–144)
SPECIMEN DESCRIPTION: NORMAL
WBC OTHER # BLD: 9 K/UL (ref 3.5–11)

## 2024-02-19 PROCEDURE — 93306 TTE W/DOPPLER COMPLETE: CPT | Performed by: INTERNAL MEDICINE

## 2024-02-19 PROCEDURE — 93306 TTE W/DOPPLER COMPLETE: CPT

## 2024-02-19 PROCEDURE — 6370000000 HC RX 637 (ALT 250 FOR IP): Performed by: INTERNAL MEDICINE

## 2024-02-19 PROCEDURE — 70546 MR ANGIOGRAPH HEAD W/O&W/DYE: CPT

## 2024-02-19 PROCEDURE — 70553 MRI BRAIN STEM W/O & W/DYE: CPT

## 2024-02-19 PROCEDURE — 85025 COMPLETE CBC W/AUTO DIFF WBC: CPT

## 2024-02-19 PROCEDURE — 99232 SBSQ HOSP IP/OBS MODERATE 35: CPT | Performed by: PSYCHIATRY & NEUROLOGY

## 2024-02-19 PROCEDURE — 6370000000 HC RX 637 (ALT 250 FOR IP): Performed by: FAMILY MEDICINE

## 2024-02-19 PROCEDURE — A9579 GAD-BASE MR CONTRAST NOS,1ML: HCPCS | Performed by: PSYCHIATRY & NEUROLOGY

## 2024-02-19 PROCEDURE — 6360000004 HC RX CONTRAST MEDICATION: Performed by: PSYCHIATRY & NEUROLOGY

## 2024-02-19 PROCEDURE — 80053 COMPREHEN METABOLIC PANEL: CPT

## 2024-02-19 PROCEDURE — 2580000003 HC RX 258: Performed by: PSYCHIATRY & NEUROLOGY

## 2024-02-19 PROCEDURE — 36415 COLL VENOUS BLD VENIPUNCTURE: CPT

## 2024-02-19 RX ORDER — CHOLECALCIFEROL (VITAMIN D3) 125 MCG
1 CAPSULE ORAL DAILY
Qty: 30 CAPSULE | Refills: 2 | Status: SHIPPED | OUTPATIENT
Start: 2024-02-19

## 2024-02-19 RX ORDER — LEVOFLOXACIN 500 MG/1
500 TABLET, FILM COATED ORAL DAILY
Status: DISCONTINUED | OUTPATIENT
Start: 2024-02-19 | End: 2024-02-19 | Stop reason: HOSPADM

## 2024-02-19 RX ORDER — LEVOFLOXACIN 500 MG/1
500 TABLET, FILM COATED ORAL DAILY
Qty: 14 TABLET | Refills: 0 | Status: SHIPPED | OUTPATIENT
Start: 2024-02-19 | End: 2024-03-04

## 2024-02-19 RX ORDER — SODIUM CHLORIDE 0.9 % (FLUSH) 0.9 %
10 SYRINGE (ML) INJECTION ONCE
Status: COMPLETED | OUTPATIENT
Start: 2024-02-19 | End: 2024-02-19

## 2024-02-19 RX ORDER — HYDROXYZINE HYDROCHLORIDE 25 MG/1
25 TABLET, FILM COATED ORAL EVERY 8 HOURS PRN
Qty: 21 TABLET | Refills: 0 | Status: SHIPPED | OUTPATIENT
Start: 2024-02-19 | End: 2025-02-18

## 2024-02-19 RX ORDER — LANOLIN ALCOHOL/MO/W.PET/CERES
400 CREAM (GRAM) TOPICAL NIGHTLY
Qty: 30 TABLET | Refills: 3 | Status: SHIPPED | OUTPATIENT
Start: 2024-02-19

## 2024-02-19 RX ORDER — PROPRANOLOL HYDROCHLORIDE 120 MG/1
120 CAPSULE, EXTENDED RELEASE ORAL DAILY
Qty: 30 CAPSULE | Refills: 3 | Status: SHIPPED | OUTPATIENT
Start: 2024-02-20

## 2024-02-19 RX ORDER — FLUCONAZOLE 200 MG/1
200 TABLET ORAL DAILY
Qty: 3 TABLET | Refills: 0 | Status: SHIPPED | OUTPATIENT
Start: 2024-02-20 | End: 2024-02-23

## 2024-02-19 RX ORDER — DESVENLAFAXINE 25 MG/1
25 TABLET, EXTENDED RELEASE ORAL DAILY
Status: DISCONTINUED | OUTPATIENT
Start: 2024-02-19 | End: 2024-02-19 | Stop reason: HOSPADM

## 2024-02-19 RX ORDER — AMITRIPTYLINE HYDROCHLORIDE 25 MG/1
12.5 TABLET, FILM COATED ORAL NIGHTLY
Qty: 30 TABLET | Refills: 3 | Status: SHIPPED | OUTPATIENT
Start: 2024-02-19

## 2024-02-19 RX ADMIN — VITAM B12 50 MCG: 100 TAB at 10:22

## 2024-02-19 RX ADMIN — SODIUM CHLORIDE, PRESERVATIVE FREE 10 ML: 5 INJECTION INTRAVENOUS at 12:38

## 2024-02-19 RX ADMIN — PROPRANOLOL HYDROCHLORIDE 80 MG: 80 CAPSULE, EXTENDED RELEASE ORAL at 10:22

## 2024-02-19 RX ADMIN — LEVOFLOXACIN 500 MG: 500 TABLET, FILM COATED ORAL at 12:16

## 2024-02-19 RX ADMIN — Medication 1 TABLET: at 10:22

## 2024-02-19 RX ADMIN — DESVENLAFAXINE 25 MG: 25 TABLET, EXTENDED RELEASE ORAL at 12:16

## 2024-02-19 RX ADMIN — GADOTERIDOL 17 ML: 279.3 INJECTION, SOLUTION INTRAVENOUS at 12:37

## 2024-02-19 RX ADMIN — IBUPROFEN 600 MG: 600 TABLET, FILM COATED ORAL at 06:35

## 2024-02-19 RX ADMIN — THERA TABS 1 TABLET: TAB at 10:22

## 2024-02-19 RX ADMIN — ACETAMINOPHEN 650 MG: 325 TABLET ORAL at 06:35

## 2024-02-19 RX ADMIN — FLUCONAZOLE 200 MG: 100 TABLET ORAL at 10:22

## 2024-02-19 ASSESSMENT — PAIN SCALES - GENERAL
PAINLEVEL_OUTOF10: 7
PAINLEVEL_OUTOF10: 5

## 2024-02-19 ASSESSMENT — PAIN DESCRIPTION - DESCRIPTORS
DESCRIPTORS: ACHING
DESCRIPTORS: ACHING

## 2024-02-19 ASSESSMENT — PAIN DESCRIPTION - LOCATION
LOCATION: BACK;OTHER (COMMENT)
LOCATION: BACK

## 2024-02-19 NOTE — PROGRESS NOTES
Patient being discharged from [facility] today.  Order for BMP placed in chart to get BMP done a week after discharge and CT abdomen pelvis order placed for her to get done in 2 weeks for follow-up of left hematoma.  She is aware of these orders.  She needs follow-up appoint with ZD within 7 to 10 days

## 2024-02-19 NOTE — TELEPHONE ENCOUNTER
Care Transitions Initial Follow Up Call    Outreach made within 2 business days of discharge: Yes    Patient: Lindsey Templeton Patient : 1990   MRN: [id number]  Reason for Admission: There are no discharge diagnoses documented for the most recent discharge.  Discharge Date: 24       Spoke with: Lindsey    Discharge department/facility: [facility] Interactive Patient Contact:  Was patient able to fill all prescriptions: Yes  Was patient instructed to bring all medications to the follow-up visit: Yes  Is patient taking all medications as directed in the discharge summary? Yes  Does patient understand their discharge instructions: Yes  Does patient have questions or concerns that need addressed prior to 7-14 day follow up office visit: no    Scheduled appointment with PCP on 24 at 1 pm.     Follow Up  Future Appointments   Date Time Provider Department Center   2024  4:00 PM Thaddeus Wilcox MD [place] [facility]       Cheyanne Mckeon MA

## 2024-02-19 NOTE — PROGRESS NOTES
[facility] Medicine  IN-PATIENT SERVICE   [facility] - Location: [place]    Progress Note    2024    11:47 AM    Name:   Lindsey Templeton  MRN:     [id number]     Acct:      [id number]   Room:   314/314-01   Day:  2  Admit Date:  2024  4:32 PM    PCP:   Thaddeus Wilcox MD  Code Status:  Full Code    Subjective:     Patient without any new complaints.  Eating okay, no nausea vomiting, no cough, chest pain shortness of breath.  No trouble with urination.    Medications:     Allergies:    Allergies   Allergen Reactions    Xeljanz [Tofacitinib] Anaphylaxis and Other (See Comments)     gastritis    Ceclor [Cefaclor] Hives    Cephalosporins      hives    Documented on [facility] visit note.    Ciprofloxacin Hives    Bactrim [Sulfamethoxazole-Trimethoprim] Nausea And Vomiting       Current Meds:   Scheduled Meds:    desvenlafaxine succinate  25 mg Oral Daily    levoFLOXacin  500 mg Oral Daily    fluconazole  200 mg Oral Daily    propranolol  80 mg Oral Daily    magnesium oxide  400 mg Oral Nightly    vitamin D  50,000 Units Oral Once    calcium carb-cholecalciferol  1 tablet Oral Daily    magnesium sulfate  1,000 mg IntraVENous Once    amitriptyline  12.5 mg Oral Nightly    multivitamin  1 tablet Oral Daily    vitamin B-12  50 mcg Oral Daily     Continuous Infusions:   PRN Meds: melatonin, albuterol sulfate HFA, diclofenac sodium, hydrOXYzine HCl, ibuprofen, ondansetron, potassium chloride **OR** potassium alternative oral replacement **OR** potassium chloride, acetaminophen, temazepam    Data:     Vitals:  BP (!) 126/94   Pulse 70   Temp 98.2 °F (36.8 °C)   Resp 18   Ht 1.575 m (5' 2\")   Wt 82.1 kg (181 lb)   SpO2 96%   BMI 33.11 kg/m²   Temp (24hrs), Av.7 °F (37.1 °C), Min:97.9 °F (36.6 °C), Max:100.1 °F (37.8 °C)    No results for input(s): \"POCGLU\" in the last 72 hours.    I/O (24Hr):    Intake/Output Summary (Last 24 hours) at 2024 1147  Last data filed at 2024 
Discharge instructions reviewed with patient at the bedside. Pt verbalized understanding of instructions and need for follow up. Denies any concerns for discharge at this time. Peripheral IV removed per RN. Telemetry taken off. Pt dressed and packed belongings independently. Refused wheelchair. Discharged independently, driven home per her .   
NIVIA SPRING  [id number] on Oct 29 2011  1:02P  Electronically Signed by:  DR. KE SPRING M.D. on:  Oct 29 2011  3:20P            I personally reviewed all of the above medications, clinical laboratory, imaging and other diagnostic tests.         Impression:      Chronic migraine, in a patient with long history of migraines.  Current headache with her typical phenotype.  Has tried multiple prophylactic medications in the past.  Suspect component of superimposed cervicogenic and possible occipital neuralgia given tenderness in the left occipital region.    Plan:     MRI brain pending.  Will add MRV.  Started on Elavil 12.5 nightly, can be further titrated outpatient.  Propranolol can be also further optimized.  Can consider outpatient Botox.  Given current headache is improved, will defer further IV medications at this time.  Will need close follow-up with neurology outpatient. Was previously following at [facility].    We will continue to follow.    Electronically signed by Jed Reyes DO on 2/19/2024 at 9:42 AM      Jed Reyes DO  [facility]  Neurology

## 2024-02-19 NOTE — TELEPHONE ENCOUNTER
Dr. Wilcox called and states that another DrSamson Called and states that she has an abscess on her kidney that needs to be drained I was told to give her a call and let her know that she needs to go to the [facility] tired to reach patient phone went to voicemail and a message was left. I called her again and she states that she has been there for two days now. Dr. Miguel is there ion the room with her now.

## 2024-02-19 NOTE — DISCHARGE INSTRUCTIONS
Get lab draw (BMP) in one week at [facility] lab  Call to schedule Ct abdomen/pelvis in 7-10days

## 2024-02-19 NOTE — TELEPHONE ENCOUNTER
Patient is in [facility]. RN called and would like orders changed. Patient was put on effexor in the hospital. She brought presique from home and would like to continue that medication instead.

## 2024-02-19 NOTE — ED NOTES
Per urology. Plan to go home on oral Levaquin daily. Repeat follow up CT scan within 7-14 days and follow up in the office.     Patient does have allergy to Cipro. Patient does tolerate Levaquin.

## 2024-02-19 NOTE — TELEPHONE ENCOUNTER
mary Baca was unable to locate Dr. GLENNA Miguel on the [place] so she was given her cell phone. Doc is currently at the hospital rounding.

## 2024-02-19 NOTE — PLAN OF CARE
Problem: Discharge Planning  Goal: Discharge to home or other facility with appropriate resources  Outcome: Progressing  Flowsheets  Taken 2/17/2024 1906  Discharge to home or other facility with appropriate resources:   Identify barriers to discharge with patient and caregiver   Identify discharge learning needs (meds, wound care, etc)   Refer to discharge planning if patient needs post-hospital services based on physician order or complex needs related to functional status, cognitive ability or social support system   Arrange for needed discharge resources and transportation as appropriate  Taken 2/17/2024 1840  Discharge to home or other facility with appropriate resources:   Identify barriers to discharge with patient and caregiver   Arrange for needed discharge resources and transportation as appropriate   Identify discharge learning needs (meds, wound care, etc)   Refer to discharge planning if patient needs post-hospital services based on physician order or complex needs related to functional status, cognitive ability or social support system     Problem: Pain  Goal: Verbalizes/displays adequate comfort level or baseline comfort level  Outcome: Progressing     Problem: Safety - Adult  Goal: Free from fall injury  Outcome: Progressing     
  Problem: Discharge Planning  Goal: Discharge to home or other facility with appropriate resources  Outcome: Progressing  Flowsheets (Taken 2/18/2024 0828)  Discharge to home or other facility with appropriate resources:   Identify barriers to discharge with patient and caregiver   Arrange for needed discharge resources and transportation as appropriate   Identify discharge learning needs (meds, wound care, etc)   Refer to discharge planning if patient needs post-hospital services based on physician order or complex needs related to functional status, cognitive ability or social support system     Problem: Pain  Goal: Verbalizes/displays adequate comfort level or baseline comfort level  Outcome: Progressing  Flowsheets (Taken 2/18/2024 1151)  Verbalizes/displays adequate comfort level or baseline comfort level:   Encourage patient to monitor pain and request assistance   Assess pain using appropriate pain scale   Administer analgesics based on type and severity of pain and evaluate response   Implement non-pharmacological measures as appropriate and evaluate response   Notify Licensed Independent Practitioner if interventions unsuccessful or patient reports new pain     Problem: Safety - Adult  Goal: Free from fall injury  Outcome: Progressing     
Adult  Goal: Free from fall injury  2/18/2024 0433 by Shawn Mera, RN  Outcome: Progressing  2/17/2024 1915 by Sonal Sweet RN  Outcome: Progressing

## 2024-02-19 NOTE — CONSULTS
Paul Bolaños MD.  Urology Consultation    Patient:  Lindsey Templeton  MRN: [id number]  YOB: 1990    CHIEF COMPLAINT:  fever/flank pain    HISTORY OF PRESENT ILLNESS:     The patient is a 33 y.o. female who presents with fever/flank pain. Urology consulted for renal abscess    Hx of infected stone months ago  Stone has been treated  Pt presents with large hematoma and small abscess < 4 cm        Patient's old records reviewed. Pertinent patient notes and patient chart reviewed and summarized above.    Past Medical History:    Past Medical History:   Diagnosis Date    Asthma     childhood. Last use of inhaler 10/2023    Chronic sinus infection     Cluster headache     COVID-19     2 day fever. URI and sinus symptoms. 10/2023    Fibromyalgia     History of sepsis 2016    post C-Birth/partial    HSP (Henoch Schonlein purpura) (HCC)     Hypertension     Juvenile rheumatoid arthritis (HCC)     Dr. Goldberger (no longer sees) . Looking for new rheumatologist as of 2024    Post traumatic stress disorder (PTSD)     Uveitis     Wellness examination     as of 2024 looking for new pcp       Past Surgical History:    Past Surgical History:   Procedure Laterality Date    BACK INJECTION Bilateral 2021    SACROILIAC JOINT INJECTION performed by Jed De Luna MD at [facility] OR     SECTION  2016    CHOLECYSTECTOMY      CYSTOSCOPY Left 2023    : CYSTOSCOPY LEFT URETERAL STENT INSERTION    CYSTOSCOPY Left 2023    CYSTOSCOPY LEFT URETERAL STENT INSERTION performed by Darshan White MD at [facility] OR    CYSTOSCOPY Left 2024    HOLMIUM CYSTOSCOPY, URETEROSCOPY STENT EXCHANGE (Left)    EYE SURGERY Left     cataract with lens implant    HYSTERECTOMY (CERVIX STATUS UNKNOWN)  2016    partial    KNEE ARTHROSCOPY Right     X's 3 for meniscal tears    NERVE BLOCK Bilateral 2021     SACROILIAC JOINT INJECTION (Bilateral )    PAIN 
process to explain patient's left hemiparesis.  Mild enlargement of soft tissue at oropharynx, may be representing pharyngitis.  Multiple small and mildly enlarged lymph nodes in bilateral neck could be reactive.    CT head 8/3/2017: No acute intracranial abnormality no acute intracranial abnormality                 Impression and Plan: Ms. Lindsey Templeton is a 33 y.o. female with   Change in pattern of headaches; increasing frequency of headaches; chronic daily headaches superimposed on severe throbbing migrainous attacks; tried several prophylactic medications and has had consultations with several neurologists \"had seen 7 neurologists so far\".   Will get MRI brain (w/wo) to rule out cerebritis given the history of rheumatoid arthritis/uveitis, etc.  In further evaluation and also to start her on amitriptyline 12.5 nightly and also to administer IV mag.  As htn, not well controlled, she would benefit from optimization of propranolol dose to Inderal  qd.    Patient is trying to recall her prior prophylactic medications; will review those also and decide further approach accordingly.  Chronic low back pain; failed epidurals  Renal abscess versus hematoma: Eval/management as per primary team.    Care plan is discussed with the patient and her  at bedside.  Will follow with you. Thank you for consultation.       This note was partially created using voice recognition software and is inherently subject to errors including those of syntax and \"sound alike\" substitutions which may escape proofreading.  In such instances, original meaning may be extrapolated by contextual derivation.  Magi Emerson MD 2/18/2024 2:59 PM

## 2024-02-22 ENCOUNTER — TELEPHONE (OUTPATIENT)
Age: 34
End: 2024-02-22

## 2024-02-22 NOTE — TELEPHONE ENCOUNTER
[facility] called about the patient wondering if we had a secondary phone number for her. They have called her 3 times and left messages about calling them back to schedule an appt. I advised them she has an upcoming appt on Tuesday and we will speak with her then about it.

## 2024-02-26 ENCOUNTER — HOSPITAL ENCOUNTER (OUTPATIENT)
Age: 34
Discharge: HOME | End: 2024-02-26
Payer: COMMERCIAL

## 2024-02-26 DIAGNOSIS — I10 PRIMARY HYPERTENSION: ICD-10-CM

## 2024-02-26 LAB
ANION GAP SERPL CALCULATED.3IONS-SCNC: 8 MMOL/L (ref 9–17)
BUN SERPL-MCNC: 14 MG/DL (ref 6–20)
CALCIUM SERPL-MCNC: 8.9 MG/DL (ref 8.6–10.4)
CHLORIDE SERPL-SCNC: 107 MMOL/L (ref 98–107)
CO2 SERPL-SCNC: 24 MMOL/L (ref 20–31)
CREAT SERPL-MCNC: 1.1 MG/DL (ref 0.5–0.9)
GFR SERPL CREATININE-BSD FRML MDRD: >60 ML/MIN/1.73M2
GLUCOSE SERPL-MCNC: 88 MG/DL (ref 70–99)
POTASSIUM SERPL-SCNC: 4.1 MMOL/L (ref 3.7–5.3)
SODIUM SERPL-SCNC: 139 MMOL/L (ref 135–144)

## 2024-02-26 PROCEDURE — 36415 COLL VENOUS BLD VENIPUNCTURE: CPT

## 2024-02-26 PROCEDURE — 80048 BASIC METABOLIC PNL TOTAL CA: CPT

## 2024-03-01 ENCOUNTER — TELEPHONE (OUTPATIENT)
Age: 34
End: 2024-03-01

## 2024-03-01 DIAGNOSIS — N28.89 RENAL MASS: Primary | ICD-10-CM

## 2024-03-01 NOTE — TELEPHONE ENCOUNTER
Sophie  from [facility] called to get an order change.  She is scheduled on Tuesday 3/5/2024 for a CT.  Dr. VIDA Miguel ordered a CT abdomen pelvis w/o contrast.   The radiologist is asking for a new order for  CT Abd w and w/o contrast Renal mass protocol.  No oral needed just IV. It is to get a better look at her Kidney due to a Hematoma. Please call her at [phone]. They need this as soon as possible to be able to contact the insurance company.

## 2024-03-01 NOTE — TELEPHONE ENCOUNTER
See the order I just placed and call radiology at [facility] (tried calling the number given and it is \"disconnected\")

## 2024-03-05 ENCOUNTER — HOSPITAL ENCOUNTER (OUTPATIENT)
Age: 34
Setting detail: SPECIMEN
Discharge: HOME | End: 2024-03-05

## 2024-03-05 ENCOUNTER — HOSPITAL ENCOUNTER (OUTPATIENT)
Dept: CT IMAGING | Age: 34
Discharge: HOME | End: 2024-03-07
Attending: FAMILY MEDICINE
Payer: COMMERCIAL

## 2024-03-05 ENCOUNTER — OFFICE VISIT (OUTPATIENT)
Age: 34
End: 2024-03-05
Payer: COMMERCIAL

## 2024-03-05 VITALS
BODY MASS INDEX: 34.02 KG/M2 | DIASTOLIC BLOOD PRESSURE: 88 MMHG | HEART RATE: 78 BPM | OXYGEN SATURATION: 98 % | TEMPERATURE: 98.1 F | WEIGHT: 186 LBS | SYSTOLIC BLOOD PRESSURE: 134 MMHG

## 2024-03-05 DIAGNOSIS — N28.89 RENAL MASS: ICD-10-CM

## 2024-03-05 DIAGNOSIS — R50.9 FEVER OF UNKNOWN ORIGIN: Primary | ICD-10-CM

## 2024-03-05 DIAGNOSIS — R79.89 ELEVATED SERUM CREATININE: ICD-10-CM

## 2024-03-05 DIAGNOSIS — I10 PRIMARY HYPERTENSION: ICD-10-CM

## 2024-03-05 DIAGNOSIS — R53.83 OTHER FATIGUE: ICD-10-CM

## 2024-03-05 DIAGNOSIS — R50.9 FEVER OF UNKNOWN ORIGIN: ICD-10-CM

## 2024-03-05 LAB
ALBUMIN SERPL-MCNC: 4.2 G/DL (ref 3.5–5.2)
ALBUMIN/GLOB SERPL: 1.5 {RATIO} (ref 1–2.5)
ALP SERPL-CCNC: 110 U/L (ref 35–104)
ALT SERPL-CCNC: 15 U/L (ref 5–33)
ANION GAP SERPL CALCULATED.3IONS-SCNC: 9 MMOL/L (ref 9–17)
AST SERPL-CCNC: 15 U/L
BASOPHILS # BLD: <0.03 K/UL (ref 0–0.2)
BASOPHILS NFR BLD: 0 % (ref 0–2)
BILIRUB SERPL-MCNC: 0.3 MG/DL (ref 0.3–1.2)
BUN SERPL-MCNC: 11 MG/DL (ref 6–20)
CALCIUM SERPL-MCNC: 8.9 MG/DL (ref 8.6–10.4)
CHLORIDE SERPL-SCNC: 108 MMOL/L (ref 98–107)
CO2 SERPL-SCNC: 27 MMOL/L (ref 20–31)
CREAT SERPL-MCNC: 1.2 MG/DL (ref 0.5–0.9)
CREAT UR-MCNC: 48.7 MG/DL (ref 28–217)
EOSINOPHIL # BLD: 0.14 K/UL (ref 0–0.44)
EOSINOPHILS RELATIVE PERCENT: 2 % (ref 1–4)
ERYTHROCYTE [DISTWIDTH] IN BLOOD BY AUTOMATED COUNT: 14 % (ref 11.8–14.4)
GFR SERPL CREATININE-BSD FRML MDRD: >60 ML/MIN/1.73M2
GLUCOSE SERPL-MCNC: 90 MG/DL (ref 70–99)
HCT VFR BLD AUTO: 39.3 % (ref 36.3–47.1)
HGB BLD-MCNC: 12.8 G/DL (ref 11.9–15.1)
IMM GRANULOCYTES # BLD AUTO: <0.03 K/UL (ref 0–0.3)
IMM GRANULOCYTES NFR BLD: 0 %
LYMPHOCYTES NFR BLD: 1.79 K/UL (ref 1.1–3.7)
LYMPHOCYTES RELATIVE PERCENT: 25 % (ref 24–43)
MAGNESIUM SERPL-MCNC: 2.3 MG/DL (ref 1.6–2.6)
MCH RBC QN AUTO: 27.9 PG (ref 25.2–33.5)
MCHC RBC AUTO-ENTMCNC: 32.6 G/DL (ref 28.4–34.8)
MCV RBC AUTO: 85.8 FL (ref 82.6–102.9)
MICROALBUMIN UR-MCNC: <12 MG/L
MICROALBUMIN/CREAT UR-RTO: NORMAL MCG/MG CREAT
MONOCYTES NFR BLD: 0.37 K/UL (ref 0.1–1.2)
MONOCYTES NFR BLD: 5 % (ref 3–12)
NEUTROPHILS NFR BLD: 68 % (ref 36–65)
NEUTS SEG NFR BLD: 4.92 K/UL (ref 1.5–8.1)
NRBC BLD-RTO: 0 PER 100 WBC
PLATELET # BLD AUTO: 243 K/UL (ref 138–453)
PMV BLD AUTO: 10 FL (ref 8.1–13.5)
POTASSIUM SERPL-SCNC: 4.1 MMOL/L (ref 3.7–5.3)
PROT SERPL-MCNC: 7 G/DL (ref 6.4–8.3)
RBC # BLD AUTO: 4.58 M/UL (ref 3.95–5.11)
SODIUM SERPL-SCNC: 144 MMOL/L (ref 135–144)
WBC OTHER # BLD: 7.3 K/UL (ref 3.5–11.3)

## 2024-03-05 PROCEDURE — 2580000003 HC RX 258: Performed by: FAMILY MEDICINE

## 2024-03-05 PROCEDURE — 6360000004 HC RX CONTRAST MEDICATION: Performed by: FAMILY MEDICINE

## 2024-03-05 PROCEDURE — 3079F DIAST BP 80-89 MM HG: CPT | Performed by: STUDENT IN AN ORGANIZED HEALTH CARE EDUCATION/TRAINING PROGRAM

## 2024-03-05 PROCEDURE — 99214 OFFICE O/P EST MOD 30 MIN: CPT | Performed by: STUDENT IN AN ORGANIZED HEALTH CARE EDUCATION/TRAINING PROGRAM

## 2024-03-05 PROCEDURE — 3075F SYST BP GE 130 - 139MM HG: CPT | Performed by: STUDENT IN AN ORGANIZED HEALTH CARE EDUCATION/TRAINING PROGRAM

## 2024-03-05 PROCEDURE — 74178 CT ABD&PLV WO CNTR FLWD CNTR: CPT

## 2024-03-05 RX ORDER — 0.9 % SODIUM CHLORIDE 0.9 %
80 INTRAVENOUS SOLUTION INTRAVENOUS ONCE
Status: COMPLETED | OUTPATIENT
Start: 2024-03-05 | End: 2024-03-05

## 2024-03-05 RX ORDER — SODIUM CHLORIDE 0.9 % (FLUSH) 0.9 %
10 SYRINGE (ML) INJECTION PRN
Status: DISCONTINUED | OUTPATIENT
Start: 2024-03-05 | End: 2024-03-08 | Stop reason: HOSPADM

## 2024-03-05 RX ADMIN — IOPAMIDOL 100 ML: 755 INJECTION, SOLUTION INTRAVENOUS at 15:09

## 2024-03-05 RX ADMIN — SODIUM CHLORIDE, PRESERVATIVE FREE 10 ML: 5 INJECTION INTRAVENOUS at 15:09

## 2024-03-05 RX ADMIN — SODIUM CHLORIDE 80 ML: 9 INJECTION, SOLUTION INTRAVENOUS at 15:09

## 2024-03-05 ASSESSMENT — ENCOUNTER SYMPTOMS
SORE THROAT: 0
CHEST TIGHTNESS: 0
VOMITING: 0
SHORTNESS OF BREATH: 0
DIARRHEA: 0
CONSTIPATION: 0
NAUSEA: 0
RHINORRHEA: 0

## 2024-03-05 NOTE — PROGRESS NOTES
is to ensure resolution of this.  Patient tells me she still feels basically the same as she did before she went in.  Prior to the hospital stay she was having daily fevers but now she is getting fevers not daily but maybe every other day.  She still has only fevers by rectal temperature.    Noted that her creatinine was mildly elevated but over the last couple of years her baseline creatinine seems to be around 0.9-1.1.      In the hospital she was also seen by neurology and they started her on her new medication (Elavil) for her headaches and she said is actually helping improve the headache some.  She also had an MRI of the brain and MRA and it was negative.  Also discussed outpatient Botox with her.  She will follow-up with them as an outpatient.      CT Chest/ABD/Pelvis 2/15/2024:   IMPRESSION:  There is a 3.9 x 1.8 x 7.5 cm primarily sub capsule left renal fluid  collection with peripheral enhancement.  Although this could reflect hematoma  this more likely would represent abscess given clinical history of associated  fevers.  There is a perinephric component that extends superiorly.  Patchy  enhancement of the mid left kidney would suggest nephritis.  Overall size may  warrant image guided drainage.  Recommend follow-up imaging after treatment  to document resolution.        ECHO 2/19/2024:     Left Ventricle: Normal left ventricular systolic function with a visually estimated EF of 55 - 60%. Left ventricle size is normal. Normal wall thickness. Normal wall motion. Normal diastolic function.    Image quality is adequate.          REVIEW OF SYSTEM      Review of Systems   Constitutional:  Positive for fatigue and fever. Negative for chills.   HENT:  Negative for hearing loss, postnasal drip, rhinorrhea and sore throat.    Eyes:  Negative for visual disturbance.   Respiratory:  Negative for chest tightness and shortness of breath.    Cardiovascular:  Negative for chest pain and palpitations.   Gastrointestinal:

## 2024-03-26 ENCOUNTER — TELEPHONE (OUTPATIENT)
Age: 34
End: 2024-03-26

## 2024-03-26 NOTE — TELEPHONE ENCOUNTER
----- Message from Shawn uGerra MD sent at 3/19/2024 10:20 AM EDT -----  Regarding: RE: Nephrologist   Contact: [phone]  Yes, I will have my office reach out and set up an OV with the patient some time soon.  Gab Guerra M.D.   ----- Message -----  From: Thaddeus Wilcox MD  Sent: 3/18/2024   4:56 PM EDT  To: Shawn Guerra MD; #  Subject: Nephrologist                                     ----- Message from Thaddeus Wilcox MD sent at 3/18/2024  4:56 PM EDT -----       ----- Message from Lindsey Templeton to Thaddeus Wilcox MD sent at 3/18/2024 12:00 PM -----   Dr. Ng office on Friday said they were going to speak to physician to get me in sooner than June or July- I have not received a call back. - Could I get support with this, or maybe a new referral to someone who has availability. Thank you.       ----- Message -----       From:Lindsey Templeton       Sent:3/15/2024 11:44 AM EDT         To:Patient Medical Advice Request Message List    Subject:Nephrologist     Good morning,     I am currently on the phone with Dr. Ng office, he is booking patients out into July, his partner is in booking into June. I'm not sure what to do as I don't believe waiting that long is a good option. The woman I spoke with is going to speak to the physician and call me back. Is there another nephrologist that you would recommend if I can not get into Dr. Ng.    Also, insurance denied my last CT- are you able to help appeal that?      ----- Message -----       From:Lindsey Templeton       Sent:3/13/2024  8:48 AM EDT         To:Patient Medical Advice Request Message List    Subject:Nephrologist     Thank you!       ----- Message -----       From:GABRIEL GARCIA       Sent:3/13/2024  8:01 AM EDT         To:Lindsey Templeton    Subject:Nephrologist     Dr. Ng. Phone number is [phone]      ----- Message -----       From:Lindsey Templeton       Sent:3/11/2024  8:21 PM EDT         To:

## 2024-04-01 ENCOUNTER — TRANSCRIBE ORDERS (OUTPATIENT)
Dept: ADMINISTRATIVE | Age: 34
End: 2024-04-01

## 2024-04-01 DIAGNOSIS — N20.0 CALCULUS OF KIDNEY: Primary | ICD-10-CM

## 2024-04-05 ENCOUNTER — HOSPITAL ENCOUNTER (OUTPATIENT)
Dept: ULTRASOUND IMAGING | Age: 34
Discharge: HOME | End: 2024-04-05
Attending: UROLOGY
Payer: COMMERCIAL

## 2024-04-05 DIAGNOSIS — N20.0 CALCULUS OF KIDNEY: ICD-10-CM

## 2024-04-05 PROCEDURE — 76770 US EXAM ABDO BACK WALL COMP: CPT

## 2024-04-15 ENCOUNTER — OFFICE VISIT (OUTPATIENT)
Age: 34
End: 2024-04-15
Payer: COMMERCIAL

## 2024-04-15 VITALS
HEIGHT: 62 IN | RESPIRATION RATE: 16 BRPM | HEART RATE: 70 BPM | TEMPERATURE: 98.2 F | WEIGHT: 191 LBS | SYSTOLIC BLOOD PRESSURE: 130 MMHG | BODY MASS INDEX: 35.15 KG/M2 | OXYGEN SATURATION: 99 % | DIASTOLIC BLOOD PRESSURE: 78 MMHG

## 2024-04-15 DIAGNOSIS — M89.8X5 PAIN OF LEFT FEMUR: ICD-10-CM

## 2024-04-15 DIAGNOSIS — N15.1 ABSCESS OF LEFT KIDNEY: ICD-10-CM

## 2024-04-15 DIAGNOSIS — R61 NIGHT SWEATS: ICD-10-CM

## 2024-04-15 DIAGNOSIS — R50.9 FEVER OF UNKNOWN ORIGIN: Primary | ICD-10-CM

## 2024-04-15 PROCEDURE — 3078F DIAST BP <80 MM HG: CPT | Performed by: STUDENT IN AN ORGANIZED HEALTH CARE EDUCATION/TRAINING PROGRAM

## 2024-04-15 PROCEDURE — 3075F SYST BP GE 130 - 139MM HG: CPT | Performed by: STUDENT IN AN ORGANIZED HEALTH CARE EDUCATION/TRAINING PROGRAM

## 2024-04-15 PROCEDURE — 99213 OFFICE O/P EST LOW 20 MIN: CPT | Performed by: STUDENT IN AN ORGANIZED HEALTH CARE EDUCATION/TRAINING PROGRAM

## 2024-04-15 NOTE — PROGRESS NOTES
Date of Visit:  2024  Patient Name: Lindsey Templeton   Patient :  1990     CHIEF COMPLAINT/HPI:     Lindsey Templeton is a 34 y.o. female who presents today for an general visit to be evaluated for the following condition(s):  Chief Complaint   Patient presents with    Fever     Patient is here for 1 month follow up for ongoing fever, and kidney stone. States she is doing much better.      Patient is presenting for routine follow-up.  See previous notes for details.  She sees Rheum next week - she is still having some fevers and bruising and long bone pains - she will still be going forward with the bone marrow biopsy too. She sees Dr. Adler- she will be calling him for an appointment - she will be working with Rheum who is helping to coordinate this. She said likely she is starting on prednisone with her rheumatologist next week      HTN: on propranaolol 120mg daily. Doing well no issues. Discussed parameters.  Will consider coming off of this in the future if her blood pressure starts to improve      Urology - 3 month follow up. She had a renal abscess and kidney stone - she completed about 6 weeks Levaquin and this has cleared the abscess. Renal US shows the abscess is resolved.  However as noted above she still getting some unknown fevers.     Renal US 2024:   IMPRESSION:  1. Echogenic debris in the dependent urinary bladder.  Recommend correlation  clinical findings and urinalysis.  2. Area of concern on previous CT left kidney not demonstrated on this exam.  3. Otherwise unremarkable ultrasound of the kidneys and urinary bladder.      Overall patient is feeling stable compared to last time to still getting the fevers and bone pain as noted above.    REVIEW OF SYSTEM      Review of Systems   Constitutional:  Positive for fever. Negative for chills.        See HPI   HENT:  Negative for hearing loss, postnasal drip, rhinorrhea and sore throat.    Eyes:  Negative for visual

## 2024-04-16 RX ORDER — AMITRIPTYLINE HYDROCHLORIDE 25 MG/1
12.5 TABLET, FILM COATED ORAL NIGHTLY
Qty: 30 TABLET | Refills: 3 | Status: SHIPPED | OUTPATIENT
Start: 2024-04-16

## 2024-04-16 ASSESSMENT — ENCOUNTER SYMPTOMS
CHEST TIGHTNESS: 0
DIARRHEA: 0
RHINORRHEA: 0
SHORTNESS OF BREATH: 0
VOMITING: 0
SORE THROAT: 0
CONSTIPATION: 0
NAUSEA: 0

## 2024-04-30 ENCOUNTER — TRANSCRIBE ORDERS (OUTPATIENT)
Dept: ADMINISTRATIVE | Age: 34
End: 2024-04-30

## 2024-04-30 DIAGNOSIS — R50.9 FEVER, UNSPECIFIED: Primary | ICD-10-CM

## 2024-04-30 DIAGNOSIS — R79.9 ABNORMAL BLOOD CHEMISTRY: ICD-10-CM

## 2024-05-29 ENCOUNTER — HOSPITAL ENCOUNTER (OUTPATIENT)
Dept: CT IMAGING | Age: 34
Discharge: HOME | End: 2024-05-31
Attending: INTERNAL MEDICINE
Payer: COMMERCIAL

## 2024-05-29 ENCOUNTER — PATIENT MESSAGE (OUTPATIENT)
Age: 34
End: 2024-05-29

## 2024-05-29 DIAGNOSIS — R50.9 FEVER, UNSPECIFIED: ICD-10-CM

## 2024-05-29 DIAGNOSIS — R79.9 ABNORMAL BLOOD CHEMISTRY: ICD-10-CM

## 2024-05-29 LAB
CREAT SERPL-MCNC: 1 MG/DL (ref 0.5–0.9)
GFR, ESTIMATED: 75 ML/MIN/1.73M2

## 2024-05-29 PROCEDURE — 36415 COLL VENOUS BLD VENIPUNCTURE: CPT

## 2024-05-29 PROCEDURE — 2580000003 HC RX 258: Performed by: INTERNAL MEDICINE

## 2024-05-29 PROCEDURE — 82565 ASSAY OF CREATININE: CPT

## 2024-05-29 PROCEDURE — 74177 CT ABD & PELVIS W/CONTRAST: CPT

## 2024-05-29 PROCEDURE — 6360000004 HC RX CONTRAST MEDICATION: Performed by: INTERNAL MEDICINE

## 2024-05-29 RX ORDER — 0.9 % SODIUM CHLORIDE 0.9 %
80 INTRAVENOUS SOLUTION INTRAVENOUS ONCE
Status: COMPLETED | OUTPATIENT
Start: 2024-05-29 | End: 2024-05-29

## 2024-05-29 RX ORDER — SODIUM CHLORIDE 0.9 % (FLUSH) 0.9 %
10 SYRINGE (ML) INJECTION PRN
Status: DISCONTINUED | OUTPATIENT
Start: 2024-05-29 | End: 2024-06-01 | Stop reason: HOSPADM

## 2024-05-29 RX ADMIN — SODIUM CHLORIDE 80 ML: 9 INJECTION, SOLUTION INTRAVENOUS at 15:27

## 2024-05-29 RX ADMIN — IOPAMIDOL 75 ML: 755 INJECTION, SOLUTION INTRAVENOUS at 15:26

## 2024-05-29 RX ADMIN — SODIUM CHLORIDE, PRESERVATIVE FREE 10 ML: 5 INJECTION INTRAVENOUS at 15:26

## 2024-05-29 NOTE — TELEPHONE ENCOUNTER
She is feeling about the same as she has been.  No symptoms or symptoms.  I did speak with Nayely about this over the phone and he will call infectious disease tomorrow at the appointment and we discussed what antibiotic to use for her.  She does not need to go to the ER at this point since she has not had any worsening symptoms.  Warning signs discussed about when to go to the ER and she is agreeable.  She is also going to follow-up with her rheumatologist to possibly start treatment for stills disease.

## 2024-05-29 NOTE — TELEPHONE ENCOUNTER
From: Lindsey Templeton  To: Dr. Thaddeus Wilcox  Sent: 5/29/2024 5:51 PM EDT  Subject: CT    I received results of CT- suspicious of kidney infection again- I have yet to see bone marrow results in my chart or [facility] patient portal, but I have appointment with oncologist tomorrow.     What should I do, last time I was sent to the hospital.

## 2024-07-11 ENCOUNTER — HOSPITAL ENCOUNTER (OUTPATIENT)
Age: 34
Discharge: HOME | End: 2024-07-11
Payer: COMMERCIAL

## 2024-07-11 LAB
BACTERIA URNS QL MICRO: NORMAL
BILIRUB UR QL STRIP: NEGATIVE
CASTS #/AREA URNS LPF: NORMAL /LPF (ref 0–8)
CLARITY UR: CLEAR
COLOR UR: YELLOW
EPI CELLS #/AREA URNS HPF: NORMAL /HPF (ref 0–5)
GLUCOSE UR STRIP-MCNC: NEGATIVE MG/DL
HGB UR QL STRIP.AUTO: NEGATIVE
KETONES UR STRIP-MCNC: NEGATIVE MG/DL
LEUKOCYTE ESTERASE UR QL STRIP: NEGATIVE
NITRITE UR QL STRIP: NEGATIVE
PH UR STRIP: 6.5 [PH] (ref 5–8)
PROT UR STRIP-MCNC: ABNORMAL MG/DL
RBC #/AREA URNS HPF: NORMAL /HPF (ref 0–4)
SP GR UR STRIP: 1.02 (ref 1–1.03)
UROBILINOGEN UR STRIP-ACNC: NORMAL EU/DL (ref 0–1)
WBC #/AREA URNS HPF: NORMAL /HPF (ref 0–5)

## 2024-07-11 PROCEDURE — 81001 URINALYSIS AUTO W/SCOPE: CPT

## 2024-07-15 ENCOUNTER — OFFICE VISIT (OUTPATIENT)
Age: 34
End: 2024-07-15
Payer: COMMERCIAL

## 2024-07-15 VITALS
TEMPERATURE: 97.9 F | DIASTOLIC BLOOD PRESSURE: 86 MMHG | HEART RATE: 98 BPM | BODY MASS INDEX: 35.44 KG/M2 | HEIGHT: 62 IN | SYSTOLIC BLOOD PRESSURE: 122 MMHG | OXYGEN SATURATION: 96 % | WEIGHT: 192.6 LBS

## 2024-07-15 DIAGNOSIS — I10 PRIMARY HYPERTENSION: ICD-10-CM

## 2024-07-15 DIAGNOSIS — E55.9 VITAMIN D DEFICIENCY: ICD-10-CM

## 2024-07-15 DIAGNOSIS — M06.1 ADULT-ONSET STILL'S DISEASE (HCC): Primary | ICD-10-CM

## 2024-07-15 DIAGNOSIS — R94.4 DECREASED GFR: ICD-10-CM

## 2024-07-15 PROCEDURE — 99214 OFFICE O/P EST MOD 30 MIN: CPT | Performed by: STUDENT IN AN ORGANIZED HEALTH CARE EDUCATION/TRAINING PROGRAM

## 2024-07-15 PROCEDURE — 3074F SYST BP LT 130 MM HG: CPT | Performed by: STUDENT IN AN ORGANIZED HEALTH CARE EDUCATION/TRAINING PROGRAM

## 2024-07-15 PROCEDURE — 3079F DIAST BP 80-89 MM HG: CPT | Performed by: STUDENT IN AN ORGANIZED HEALTH CARE EDUCATION/TRAINING PROGRAM

## 2024-07-15 RX ORDER — COLCHICINE 0.6 MG/1
0.6 TABLET ORAL 2 TIMES DAILY
COMMUNITY
Start: 2024-06-12

## 2024-07-15 RX ORDER — PREDNISONE 20 MG/1
20 TABLET ORAL DAILY
COMMUNITY

## 2024-07-15 RX ORDER — AMLODIPINE BESYLATE 2.5 MG/1
2.5 TABLET ORAL DAILY
Qty: 90 TABLET | Refills: 0 | Status: SHIPPED | OUTPATIENT
Start: 2024-07-15

## 2024-07-15 RX ORDER — METHOTREXATE 2.5 MG/1
6 TABLET ORAL WEEKLY
COMMUNITY
Start: 2024-07-06

## 2024-07-15 RX ORDER — CYCLOBENZAPRINE HCL 5 MG
5 TABLET ORAL 3 TIMES DAILY PRN
COMMUNITY
Start: 2024-06-24

## 2024-07-15 ASSESSMENT — ENCOUNTER SYMPTOMS
CONSTIPATION: 0
DIARRHEA: 0
CHEST TIGHTNESS: 0
SHORTNESS OF BREATH: 0
SORE THROAT: 0
VOMITING: 0
NAUSEA: 0
RHINORRHEA: 0

## 2024-07-15 NOTE — PROGRESS NOTES
Date of Visit:  2024  Patient Name: Lindsey Templeton   Patient :  1990     CHIEF COMPLAINT/HPI:     Lindsey Templeton is a 34 y.o. female who presents today for an general visit to be evaluated for the following condition(s):  Chief Complaint   Patient presents with    Hypertension     She is here for her 3 month check up without labs.   She was diagnosis ed with AOSD.      Patient is here for routine follow-up.    She was diagnosed with Adult Onset Still's Disease.     CKD - she has generally had some good readings for her Cr and eGFR. She then had some labs checked about 2 weeks ago. She does     Her last eGFR was 75 and now it is 53. Her Cr was 1.0 and now it is 1.33. This was in . I will recheck this now.     She was evaluated by ID and she has been cleared to not need any more antibiotics now.     She is planning to go to [facility] and get a second opinion from Rheum out there.       ////    HTN: she uses propranolol 120mg daily for this. It has been well controlled. She checks the BP TID at home. Usually running around 150/90. Spikes with lack of food and heat. She is still taking prednisone and this obviously make the BP elevated. HR is usually in the 90s.   BP still spiking up at times - uncontrolled - I will recommend amlodipine 2.5mg daily if not controlled in 2 weeks then inc to 5mg daily. She is agreeable. Risk and benefit discussed.       Vitamin D deficiecny - doing well on her vit D tabs.     PTSD - doing well on her meds and with her counseling.      She takes B12 for energy.           REVIEW OF SYSTEM      Review of Systems   Constitutional:  Negative for chills and fever.   HENT:  Negative for hearing loss, postnasal drip, rhinorrhea and sore throat.    Eyes:  Negative for visual disturbance.   Respiratory:  Negative for chest tightness and shortness of breath.    Cardiovascular:  Negative for chest pain and palpitations.   Gastrointestinal:  Negative for

## 2024-07-15 NOTE — PATIENT INSTRUCTIONS
Lindsey    Thank you for choosing [facility].  We know you have options when it comes to your healthcare; we appreciate that you chose us. Our goal is to provide exceptional  service and world class care to every patient.  You will be receiving a survey via email or text message asking for your feedback.  Please take a few minutes to share your thoughts about your recent visit. Your comments help us understand what we do well and ways we can improve.  Thank you in advance for your valuable feedback.      Dr. DUSTIN Lynch

## 2024-07-17 ENCOUNTER — HOSPITAL ENCOUNTER (OUTPATIENT)
Age: 34
Discharge: HOME | End: 2024-07-17
Payer: COMMERCIAL

## 2024-07-17 DIAGNOSIS — I10 PRIMARY HYPERTENSION: ICD-10-CM

## 2024-07-17 DIAGNOSIS — E55.9 VITAMIN D DEFICIENCY: ICD-10-CM

## 2024-07-17 DIAGNOSIS — R94.4 DECREASED GFR: ICD-10-CM

## 2024-07-17 DIAGNOSIS — M06.1 ADULT-ONSET STILL'S DISEASE (HCC): ICD-10-CM

## 2024-07-17 LAB
25(OH)D3 SERPL-MCNC: 51.7 NG/ML (ref 30–100)
ALBUMIN SERPL-MCNC: 4.8 G/DL (ref 3.5–5.2)
ALBUMIN/GLOB SERPL: 2 {RATIO} (ref 1–2.5)
ALP SERPL-CCNC: 70 U/L (ref 35–104)
ALT SERPL-CCNC: 25 U/L (ref 10–35)
ANION GAP SERPL CALCULATED.3IONS-SCNC: 8 MMOL/L (ref 9–16)
AST SERPL-CCNC: 18 U/L (ref 10–35)
BASOPHILS # BLD: 0.04 K/UL (ref 0–0.2)
BASOPHILS NFR BLD: 0 % (ref 0–2)
BILIRUB SERPL-MCNC: 0.3 MG/DL (ref 0–1.2)
BUN SERPL-MCNC: 16 MG/DL (ref 6–20)
CALCIUM SERPL-MCNC: 10.7 MG/DL (ref 8.6–10.4)
CHLORIDE SERPL-SCNC: 102 MMOL/L (ref 98–107)
CO2 SERPL-SCNC: 28 MMOL/L (ref 20–31)
CREAT SERPL-MCNC: 1.1 MG/DL (ref 0.5–0.9)
CREAT UR-MCNC: 26.7 MG/DL (ref 28–217)
EOSINOPHIL # BLD: 0.04 K/UL (ref 0–0.44)
EOSINOPHILS RELATIVE PERCENT: 0 % (ref 1–4)
ERYTHROCYTE [DISTWIDTH] IN BLOOD BY AUTOMATED COUNT: 14.1 % (ref 11.8–14.4)
GFR, ESTIMATED: 64 ML/MIN/1.73M2
GLUCOSE SERPL-MCNC: 121 MG/DL (ref 74–99)
HCT VFR BLD AUTO: 42.8 % (ref 36.3–47.1)
HGB BLD-MCNC: 14.2 G/DL (ref 11.9–15.1)
IMM GRANULOCYTES # BLD AUTO: 0.08 K/UL (ref 0–0.3)
IMM GRANULOCYTES NFR BLD: 1 %
LYMPHOCYTES NFR BLD: 1.36 K/UL (ref 1.1–3.7)
LYMPHOCYTES RELATIVE PERCENT: 12 % (ref 24–43)
MCH RBC QN AUTO: 29.4 PG (ref 25.2–33.5)
MCHC RBC AUTO-ENTMCNC: 33.2 G/DL (ref 28.4–34.8)
MCV RBC AUTO: 88.6 FL (ref 82.6–102.9)
MICROALBUMIN UR-MCNC: <12 MG/L (ref 0–20)
MICROALBUMIN/CREAT UR-RTO: ABNORMAL MCG/MG CREAT (ref 0–25)
MONOCYTES NFR BLD: 0.26 K/UL (ref 0.1–1.2)
MONOCYTES NFR BLD: 2 % (ref 3–12)
NEUTROPHILS NFR BLD: 85 % (ref 36–65)
NEUTS SEG NFR BLD: 9.75 K/UL (ref 1.5–8.1)
NRBC BLD-RTO: 0 PER 100 WBC
PLATELET # BLD AUTO: 252 K/UL (ref 138–453)
PMV BLD AUTO: 9.3 FL (ref 8.1–13.5)
POTASSIUM SERPL-SCNC: 4.5 MMOL/L (ref 3.7–5.3)
PROT SERPL-MCNC: 7 G/DL (ref 6.6–8.7)
RBC # BLD AUTO: 4.83 M/UL (ref 3.95–5.11)
SODIUM SERPL-SCNC: 138 MMOL/L (ref 136–145)
WBC OTHER # BLD: 11.5 K/UL (ref 3.5–11.3)

## 2024-07-17 PROCEDURE — 36415 COLL VENOUS BLD VENIPUNCTURE: CPT

## 2024-07-17 PROCEDURE — 82306 VITAMIN D 25 HYDROXY: CPT

## 2024-07-17 PROCEDURE — 82570 ASSAY OF URINE CREATININE: CPT

## 2024-07-17 PROCEDURE — 80053 COMPREHEN METABOLIC PANEL: CPT

## 2024-07-17 PROCEDURE — 82043 UR ALBUMIN QUANTITATIVE: CPT

## 2024-07-17 PROCEDURE — 85025 COMPLETE CBC W/AUTO DIFF WBC: CPT

## 2024-07-22 ENCOUNTER — HOSPITAL ENCOUNTER (OUTPATIENT)
Age: 34
Discharge: HOME | End: 2024-07-22
Payer: COMMERCIAL

## 2024-07-22 DIAGNOSIS — I10 PRIMARY HYPERTENSION: ICD-10-CM

## 2024-07-22 LAB
ALBUMIN SERPL-MCNC: 4.6 G/DL (ref 3.5–5.2)
ALBUMIN/GLOB SERPL: 2 {RATIO} (ref 1–2.5)
ALP SERPL-CCNC: 62 U/L (ref 35–104)
ALT SERPL-CCNC: 21 U/L (ref 10–35)
ANION GAP SERPL CALCULATED.3IONS-SCNC: 10 MMOL/L (ref 9–16)
AST SERPL-CCNC: 13 U/L (ref 10–35)
BILIRUB SERPL-MCNC: 0.2 MG/DL (ref 0–1.2)
BUN SERPL-MCNC: 27 MG/DL (ref 6–20)
CALCIUM SERPL-MCNC: 10 MG/DL (ref 8.6–10.4)
CHLORIDE SERPL-SCNC: 103 MMOL/L (ref 98–107)
CO2 SERPL-SCNC: 26 MMOL/L (ref 20–31)
CREAT SERPL-MCNC: 1.2 MG/DL (ref 0.5–0.9)
GFR, ESTIMATED: 61 ML/MIN/1.73M2
GLUCOSE SERPL-MCNC: 83 MG/DL (ref 74–99)
POTASSIUM SERPL-SCNC: 3.9 MMOL/L (ref 3.7–5.3)
PROT SERPL-MCNC: 6.7 G/DL (ref 6.6–8.7)
SODIUM SERPL-SCNC: 139 MMOL/L (ref 136–145)

## 2024-07-22 PROCEDURE — 36415 COLL VENOUS BLD VENIPUNCTURE: CPT

## 2024-07-22 PROCEDURE — 80053 COMPREHEN METABOLIC PANEL: CPT

## 2024-07-29 ENCOUNTER — HOSPITAL ENCOUNTER (OUTPATIENT)
Age: 34
Discharge: HOME | End: 2024-07-29
Payer: COMMERCIAL

## 2024-07-29 DIAGNOSIS — N18.2 CKD (CHRONIC KIDNEY DISEASE), STAGE II: ICD-10-CM

## 2024-07-29 LAB
ANION GAP SERPL CALCULATED.3IONS-SCNC: 12 MMOL/L (ref 9–16)
BACTERIA URNS QL MICRO: ABNORMAL
BILIRUB UR QL STRIP: NEGATIVE
BUN SERPL-MCNC: 18 MG/DL (ref 6–20)
C3 SERPL-MCNC: 151 MG/DL (ref 90–180)
C4 SERPL-MCNC: 24 MG/DL (ref 10–40)
CA-I BLD-SCNC: 1.3 MMOL/L (ref 1.13–1.33)
CALCIUM SERPL-MCNC: 9.7 MG/DL (ref 8.6–10.4)
CASTS #/AREA URNS LPF: ABNORMAL /LPF (ref 0–8)
CHLORIDE SERPL-SCNC: 103 MMOL/L (ref 98–107)
CLARITY UR: CLEAR
CO2 SERPL-SCNC: 25 MMOL/L (ref 20–31)
COLOR UR: ABNORMAL
CREAT SERPL-MCNC: 1.3 MG/DL (ref 0.5–0.9)
CREAT UR-MCNC: 158 MG/DL (ref 28–217)
EPI CELLS #/AREA URNS HPF: ABNORMAL /HPF (ref 0–5)
ERYTHROCYTE [SEDIMENTATION RATE] IN BLOOD BY PHOTOMETRIC METHOD: 2 MM/HR (ref 0–20)
GFR, ESTIMATED: 58 ML/MIN/1.73M2
GLUCOSE SERPL-MCNC: 112 MG/DL (ref 74–99)
GLUCOSE UR STRIP-MCNC: NEGATIVE MG/DL
HGB UR QL STRIP.AUTO: NEGATIVE
KETONES UR STRIP-MCNC: NEGATIVE MG/DL
LEUKOCYTE ESTERASE UR QL STRIP: NEGATIVE
NITRITE UR QL STRIP: NEGATIVE
PH UR STRIP: 6 [PH] (ref 5–8)
PHOSPHATE SERPL-MCNC: 2.4 MG/DL (ref 2.5–4.5)
POTASSIUM SERPL-SCNC: 4.4 MMOL/L (ref 3.7–5.3)
PROT UR STRIP-MCNC: ABNORMAL MG/DL
PTH-INTACT SERPL-MCNC: 28 PG/ML (ref 15–65)
RBC #/AREA URNS HPF: ABNORMAL /HPF (ref 0–4)
SODIUM SERPL-SCNC: 140 MMOL/L (ref 136–145)
SP GR UR STRIP: 1.02 (ref 1–1.03)
TOTAL PROTEIN, URINE: 24 MG/DL
UROBILINOGEN UR STRIP-ACNC: NORMAL EU/DL (ref 0–1)
WBC #/AREA URNS HPF: ABNORMAL /HPF (ref 0–5)

## 2024-07-29 PROCEDURE — 80048 BASIC METABOLIC PNL TOTAL CA: CPT

## 2024-07-29 PROCEDURE — 36415 COLL VENOUS BLD VENIPUNCTURE: CPT

## 2024-07-29 PROCEDURE — 83970 ASSAY OF PARATHORMONE: CPT

## 2024-07-29 PROCEDURE — 81001 URINALYSIS AUTO W/SCOPE: CPT

## 2024-07-29 PROCEDURE — 84100 ASSAY OF PHOSPHORUS: CPT

## 2024-07-29 PROCEDURE — 82570 ASSAY OF URINE CREATININE: CPT

## 2024-07-29 PROCEDURE — 82330 ASSAY OF CALCIUM: CPT

## 2024-07-29 PROCEDURE — 84156 ASSAY OF PROTEIN URINE: CPT

## 2024-07-29 PROCEDURE — 86160 COMPLEMENT ANTIGEN: CPT

## 2024-07-29 PROCEDURE — 85652 RBC SED RATE AUTOMATED: CPT

## 2024-08-01 RX ORDER — PROPRANOLOL HYDROCHLORIDE 120 MG/1
120 CAPSULE, EXTENDED RELEASE ORAL DAILY
Qty: 30 CAPSULE | Refills: 3 | Status: SHIPPED | OUTPATIENT
Start: 2024-08-01

## 2024-08-06 ENCOUNTER — PATIENT MESSAGE (OUTPATIENT)
Age: 34
End: 2024-08-06

## 2024-08-06 DIAGNOSIS — K62.5 BRBPR (BRIGHT RED BLOOD PER RECTUM): ICD-10-CM

## 2024-08-06 DIAGNOSIS — K92.1 HEMATOCHEZIA: Primary | ICD-10-CM

## 2024-08-06 NOTE — TELEPHONE ENCOUNTER
From: Lindsey Templeton  To: Dr. Thaddeus Wilcox  Sent: 8/6/2024 9:03 AM EDT  Subject: Blood in BM    Hello,    I had a very small bm this morning, blood was in toilet and a small dark clot when I wiped. Should I be concerned? I emailed rheumatologist as well I have a telehealth today at 4.     I’ve had stomach pain for a couple days and diarrhea, headache sore throat and muscle pains I thought I had a sinus infection now this…     Is it safe to go to work?

## 2024-08-06 NOTE — TELEPHONE ENCOUNTER
Does she have symptoms like lightheadedness or dizziness? Severe abdominal pain - then go to the ER   This is likely from the diarrhea irritating her anal area and potential hemorrhoids. - she can try some preparation H daily. - if she still has diarrhea she can try imodium OTC.   I will get some basic labs to check the blood levels.   I will refer her to GI.   If it keeps happening or if she gets worse symptoms let us know. It is fine to go to work if she does a non-physically demanding job.

## 2024-08-07 ENCOUNTER — HOSPITAL ENCOUNTER (OUTPATIENT)
Age: 34
Discharge: HOME | End: 2024-08-07
Payer: COMMERCIAL

## 2024-08-07 DIAGNOSIS — K92.1 HEMATOCHEZIA: ICD-10-CM

## 2024-08-07 DIAGNOSIS — K62.5 BRBPR (BRIGHT RED BLOOD PER RECTUM): ICD-10-CM

## 2024-08-07 LAB
ALBUMIN SERPL-MCNC: 4.4 G/DL (ref 3.5–5.2)
ALBUMIN/GLOB SERPL: 2 {RATIO} (ref 1–2.5)
ALP SERPL-CCNC: 59 U/L (ref 35–104)
ALT SERPL-CCNC: 27 U/L (ref 10–35)
ANION GAP SERPL CALCULATED.3IONS-SCNC: 10 MMOL/L (ref 9–16)
AST SERPL-CCNC: 24 U/L (ref 10–35)
BASOPHILS # BLD: 0.03 K/UL (ref 0–0.2)
BASOPHILS NFR BLD: 1 % (ref 0–2)
BILIRUB SERPL-MCNC: 0.4 MG/DL (ref 0–1.2)
BUN SERPL-MCNC: 16 MG/DL (ref 6–20)
CALCIUM SERPL-MCNC: 9.2 MG/DL (ref 8.6–10.4)
CHLORIDE SERPL-SCNC: 102 MMOL/L (ref 98–107)
CO2 SERPL-SCNC: 28 MMOL/L (ref 20–31)
CREAT SERPL-MCNC: 1.2 MG/DL (ref 0.5–0.9)
EOSINOPHIL # BLD: 0.11 K/UL (ref 0–0.44)
EOSINOPHILS RELATIVE PERCENT: 2 % (ref 1–4)
ERYTHROCYTE [DISTWIDTH] IN BLOOD BY AUTOMATED COUNT: 14.6 % (ref 11.8–14.4)
GFR, ESTIMATED: 59 ML/MIN/1.73M2
GLUCOSE SERPL-MCNC: 108 MG/DL (ref 74–99)
HCT VFR BLD AUTO: 40.5 % (ref 36.3–47.1)
HGB BLD-MCNC: 13.3 G/DL (ref 11.9–15.1)
IMM GRANULOCYTES # BLD AUTO: 0.03 K/UL (ref 0–0.3)
IMM GRANULOCYTES NFR BLD: 1 %
LYMPHOCYTES NFR BLD: 1.54 K/UL (ref 1.1–3.7)
LYMPHOCYTES RELATIVE PERCENT: 26 % (ref 24–43)
MCH RBC QN AUTO: 30 PG (ref 25.2–33.5)
MCHC RBC AUTO-ENTMCNC: 32.8 G/DL (ref 28.4–34.8)
MCV RBC AUTO: 91.2 FL (ref 82.6–102.9)
MONOCYTES NFR BLD: 0.47 K/UL (ref 0.1–1.2)
MONOCYTES NFR BLD: 8 % (ref 3–12)
NEUTROPHILS NFR BLD: 62 % (ref 36–65)
NEUTS SEG NFR BLD: 3.7 K/UL (ref 1.5–8.1)
NRBC BLD-RTO: 0 PER 100 WBC
PLATELET # BLD AUTO: 198 K/UL (ref 138–453)
PMV BLD AUTO: 9 FL (ref 8.1–13.5)
POTASSIUM SERPL-SCNC: 3.8 MMOL/L (ref 3.7–5.3)
PROT SERPL-MCNC: 6.7 G/DL (ref 6.6–8.7)
RBC # BLD AUTO: 4.44 M/UL (ref 3.95–5.11)
RBC # BLD: ABNORMAL 10*6/UL
SODIUM SERPL-SCNC: 140 MMOL/L (ref 136–145)
WBC OTHER # BLD: 5.9 K/UL (ref 3.5–11.3)

## 2024-08-07 PROCEDURE — 80053 COMPREHEN METABOLIC PANEL: CPT

## 2024-08-07 PROCEDURE — 85025 COMPLETE CBC W/AUTO DIFF WBC: CPT

## 2024-08-07 PROCEDURE — 36415 COLL VENOUS BLD VENIPUNCTURE: CPT

## 2024-08-07 NOTE — TELEPHONE ENCOUNTER
With all of the symptoms of having pretty high blood pressure and shortness of breath and dizziness it might be reasonable to go back into the ER to get checked.    The hemoglobin levels look good and the kidney numbers look good but I do not like the sound of all the symptoms that you are having.  I only did a couple of preliminary blood tests but it could be worthwhile getting a scan of the belly and making sure nothing else was going on.

## 2024-08-08 NOTE — TELEPHONE ENCOUNTER
I do not see any ER notes in the system.   Please let patient know that I am actually going to be out of the office for the next week.  My partners are covering for me for questions or concerns.   It looks like she was getting referred to GI for CT?  Please follow-up on this to be sure that she got the right care that she needed and my partners are also able to order things if needed..  Thank you

## 2024-08-13 NOTE — TELEPHONE ENCOUNTER
Lindsey Templeton is calling to request a refill on the following medication(s):    Medication Request:  Requested Prescriptions     Pending Prescriptions Disp Refills    propranolol (INDERAL LA) 120 MG extended release capsule [Pharmacy Med Name: PROPRANOLOL HCL ER CAPS 120MG] 90 capsule 0     Sig: Take 1 capsule by mouth daily       Last Visit Date (If Applicable):  7/15/2024    Next Visit Date:    10/17/2024

## 2024-08-13 NOTE — TELEPHONE ENCOUNTER
Lindsey Templeton is calling to request a refill on the following medication(s):    Medication Request:  Requested Prescriptions     Pending Prescriptions Disp Refills    amLODIPine (NORVASC) 2.5 MG tablet [Pharmacy Med Name: AMLODIPINE BESYLATE TABS 2.5MG] 90 tablet 0     Sig: Take 1 tablet by mouth daily       Last Visit Date (If Applicable):  7/15/2024    Next Visit Date:    10/17/2024

## 2024-08-15 RX ORDER — PROPRANOLOL HYDROCHLORIDE 120 MG/1
120 CAPSULE, EXTENDED RELEASE ORAL DAILY
Qty: 90 CAPSULE | Refills: 0 | Status: SHIPPED | OUTPATIENT
Start: 2024-08-15

## 2024-08-15 RX ORDER — AMLODIPINE BESYLATE 2.5 MG/1
2.5 TABLET ORAL DAILY
Qty: 90 TABLET | Refills: 0 | Status: SHIPPED | OUTPATIENT
Start: 2024-08-15

## 2024-10-17 ENCOUNTER — OFFICE VISIT (OUTPATIENT)
Age: 34
End: 2024-10-17
Payer: COMMERCIAL

## 2024-10-17 VITALS
WEIGHT: 198.2 LBS | OXYGEN SATURATION: 97 % | HEIGHT: 62 IN | HEART RATE: 99 BPM | SYSTOLIC BLOOD PRESSURE: 122 MMHG | TEMPERATURE: 98.2 F | DIASTOLIC BLOOD PRESSURE: 84 MMHG | BODY MASS INDEX: 36.47 KG/M2

## 2024-10-17 DIAGNOSIS — I10 PRIMARY HYPERTENSION: ICD-10-CM

## 2024-10-17 DIAGNOSIS — R07.9 CHEST PAIN, UNSPECIFIED TYPE: Primary | ICD-10-CM

## 2024-10-17 DIAGNOSIS — M06.1 ADULT-ONSET STILL'S DISEASE (HCC): ICD-10-CM

## 2024-10-17 PROCEDURE — 3074F SYST BP LT 130 MM HG: CPT | Performed by: STUDENT IN AN ORGANIZED HEALTH CARE EDUCATION/TRAINING PROGRAM

## 2024-10-17 PROCEDURE — 99213 OFFICE O/P EST LOW 20 MIN: CPT | Performed by: STUDENT IN AN ORGANIZED HEALTH CARE EDUCATION/TRAINING PROGRAM

## 2024-10-17 PROCEDURE — 3079F DIAST BP 80-89 MM HG: CPT | Performed by: STUDENT IN AN ORGANIZED HEALTH CARE EDUCATION/TRAINING PROGRAM

## 2024-10-17 PROCEDURE — 93000 ELECTROCARDIOGRAM COMPLETE: CPT | Performed by: STUDENT IN AN ORGANIZED HEALTH CARE EDUCATION/TRAINING PROGRAM

## 2024-10-17 RX ORDER — CYCLOBENZAPRINE HCL 5 MG
5 TABLET ORAL DAILY
Qty: 30 TABLET | Refills: 2 | Status: SHIPPED | OUTPATIENT
Start: 2024-10-17 | End: 2025-01-15

## 2024-10-17 ASSESSMENT — ENCOUNTER SYMPTOMS
SHORTNESS OF BREATH: 0
NAUSEA: 0
RHINORRHEA: 0
CHEST TIGHTNESS: 0
DIARRHEA: 0
VOMITING: 0
SORE THROAT: 0
CONSTIPATION: 0

## 2024-10-17 NOTE — PROGRESS NOTES
Date of Visit:  10/17/2024  Patient Name: Lindsey Templeton   Patient :  1990     CHIEF COMPLAINT/HPI:     Lindsey Templeton is a 34 y.o. female who presents today for an general visit to be evaluated for the following condition(s):  Chief Complaint   Patient presents with    3 Month Follow-Up     Here for 3 month check up with some updates.     Patient is dealing with adult stills disease. She now sees Dr. Kristie Schroeder who specializes in Adults Stills disease.     She was having some chest pains that were radiating down her left am and having a lot of back pains and she got adjusted at the chiropracter and she said she has a lot of back issues but she is working with it.     Chest pain for a few weeks.  She did not want to go to the ER but she did end up going to a chiropractor and had some adjustments.  See above.  The symptoms seem to wax and wane.    She has an echo ordered from her rheumatologist and she is following up with them on Tuesday.    She said so far the labs from [facility] have been looking essentially normal.  She showed me a few on her phone and she is going to get them sent over to me formally.    She is not having classic signs of ACS and does not want to go to the ER right now.    Hypertension is well-controlled.  No issues with her medications.      REVIEW OF SYSTEM      Review of Systems   Constitutional:  Negative for chills and fever.   HENT:  Negative for hearing loss, postnasal drip, rhinorrhea and sore throat.    Eyes:  Negative for visual disturbance.   Respiratory:  Negative for chest tightness and shortness of breath.    Cardiovascular:  Positive for chest pain. Negative for palpitations.   Gastrointestinal:  Negative for constipation, diarrhea, nausea and vomiting.   Genitourinary:  Negative for dysuria.   Musculoskeletal:  Negative for arthralgias.   Skin:  Negative for rash.   Neurological:  Negative for dizziness, weakness, light-headedness, numbness and

## 2024-11-27 NOTE — OP NOTE
Addended by: GRETA PATEL on: 11/27/2024 04:00 PM     Modules accepted: Orders     Sacro-Iliac Joint Injection:  SURGEON: Jed De Luna    PRE-OP DIAGNOSIS: Sacroiliitis (M46.1), Low back pain (M54.5)    POST-OP DIAGNOSIS: Same. Procedure performed: Bilateral Sacroiliac Joint Injection. Physician confirmed and marked the surgical site. EBL: minimal    CONSENT: Patient has undergone the educational process with this procedure, is aware and fully understands the risks involved: potential damage to any and all body organs including possible bleeding, infection, and nerve injury, allergic reaction and headache. Patient also understands that the procedure will be undertaken in a safe, controlled and monitored setting. Patient recognizes that the benefits may include relief from pain and reduction in the oral use of medications. Patient agreed to proceed. The patient was counseled at length about the risks of colette Covid-19 during their perioperative period and any recovery window from their procedure. The patient was made aware that colette Covid-19  may worsen their prognosis for recovering from their procedure  and lend to a higher morbidity and/or mortality risk. All material risks, benefits, and reasonable alternatives including postponing the procedure were discussed. The patient does wish to proceed with the procedure at this time. PREP: The patient's back was prepped with chloroprep and draped appropriately. 5ml of 0.5% lidocaine was used to anesthetize the skin and subcutaneous tissue. PROCEDURE NOTE: A 22 gauge 3.5 inch spinal needle was advanced to the  Bilateral SI joint under fluoroscopic guidance. Aspiration was negative for blood, CSF and producing pain. 2ml of  0.25% bupivacaine was mixed with 5mg Dexamethasone and slowly injected into the joint(s). The needle was withdrawn by the physician and the nurse applied a sterile dressing. The patient tolerated the procedure well. No complications occurred. Patient transferred to the recovery room in satisfactory condition. Appropriate written discharge instructions given to the patient.       [address] [place] [address]

## 2024-12-17 ENCOUNTER — OFFICE VISIT (OUTPATIENT)
Dept: BARIATRICS/WEIGHT MGMT | Age: 34
End: 2024-12-17
Payer: COMMERCIAL

## 2024-12-17 VITALS
OXYGEN SATURATION: 97 % | HEART RATE: 92 BPM | SYSTOLIC BLOOD PRESSURE: 128 MMHG | WEIGHT: 194 LBS | BODY MASS INDEX: 35.7 KG/M2 | DIASTOLIC BLOOD PRESSURE: 88 MMHG | HEIGHT: 62 IN

## 2024-12-17 DIAGNOSIS — E66.1 CLASS 2 DRUG-INDUCED OBESITY WITH SERIOUS COMORBIDITY AND BODY MASS INDEX (BMI) OF 35.0 TO 35.9 IN ADULT: ICD-10-CM

## 2024-12-17 DIAGNOSIS — I10 PRIMARY HYPERTENSION: Primary | ICD-10-CM

## 2024-12-17 DIAGNOSIS — E66.812 CLASS 2 DRUG-INDUCED OBESITY WITH SERIOUS COMORBIDITY AND BODY MASS INDEX (BMI) OF 35.0 TO 35.9 IN ADULT: ICD-10-CM

## 2024-12-17 PROBLEM — R51.9 CHRONIC DAILY HEADACHE: Status: RESOLVED | Noted: 2023-03-30 | Resolved: 2024-12-17

## 2024-12-17 PROBLEM — N13.5 URETEROVESICAL JUNCTION (UVJ) OBSTRUCTION: Status: RESOLVED | Noted: 2023-11-26 | Resolved: 2024-12-17

## 2024-12-17 PROBLEM — R51.9 WORSENING HEADACHES: Status: RESOLVED | Noted: 2024-02-18 | Resolved: 2024-12-17

## 2024-12-17 PROBLEM — N15.1 ABSCESS OF LEFT KIDNEY: Status: RESOLVED | Noted: 2024-02-17 | Resolved: 2024-12-17

## 2024-12-17 PROBLEM — N12 PYELONEPHRITIS: Status: RESOLVED | Noted: 2024-02-18 | Resolved: 2024-12-17

## 2024-12-17 PROBLEM — E78.5 HYPERLIPIDEMIA: Status: RESOLVED | Noted: 2022-12-05 | Resolved: 2024-12-17

## 2024-12-17 PROBLEM — N17.9 ACUTE KIDNEY INJURY (HCC): Status: RESOLVED | Noted: 2023-11-27 | Resolved: 2024-12-17

## 2024-12-17 PROBLEM — R73.9 HYPERGLYCEMIA: Status: RESOLVED | Noted: 2017-06-15 | Resolved: 2024-12-17

## 2024-12-17 PROBLEM — R51.9 CHRONIC INTRACTABLE HEADACHE: Status: RESOLVED | Noted: 2024-02-18 | Resolved: 2024-12-17

## 2024-12-17 PROBLEM — G89.29 CHRONIC INTRACTABLE HEADACHE: Status: RESOLVED | Noted: 2024-02-18 | Resolved: 2024-12-17

## 2024-12-17 PROBLEM — R03.0 ELEVATED BLOOD PRESSURE READING: Status: RESOLVED | Noted: 2023-03-30 | Resolved: 2024-12-17

## 2024-12-17 PROCEDURE — 99215 OFFICE O/P EST HI 40 MIN: CPT | Performed by: NURSE PRACTITIONER

## 2024-12-17 PROCEDURE — 3079F DIAST BP 80-89 MM HG: CPT | Performed by: NURSE PRACTITIONER

## 2024-12-17 PROCEDURE — 3074F SYST BP LT 130 MM HG: CPT | Performed by: NURSE PRACTITIONER

## 2024-12-17 RX ORDER — PHENTERMINE HYDROCHLORIDE 37.5 MG/1
37.5 TABLET ORAL
Qty: 30 TABLET | Refills: 0 | Status: SHIPPED | OUTPATIENT
Start: 2024-12-17 | End: 2025-01-16

## 2024-12-17 NOTE — PROGRESS NOTES
252  138 - 453 k/uL Final    MPV 07/17/2024 9.3  8.1 - 13.5 fL Final    NRBC Automated 07/17/2024 0.0  0.0 per 100 WBC Final    Neutrophils % 07/17/2024 85 (H)  36 - 65 % Final    Lymphocytes % 07/17/2024 12 (L)  24 - 43 % Final    Monocytes % 07/17/2024 2 (L)  3 - 12 % Final    Eosinophils % 07/17/2024 0 (L)  1 - 4 % Final    Basophils % 07/17/2024 0  0 - 2 % Final    Immature Granulocytes % 07/17/2024 1 (H)  0 % Final    Neutrophils Absolute 07/17/2024 9.75 (H)  1.50 - 8.10 k/uL Final    Lymphocytes Absolute 07/17/2024 1.36  1.10 - 3.70 k/uL Final    Monocytes Absolute 07/17/2024 0.26  0.10 - 1.20 k/uL Final    Eosinophils Absolute 07/17/2024 0.04  0.00 - 0.44 k/uL Final    Basophils Absolute 07/17/2024 0.04  0.00 - 0.20 k/uL Final    Immature Granulocytes Absolute 07/17/2024 0.08  0.00 - 0.30 k/uL Final    Sodium 07/17/2024 138  136 - 145 mmol/L Final    Potassium 07/17/2024 4.5  3.7 - 5.3 mmol/L Final    Chloride 07/17/2024 102  98 - 107 mmol/L Final    CO2 07/17/2024 28  20 - 31 mmol/L Final    Anion Gap 07/17/2024 8 (L)  9 - 16 mmol/L Final    Glucose 07/17/2024 121 (H)  74 - 99 mg/dL Final    BUN 07/17/2024 16  6 - 20 mg/dL Final    Creatinine 07/17/2024 1.1 (H)  0.50 - 0.90 mg/dL Final    Est, Glom Filt Rate 07/17/2024 64  >60 mL/min/1.73m2 Final    Comment:       These results are not intended for use in patients <18 years of age.        eGFR results are calculated without a race factor using the 2021 CKD-EPI equation.  Careful clinical correlation is recommended, particularly when comparing to results   calculated using previous equations.  The CKD-EPI equation is less accurate in patients with extremes of muscle mass, extra-renal   metabolism of creatine, excessive creatine ingestion, or following therapy that affects   renal tubular secretion.      Calcium 07/17/2024 10.7 (H)  8.6 - 10.4 mg/dL Final    Total Protein 07/17/2024 7.0  6.6 - 8.7 g/dL Final    Albumin 07/17/2024 4.8  3.5 - 5.2 g/dL Final

## 2025-01-09 ENCOUNTER — HOSPITAL ENCOUNTER (OUTPATIENT)
Age: 35
Discharge: HOME | End: 2025-01-09
Payer: COMMERCIAL

## 2025-01-09 DIAGNOSIS — M54.50 ACUTE LOW BACK PAIN WITHOUT SCIATICA, UNSPECIFIED BACK PAIN LATERALITY: ICD-10-CM

## 2025-01-09 PROCEDURE — 87077 CULTURE AEROBIC IDENTIFY: CPT

## 2025-01-09 PROCEDURE — 87086 URINE CULTURE/COLONY COUNT: CPT

## 2025-01-09 PROCEDURE — 87186 SC STD MICRODIL/AGAR DIL: CPT

## 2025-01-11 LAB
MICROORGANISM SPEC CULT: ABNORMAL
SERVICE CMNT-IMP: ABNORMAL
SPECIMEN DESCRIPTION: ABNORMAL

## 2025-04-04 ENCOUNTER — APPOINTMENT (OUTPATIENT)
Dept: GENETICS | Facility: CLINIC | Age: 35
End: 2025-04-04
Payer: COMMERCIAL

## 2025-04-04 VITALS
TEMPERATURE: 98.5 F | HEIGHT: 62 IN | HEART RATE: 106 BPM | RESPIRATION RATE: 18 BRPM | SYSTOLIC BLOOD PRESSURE: 126 MMHG | WEIGHT: 189 LBS | DIASTOLIC BLOOD PRESSURE: 89 MMHG | BODY MASS INDEX: 34.78 KG/M2

## 2025-04-04 DIAGNOSIS — M04.1 PERIODIC FEVER (MULTI): ICD-10-CM

## 2025-04-04 DIAGNOSIS — S37.69XD RUPTURE OF UTERUS, SUBSEQUENT ENCOUNTER: Primary | ICD-10-CM

## 2025-04-04 RX ORDER — DICLOFENAC SODIUM 10 MG/G
2 GEL TOPICAL
COMMUNITY
Start: 2025-02-05

## 2025-04-04 RX ORDER — METHYLPREDNISOLONE 4 MG/1
TABLET ORAL
COMMUNITY
Start: 2025-03-10

## 2025-04-04 RX ORDER — HYDROXYCHLOROQUINE SULFATE 200 MG/1
2 TABLET, FILM COATED ORAL
COMMUNITY
Start: 2025-01-29

## 2025-04-04 RX ORDER — AMLODIPINE BESYLATE 2.5 MG/1
1 TABLET ORAL DAILY
COMMUNITY
Start: 2024-08-15

## 2025-04-04 RX ORDER — COLCHICINE 0.6 MG/1
TABLET ORAL
COMMUNITY

## 2025-04-04 RX ORDER — PROPRANOLOL HYDROCHLORIDE 120 MG/1
1 CAPSULE, EXTENDED RELEASE ORAL DAILY
COMMUNITY
Start: 2024-08-15

## 2025-04-04 ASSESSMENT — PATIENT HEALTH QUESTIONNAIRE - PHQ9
SUM OF ALL RESPONSES TO PHQ9 QUESTIONS 1 & 2: 0
1. LITTLE INTEREST OR PLEASURE IN DOING THINGS: NOT AT ALL
2. FEELING DOWN, DEPRESSED OR HOPELESS: NOT AT ALL

## 2025-04-04 NOTE — PROGRESS NOTES
MEDICAL GENETICS INITIAL VISIT NOTE     Patient full name: Anna Ellis  MRN: [id number]  YOB: 1990   Present during this visit: The patient and , Oliver      Medical history was obtained from the patient. Prior to this appointment, I reviewed medical records from outpatient medical records and scanned documents, if available.     History of present illness:    It was a pleasure to see Ms. Ellis in clinic today. Ms. Ellis is a 35 y.o. female who was referred to Genetics for complex health issues, summarized below.     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 Before that, she had been healthy. After the diagnosis, she had been followed by Rheum and received biologics. Motor skills were affected and she needed to use braces when walking as a child.   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 She has been receiving several treatment for this. She has cataract at L eye s/p lens implant.   -During the past two years, she has recurrent fever, confirmed by a thermometer. Fever is higher than 100F and could be 103/104F. Associated symptoms include sore throat, joint pain, myalgia. No conjunctivitis. She has been taking colchicine, biologics, and steroids for this. Symptoms have improved during the past year. 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 Seen by Vascular Surgery and there is a low suspicion for giant cell arteritis. She has drenching night sweat and underwent a bone marrow biopsy, which came back negative for malignancy.  - In 2015, there were erythematous lesions at legs, not biopsied, and she was diagnosed with Henoch-Schönlein Purpura.  -She has Raynaud phenomenon and there is a documentation of psoriasis.     2) Symptoms of low immune system:  -Reports recurrent infections including URI, chronic sinusitis, ear infections when young, Staphylococcal infections a few times, kidney abscess  "once. No endocarditis, meningitis, or osteomyelitis. No formal diagnosis of a specific form of immunodeficiency.     3) Symptoms of inherited connective tissue disorders as documented below. She was told she has \"EDS\".   Musculoskeletal  - Hypermobility: Yes  - Joint laxity: Yes  - Joint dislocation: Yes  - Tendon/muscle rupture:  No. H/o tear of meniscus s/p sx.   - Fractures: Yes, proportionate to the degree of trauma (sport-related), at fingers and toes.   - Scoliosis: No  - Pectus differences: No  - Flat feet: Yes     Skin  - Spontaneous skin separation: No but reports some skin fragility.   - Easy bruising: Yes  - Skin hyperextensibility: No  - Stretch marks not related to weight change: Yes  - Atrophic scar: Yes  - Wound dehiscence: Yes at L knee  - Early-onset varicose veins: No     Dental  - Reports several dental issues. \"Enamel is thing or does not exist\" and premature loss of one adult tooth.   - Dental crowding: Yes s/p palate expander.   - Gingival disease: Yes    Cardiopulmonary:  - Orthostatic intolerance: Yes, was diagnosed with POTS. She has had one syncopal episode.   - Pneumothorax: No  - Echocardiogram: Yes, most recent 2/19/2024   - Spontaneous rupture/aneurysm/dissection of blood vessels: No    GI/  - IBS-like symptoms: Yes. There seems to be a dysmotility issues.  - Hernia: Yes, hiatal hernia. Umbilical hernia was identified on imaging study.   - Rectal prolapse: No  - Pelvic/uterine prolapse: No. Reports history of pelvic floor dysfunction.   - Spontaneous rupture of internal organs: Yes. During deliver (9/2016 at 25 yo), she developed uterine rupture after several hours of labor, requiring blood transfusions. She is s/p hysterectomy.     Ocular/ENT  - Uveitis since 1   - Lens dislocation:  No  - High myopia:  No  - Hearing loss:  No    4) Chronic kidney disease:  Recently, she was found to have one kidney stone at the L kidney s/p urologic procedures. She developed renal dysfunction and " "renal abscess afterward (2/2024). Her creatinine has not been at baseline and there is a documented history of CKD in chart.     5) Other medical diagnoses/symptoms:  -Childhood asthma, HTN  -Pediatric history: No intellectual/learning disability. Healthy prior to the dx of JRA at 1. Needed braces due to motor skill concerns when she was young.   -Reports that L leg is longer than R leg.     PSH - hysterectomy, several musculoskeletal surgeries, tonsil/adenoid sx, CCY, ureter sx for a stone.      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  Lives with family.     Family history:  Four-generation family tree was obtained and scanned to chart, under \"Genetics\" folder.  Pertinent history      No other family members with joint hypermobility/dislocation, sudden unexplained death, aortopathy, vascular dissection/aneurysm, ectopia lentis, rupture of internal organs, early deafness/blindness, or spontaneous pneumothorax.     Paternal side:  Maternal side:  [employer]: Denied  Consanguinity: Denied        Physical examination:  Arm span to height ratio: 157:158 ~1  Upper segment to lower segment ratio: 72:85 = 0.85  GENERAL: The patient is alert, in no apparent distress.  Head shape is normal.  Face: No malar hypoplasia. Forehead, nose, philthrum, and chin appear normal.  Eyes: Not deep set. Palpebral fissures normally positioned. Sclerae not blue or icteric. PERRLA. No iridodonesis.  Ears: Not dysplastic, posteriorly rotated, or low set.  Oral cavity: No high arched palate. No dental crowding. One tooth is missing. Normal uvula. No gingivitis.  CHEST/LUNGS: No pectus differences. Lungs are clear bilaterally without rhonchi, rales, or wheezes.  HEART: RRR, no R/M/G.  ABDOMEN: No abdominal wall defect.   EXTREMITIES/Back: No arachnodactyly. Wrist signs negative. Thumb signs negative. Bilateral piezogenic papules+. Acrogeria-. No joint contractures. No pretibial plaque. Hindfoot deformity+, flat feet+, " scoliosis- by forward bending test. No peripheral edema. No varicose veins.   SKIN: Stretch marks+. Skin hyperextensibility-, no bruising, soft skin consistency+. Not translucent. Scars are atrophic (not mild) at legs and abdomen.   NEUROLOGIC: EOMI without nystagmus. No ptosis. No facial palsy. Tongue in midline. No dysarthria. Normal gait without abnormal movement.   Psychiatric: Cooperative. Appropriate mood and affect.     Beighton score for generalized joint hypermobility  1. Passive dorsiflexion and hyperextension of the fifth MCP joint beyond 90°: 2  2. Passive apposition of the thumb to the flexor aspect of the forearm: 2  3. Passive hyperextension of the elbow beyond 10°:  2  4. Passive hyperextension of the knee beyond 10°:  0  5. Active forward flexion of the trunk with the knees fully extended so that the palms of the hands rest flat on the floor:  1  Total  7/9 (where a score of equal to or more than  5 is considered positive for age)     Systemic score for Marfan syndrome is  4 (hindfoot, pes planus, striae) where a score of equal to or more than 7 is considered positive systemic score.     Results:    CBC  - ok  Last BUN/Cr 22/1.3  UA - ok  Urine protein - ok    CT A/P 1/25   FINDINGS:   Lower Chest: The lung bases are clear.     Organs: The gallbladder has been surgically removed.  The liver, spleen, pancreas and adrenal glands appear unremarkable for a non contrasted study. There are punctate parenchymal calcifications involving the left kidney   measuring less than 2 mm.  No hydronephrosis is seen.  No ureteral or bladder calculi are noted.     GI/Bowel: Evaluation of the bowel is limited as no enteric contrast was given.  No dilated loops of bowel are seen. I do not see a dilated appendix.     Pelvis: No pelvic fluid collections are seen.  There is a left ovarian cyst measuring 4.8 x 3.9 cm.     Peritoneum/Retroperitoneum: The abdominal aorta is not aneurysmal. There are shotty mesenteric and  retroperitoneal lymph nodes but no retroperitoneal or mesenteric lymphadenopathy is seen.     Bones/Soft Tissues: No acute bony abnormalities are noted. There are shotty inguinal lymph nodes noted.There is a small fat containing umbilical hernia.     Impression    1. No acute intra-abdominal or pelvic abnormality with limitations for a  noncontrast CT scan.  2. No obstructive uropathy.  3. Left ovarian cyst measuring 4.8 x 3.9 cm.  4. Small fat containing umbilical hernia.    CT A/P 11/2023  Impression    There is moderate left hydronephrosis.  About 6.6 cm inferior to left UPJ, there is obstructing calculus measuring maximal 7.8 mm in diameter and 15.9 mm craniocaudad.  No additional ureteric calculus.     Echo 2/19/24 - ok, no aortopathy, MVP, BAV    DANA 4/2024 - wnl    MRI/A brain 2/24 - ok    Assessment:  Ms. Ellis is a 35 y.o. female with complex health issues as summarized in the HPI section. Her symptoms can be broadly grouped into two phenotypes.     First, she has several features of inborn errors of immunity including periodic fever responsive to immunosuppressants, autoimmune disorders, one of which being early-onset joint issues diagnosed as juvenile rheumatoid arthritis, a diagnosis of HSP which is a vasculitis, and recurrent infections suggestive of possible immunodeficiency.     Second, she has several features of inherited connective tissue disorders on exam. Notably, she had one episode of spontaneous rupture of the uterus while in labor. This suggests certain connective tissue disorders such as Loeys-Lorena syndrome, vascular Parish-Danlos syndrome, and classical-like EDS.     There are other issues that could be caused by a monogenic disorder. For example, early-onset kidney stone could be caused by familial hyperoxaluria.      Given a broad genetic differential diagnoses, I recommend whole genome sequencing.     WGS assesses all genetic materials including the approximately 20,000 genes in the  genome. It is a powerful diagnostic tool, providing a definitive diagnosis in 20-50% of patients, depending on the phenotype. It evaluates all types of variants, including exonic variants, intronic variants, mitochondrial variants, copy number variants, and short tandem repeats. We reviewed [facility] Secondary Findings and Ms. Ellis would like to OPT IN receiving the findings, acknowledging that it may have implications for life insurance, long-term disability insurance, and nursing home insurance in the future. If negative, WGS re-analysis is an option that would help increase diagnostic yield. I reviewed that obtaining trio WGS (paternal and/or maternal samples included) results in a higher diagnostic yield than proband-only WGS.    I reviewed that several patients in whom we have a suspicion for a genetic condition do not have a molecular diagnosis, and it is important to see providers for symptomatic management. For example, if WGS is negative, she will have a clinical diagnosis of hypermobility spectrum disorder (HSD) and seeing PM&R and/or PT for management of symptomatic joint hypermobility is recommended. Similarly, there are patients without a genetic diagnosis but clinical symptoms suggestive of periodic fever syndrome. Theses patients have clinical response to colchicine. She therefore should continue seeing Rheumatology.     Benefits of having genetic test include 1) to establish a molecular diagnosis; 2) to provide further medical recommendations specific to each diagnosis; 3) for familial cascade testing, recurrence risk estimation, and family planning; and 4) to allow for participation in support group organizations and enrolment in clinical trials, if any.  Pretest genetic counseling was provided, including the nature of the test; three types of test result (positive, negative, and uncertain); the fact that a negative result does not exclude a possibility of genetic disorders; RADHA, possibility of  identifying consanguinity and non-paternity; and retention of de-identified genetic data at the testing company. The patient provided a verbal informed consent.     Plan:  Trio WGS via [employer]. To investigate insurance coverage. Sample: buccal swab/saliva obtained today.     RTC 3.5 mo    Thank you for allowing me to participate in the care of this patient. Please do not hesitate to contact me if you have any questions.   Sincerely,     Catrina Villaseñor MD    Medical Geneticist  [facility]  Phone: [phone]  Fax: [phone]   Address: [address] [place] [address]  Time spent (this information is required by insurance): face-to-face 70min; preparation 5min; documentation 30min; total time spent 105min

## 2025-07-15 ENCOUNTER — APPOINTMENT (OUTPATIENT)
Dept: GENETICS | Facility: CLINIC | Age: 35
End: 2025-07-15
Payer: COMMERCIAL

## 2025-07-15 DIAGNOSIS — S37.69XD RUPTURE OF UTERUS, SUBSEQUENT ENCOUNTER: Primary | ICD-10-CM

## 2025-07-15 DIAGNOSIS — M35.7 HYPERMOBILITY SYNDROME: ICD-10-CM

## 2025-07-15 DIAGNOSIS — M04.1 PERIODIC FEVER (MULTI): ICD-10-CM

## 2025-07-15 DIAGNOSIS — B99.9 RECURRENT INFECTIONS: ICD-10-CM

## 2025-07-15 DIAGNOSIS — G90.A POTS (POSTURAL ORTHOSTATIC TACHYCARDIA SYNDROME): ICD-10-CM

## 2025-07-15 NOTE — PROGRESS NOTES
MEDICAL GENETICS FOLLOW-UP VISIT NOTE    Patient full name: Anna Ellis  MRN: [id number]  YOB: 1990  Present during this visit: The patient       Medical history was obtained from the patient. Prior to this appointment, I reviewed medical records from outpatient medical records and scanned documents, if available. This visit was completed via televideo. All issues as below were discussed and addressed but no physical exam was performed. If it was felt that the patient should be evaluated in clinic then they were directed there. The patient consented to visit.    Interval history:  Ms. Ellis is a 35 y.o. female who returned to Genetics clinic for complex health issues. We obtained trio whole genome sequencing via [employer] which came back negative.     After the last visit, she met with Endocrinology and was diagnosed with hyperparathyroidism. There is a plan for parathyroidectomy.    From my first consultation note 4/4/2025:  Ms. Ellis is a 35 y.o. female who was referred to Genetics for complex health issues, summarized below.      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 Before that, she had been healthy. After the diagnosis, she had been followed by Rheum and received biologics. Motor skills were affected and she needed to use braces when walking as a child.   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 She has been receiving several treatment for this. She has cataract at L eye s/p lens implant.   -During the past two years, she has recurrent fever, confirmed by a thermometer. Fever is higher than 100F and could be 103/104F. Associated symptoms include sore throat, joint pain, myalgia. No conjunctivitis. She has been taking colchicine, biologics, and steroids for this. Symptoms have improved during the past year. 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 Seen by Vascular Surgery and there is a low  "suspicion for giant cell arteritis. She has drenching night sweat and underwent a bone marrow biopsy, which came back negative for malignancy.  - In 2015, there were erythematous lesions at legs, not biopsied, and she was diagnosed with Henoch-Schönlein Purpura.  -She has Raynaud phenomenon and there is a documentation of psoriasis.      2) Symptoms of low immune system:  -Reports recurrent infections including URI, chronic sinusitis, ear infections when young, Staphylococcal infections a few times, kidney abscess once. No endocarditis, meningitis, or osteomyelitis. No formal diagnosis of a specific form of immunodeficiency.      3) Symptoms of inherited connective tissue disorders as documented below. She was told she has \"EDS\".   Musculoskeletal  - Hypermobility: Yes  - Joint laxity: Yes  - Joint dislocation: Yes  - Tendon/muscle rupture:  No. H/o tear of meniscus s/p sx.   - Fractures: Yes, proportionate to the degree of trauma (sport-related), at fingers and toes.   - Scoliosis: No  - Pectus differences: No  - Flat feet: Yes     Skin  - Spontaneous skin separation: No but reports some skin fragility.   - Easy bruising: Yes  - Skin hyperextensibility: No  - Stretch marks not related to weight change: Yes  - Atrophic scar: Yes  - Wound dehiscence: Yes at L knee  - Early-onset varicose veins: No     Dental  - Reports several dental issues. \"Enamel is thing or does not exist\" and premature loss of one adult tooth.   - Dental crowding: Yes s/p palate expander.   - Gingival disease: Yes     Cardiopulmonary:  - Orthostatic intolerance: Yes, was diagnosed with POTS. She has had one syncopal episode.   - Pneumothorax: No  - Echocardiogram: Yes, most recent 2/19/2024   - Spontaneous rupture/aneurysm/dissection of blood vessels: No     GI/  - IBS-like symptoms: Yes. There seems to be a dysmotility issues.  - Hernia: Yes, hiatal hernia. Umbilical hernia was identified on imaging study.   - Rectal prolapse: No  - " Pelvic/uterine prolapse: No. Reports history of pelvic floor dysfunction.   - Spontaneous rupture of internal organs: Yes. During deliver (9/2016 at 25 yo), she developed uterine rupture after several hours of labor, requiring blood transfusions. She is s/p hysterectomy.      Ocular/ENT  - Uveitis since 1   - Lens dislocation:  No  - High myopia:  No  - Hearing loss:  No     4) Chronic kidney disease:  Recently, she was found to have one kidney stone at the L kidney s/p urologic procedures. She developed renal dysfunction and renal abscess afterward (2/2024). Her creatinine has not been at baseline and there is a documented history of CKD in chart.      5) Other medical diagnoses/symptoms:  -Childhood asthma, HTN  -Pediatric history: No intellectual/learning disability. Healthy prior to the dx of JRA at 1. Needed braces due to motor skill concerns when she was young.   -Reports that L leg is longer than R leg.      PSH - hysterectomy, several musculoskeletal surgeries, tonsil/adenoid sx, CCY, ureter sx for a stone.       ROS:  List of symptoms provided by the patient after the visit:  General/Constitutional:   * Daily pain (5/10, occasionally 8+/10)   * Fatigue, especially after activity (2-3 days of severe symptoms)   * Poor sleep (unrefreshing, difficulty falling asleep)   * Impact on work, grad school, and mental well-being     Neurological:   * Headaches (worse in the morning and evening)   * Cognitive dysfunction (brain fog, forgetfulness, word-finding difficulties, difficulty focusing)   * Coordination problems (running into things)   * Cervical spasms (neck pain with shock-like sensations down the spine)     Cardiovascular:   * Spikes in heart rate (especially in the morning)   * Petechiae (including from blood pressure cuff)     Musculoskeletal:   * Stiff joints and painful muscles (feeling bruised/tender all over)   * Rib pain (sharp or dull ache, worse with breathing)   * Spine, sternum, and hip pain  "(squeezing/pushing sensation)   * Joint pain (Alvarez horse/vice  sensation with burning)   * Armpit and neck pain (bulge-like sensation, no palpable mass)     * Nausea and dry heaving (worse in the morning)   * Appetite changes (poor appetite, early satiety)   * Stomach tightness and bloating   * Alternating constipation and diarrhea   *Malabsorption and malnutrition   * inability to lose weight      Dermatological:   * Sensitive scalp   * \"Writing\" on skin (redness that persists)   * Hair breakage and loss     Ear, Nose, and Throat (ENT):   * Watery eyes   * Random nasal drip   * Increased earwax accumulation   * Painful nose and ear cartilage     Genitourinary:   * Painful arousal and intercourse   * Low libido     Potential Malabsorption and Malnutrition Indicators   * Alternating constipation and diarrhea.   * Poor appetite.   * Early satiety.   * Bloating.   * Fatigue.   * Hair loss.     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  Lives with family.      Family history:  Four-generation family tree was obtained and scanned to chart, under \"Genetics\" folder.  Pertinent history      No other family members with joint hypermobility/dislocation, sudden unexplained death, aortopathy, vascular dissection/aneurysm, ectopia lentis, rupture of internal organs, early deafness/blindness, or spontaneous pneumothorax.     Paternal side:  Maternal side:  Ashkenazi [employer]: Denied  Consanguinity: Denied        Physical examination:  Arm span to height ratio: 157:158 ~1  Upper segment to lower segment ratio: 72:85 = 0.85  GENERAL: The patient is alert, in no apparent distress.  Head shape is normal.  Face: No malar hypoplasia. Forehead, nose, philthrum, and chin appear normal.  Eyes: Not deep set. Palpebral fissures normally positioned. Sclerae not blue or icteric. PERRLA. No iridodonesis.  Ears: Not dysplastic, posteriorly rotated, or low set.  Oral cavity: No high arched palate. No dental crowding. One tooth " is missing. Normal uvula. No gingivitis.  CHEST/LUNGS: No pectus differences. Lungs are clear bilaterally without rhonchi, rales, or wheezes.  HEART: RRR, no R/M/G.  ABDOMEN: No abdominal wall defect.   EXTREMITIES/Back: No arachnodactyly. Wrist signs negative. Thumb signs negative. Bilateral piezogenic papules+. Acrogeria-. No joint contractures. No pretibial plaque. Hindfoot deformity+, flat feet+, scoliosis- by forward bending test. No peripheral edema. No varicose veins.   SKIN: Stretch marks+. Skin hyperextensibility-, no bruising, soft skin consistency+. Not translucent. Scars are atrophic (not mild) at legs and abdomen.   NEUROLOGIC: EOMI without nystagmus. No ptosis. No facial palsy. Tongue in midline. No dysarthria. Normal gait without abnormal movement.   Psychiatric: Cooperative. Appropriate mood and affect.     Beighton score for generalized joint hypermobility  1. Passive dorsiflexion and hyperextension of the fifth MCP joint beyond 90°: 2  2. Passive apposition of the thumb to the flexor aspect of the forearm: 2  3. Passive hyperextension of the elbow beyond 10°:  2  4. Passive hyperextension of the knee beyond 10°:  0  5. Active forward flexion of the trunk with the knees fully extended so that the palms of the hands rest flat on the floor:  1  Total  7/9 (where a score of equal to or more than  5 is considered positive for age)     Systemic score for Marfan syndrome is  4 (hindfoot, pes planus, striae) where a score of equal to or more than 7 is considered positive systemic score.     Results:    Genetic testing  Variantyx trio whole genome sequencing - negative. Report date 7/3/2025.  [facility] Secondary Findings - negative    CBC  - ok  Last BUN/Cr 22/1.3  UA - ok  Urine protein - ok     CT A/P 1/25   FINDINGS:   Lower Chest: The lung bases are clear.     Organs: The gallbladder has been surgically removed.  The liver, spleen, pancreas and adrenal glands appear unremarkable for a non contrasted study.  There are punctate parenchymal calcifications involving the left kidney   measuring less than 2 mm.  No hydronephrosis is seen.  No ureteral or bladder calculi are noted.     GI/Bowel: Evaluation of the bowel is limited as no enteric contrast was given.  No dilated loops of bowel are seen. I do not see a dilated appendix.     Pelvis: No pelvic fluid collections are seen.  There is a left ovarian cyst measuring 4.8 x 3.9 cm.     Peritoneum/Retroperitoneum: The abdominal aorta is not aneurysmal. There are shotty mesenteric and retroperitoneal lymph nodes but no retroperitoneal or mesenteric lymphadenopathy is seen.     Bones/Soft Tissues: No acute bony abnormalities are noted. There are shotty inguinal lymph nodes noted.There is a small fat containing umbilical hernia.      Impression     1. No acute intra-abdominal or pelvic abnormality with limitations for a  noncontrast CT scan.  2. No obstructive uropathy.  3. Left ovarian cyst measuring 4.8 x 3.9 cm.  4. Small fat containing umbilical hernia.     CT A/P 11/2023  Impression     There is moderate left hydronephrosis.  About 6.6 cm inferior to left UPJ, there is obstructing calculus measuring maximal 7.8 mm in diameter and 15.9 mm craniocaudad.  No additional ureteric calculus.      Echo 2/19/24 - ok, no aortopathy, MVP, BAV     DANA 4/2024 - wnl     MRI/A brain 2/24 - ok     Assessment:  Ms. Elils is a 35 y.o. female with complex health issues as summarized in the HPI section. Trio Whole Genome Sequencing (WGS) came back negative without any pathogenic variants. Her symptoms can be broadly grouped into two phenotypes.     First, she has several features of inborn errors of immunity including periodic fever responsive to immunosuppressants, autoimmune disorders, one of which being early-onset joint issues diagnosed as juvenile rheumatoid arthritis, a diagnosis of HSP which is a vasculitis, and recurrent infections suggestive of possible immunodeficiency.     Many  individuals with these features do not have an identifiable genetic mutation. Some individuals have a clinical diagnosis of periodic fever syndrome and symptoms are responsive to colchicine. She would like to transfer care to [facility]. I will refer her to a rheumatologist whose clinical interests are periodic fever syndrome. Also, she has recurrent infections and has not been evaluated by Immunology. I will refer her to Immunology; some of these issues are treatable, such as with IVIg.     Second, she has several features of inherited connective tissue disorders on exam. Notably, she had one episode of spontaneous rupture of the uterus while in labor. Genetic testing did not identify a variant that could cause vascular EDS or Loeys-Lorena syndrome. Clinically, she has hypermobility spectrum disorder (HSD).     HSD is a diagnosis when an individual does not fulfill the strict 2017 diagnostic criteria for hypermobile Parish-Danlos syndrome (hEDS) and does not have clinical (or molecular) features suggestive of other hereditary connective tissue disorders. Ms. Ellis does not fulfill the strict diagnostic criteria for hEDS (scanned in chart). The management of HSD and hEDS is similar and individuals with HSD may later fulfill the hEDS criteria. HSD/hEDS is the most common inherited connective tissue disorder and is the only subtype among 13 EDS subtypes that does not have a testable gene.     Management of individuals with HSD is symptom-based to address each manifestation that may occur. Pain related to hypermobility may be due to tendinopathy, accelerated joint degeneration, myofascial restrictions, nerve entrapment and neuropathic/sensitized nerves among other causes. Patients with hypermobility often present with other comorbidities such as functional GI issues, chronic headaches from various causes such as migraines, urinary incontinence, pelvic floor dysfunction, mental health issues, mast cell activation syndrome,  "autonomic nervous system dysfunction (such as POTS), TMJ issues, resistance to local anesthesia, and small fiber neuropathy.     Individuals with joint hypermobility are encouraged to remain active to maintain good muscle tone. Muscle tone can help compensate for the lax tendons and ligaments that do not adequately support the joints. I encourage individuals with joint hypermobility to work with the physical therapist and/or PM&R on exercises designed for toning and strengthening. Aqua therapy also can be very helpful.    Individuals with joint hypermobility may find relief from supplementing with vitamin D and vitamin C. A deficiency in vitamin D can result in bone pain. Many individuals in [facility] are deficient in vitamin D due to the high number of cloudy days that we have. Vitamin C is a cofactor for collagen production. Many connective tissue problems result from abnormalities of collagen. There is some thought that by maximizing vitamin C intake, collagen production can be optimized to help connective tissue function. If there is no clinical improvement in 2 months, discontinuation of vitamin C is reasonable. Soaking in Epsom salt, which contain magnesium, also can be helpful for management of joint pain.    Individuals with HSD may present with various orthopedic issues with higher rate of postoperative complications. From recent review, recommendations include avoidance of surgery, if possible, and pre- and post-operative planning as well as rehabilitation by a multidisciplinary team knowledgeable of the risks and benefits of surgeries in this patient population (PMIDs:  94211947, [id number]).     I reviewed that a next-generation sequencing panel, sometimes, has higher sensitivity than WGS in detecting a pathogenic variant. This is due to the \"sequencing depth\" of the technique used. If a clinical suspicion remains high, we could consider getting a gene panel to rule out vEDS and LDS. These two " diagnoses could cause spontaneous uterine rupture. Ms. Ellis is interested in this. I will place an order. If negative, the diagnosis remains HSD and the cause of the uterine rupture is unknown, but prolonged labor likely plays a role.     There is no variant that could explain hyperparathyroidism. The lab specifically analyzed genes known to cause hyperparathyroidism such as MEN1 and RET. I reviewed that 35% of individuals with MEN1 and 2% of individuals with MEN2A have a clinical diagnosis without any identifiable mutation. I will defer to her endocrinologist on further workups for MEN1/MEN2, if clinically indicated.     I discussed negative Secondary Findings. The report contains some pharmacogenetic findings. The patient can discuss these findings with the prescribers of the medications where the variant is detected, or they may consult the [facility].     It is possible that she has a genetic condition that has not been discovered. It is possible that she has a genetic condition that could not be detected by current genetic testing technology. If is also possible that she does not have any medical condition that is caused by a mutation in one gene (single-gene disorders). I recommend that we obtain WGS re-analysis after 2 years, or sooner if new phenotypes emerge. From a recent study, obtaining re-analysis of a broad genetic test has resulted in 10% additional diagnostic yield if obtained at least 2 years after the initial negative test.     Plan:  -For immune system issues:  o Referrals to [facility] placed. I will reach out to Rheum and inquire who is an appropriate provider to see her. Further workups and treatment per Rheum. No additional genetic testing recommended.   -For hypermobility spectrum disorder:  o Additional genetic testing -- to order a gene panel (given its higher sensitivity than WGS in detecting certain variants) to rule out vEDS and LDS given history  of uterine rupture. Insurance billing. Will investigate OOP. To call if negative.   o If additional genetic testing for vEDS/LDS is negative, the diagnosis is HSD.   o In adults with HSD and normal aortic root on echocardiogram, no further monitoring is needed unless other indications are present (PMID: 13056669).   o Resource: The Parish-Danlos Society [email or web address]://www.parish-[email or web address]/  o Information for primary care provider: [email or web address]  o Musculoskeletal issues:  - Management is symptomatic by PT, PM&R, pain medicine, and/or Orthopedics. She will see a local PT and/or PM&R.   - Avoid contact sports and intense resistance exercise (such as weight lifting)  - Low-resistance exercise to improve muscle tone such as walking, bicycling, low-impact aerobics, water exercise, and simple range-of-motion exercise without added resistance.  - Swimming (or aquatic therapy) and pilates  o Other specialists according to symptoms such as gastroenterology and neurology  o Since she has a diagnosis of POTS, could see Dr. Huang in [place] or Dr. Roth at [facility]. A referral placed.   o A referral to Razia Griffin, OT placed for management of fatigue and brain fog.   -For hyperparathyroidism: Workups for MEN1/MEN2 per Endocrinology, if indicated. Some individuals have a clinical diagnosis without an identifiable mutation.   -Consider re-analysis of WGS after 2 years. Could see me sooner if new health issues arise.     Catrina Villaseñor MD    Medical Geneticist  [facility]  Phone: [phone]  Fax: [phone]   Address: [address] [place] [address]  Time spent (this information is required by insurance): face-to-face 70min; preparation 5min; documentation 25min; placing order(s) for genetic testing 5min; total time spent 105min

## 2025-07-17 ENCOUNTER — TELEPHONE (OUTPATIENT)
Dept: GENETICS | Facility: CLINIC | Age: 35
End: 2025-07-17
Payer: COMMERCIAL

## 2025-07-17 NOTE — TELEPHONE ENCOUNTER
Called the patient on behalf of Dr. Fritz, and left a voicemail. Directed patient to call us back.

## 2025-08-27 ENCOUNTER — TELEPHONE (OUTPATIENT)
Dept: GENETICS | Facility: CLINIC | Age: 35
End: 2025-08-27
Payer: COMMERCIAL

## (undated) DEVICE — YANKAUER,BULB TIP,W/O VENT,RIGID,STERILE: Brand: MEDLINE

## (undated) DEVICE — GLOVE ORANGE PI 7   MSG9070

## (undated) DEVICE — SPHINCTEROTOME: Brand: JAGTOME RX 39

## (undated) DEVICE — BANDAGE ADH W0.75XL3IN NAT PLAS CURAD

## (undated) DEVICE — KIT DRP 3 ARM ACC DISP ENDOWRIST DA VINCI SI

## (undated) DEVICE — DISPOSABLE LAPAROSCOPIC CORDS, 1 PER POUCH: Brand: A&E MEDICAL / DISPOSABLE LAPAROSCOPIC CORDS

## (undated) DEVICE — COVER,TABLE,60X90,STERILE: Brand: MEDLINE

## (undated) DEVICE — TRAY NRV BLK SUPP CUST

## (undated) DEVICE — APPLICATOR MEDICATED 10.5 CC SOLUTION HI LT ORNG CHLORAPREP

## (undated) DEVICE — GOWN,AURORA,NONREINFORCED,LARGE: Brand: MEDLINE

## (undated) DEVICE — MARKER,SKIN,WI/RULER AND LABELS: Brand: MEDLINE

## (undated) DEVICE — SINGLE ACTION PUMPING SYSTEM

## (undated) DEVICE — TOWEL,OR,DSP,ST,NATURAL,DLX,4/PK,20PK/CS: Brand: MEDLINE

## (undated) DEVICE — SHEARS ENDOSCP L36CM DIA5MM ULTRASONIC CRV TIP ADAPTIVE

## (undated) DEVICE — RETRIEVAL BALLOON CATHETER: Brand: EXTRACTOR™ PRO RX

## (undated) DEVICE — COVER,MAYO STAND,XL,STERILE: Brand: MEDLINE

## (undated) DEVICE — ST CHARLES GEN LAPAROSCOPY PK: Brand: MEDLINE INDUSTRIES, INC.

## (undated) DEVICE — STRAP,POSITIONING,KNEE/BODY,FOAM,4X60": Brand: MEDLINE

## (undated) DEVICE — SUTURE MCRYL + SZ 4-0 L27IN ABSRB UD L19MM PS-2 3/8 CIR MCP426H

## (undated) DEVICE — SOLUTION ANTIFOG VIS SYS CLEARIFY LAPSCP

## (undated) DEVICE — 3M™ WARMING BLANKET, UPPER BODY, 10 PER CASE, 42268: Brand: BAIR HUGGER™

## (undated) DEVICE — GLOVE ORANGE PI 7 1/2   MSG9075

## (undated) DEVICE — AIRLIFE™ NASAL OXYGEN CANNULA CURVED, FLARED TIP, WITH 7 FEET (2.1 M) CRUSH RESISTANT TUBING, OVER-THE-EAR STYLE: Brand: AIRLIFE™

## (undated) DEVICE — BITEBLOCK 54FR W/ DENT RIM BLOX

## (undated) DEVICE — SUTURE PDS II SZ 0 L27IN ABSRB VLT UR-6 L26MM 1/2 CIR D7185

## (undated) DEVICE — DUAL LUMEN URETERAL CATHETER

## (undated) DEVICE — PACK PROCEDURE SURG CYSTO SVMMC LF

## (undated) DEVICE — 3M™ STERI-STRIP™ WOUND CLOSURE SYSTEMS 5 EACH/PACK 25 PACKS/CARTON 4 CARTONS/CASE W8516: Brand: 3M™ STERI-STRIP™

## (undated) DEVICE — GLOVE ORANGE PI 8   MSG9080

## (undated) DEVICE — 3M™ TEGADERM™ TRANSPARENT FILM DRESSING FRAME STYLE, 1628, 6 IN X 8 IN (15 CM X 20 CM), 10/CT 8CT/CASE: Brand: 3M™ TEGADERM™

## (undated) DEVICE — FORCEPS BX L240CM WRK CHN 2.8MM STD CAP W/ NDL MIC MESH

## (undated) DEVICE — Z INACTIVE USE 2653177 SPONGE GZ W2XL2IN NONWOVEN 4 PLY FASTER WICKING ABIL AVANT

## (undated) DEVICE — NEEDLE SPNL 22GA L3.5IN BLK HUB S STL REG WALL FIT STYL W/

## (undated) DEVICE — 1200CC GUARDIAN II: Brand: GUARDIAN

## (undated) DEVICE — STRAP ARMBRD W1.5XL32IN FOAM STR YET SFT W/ HK AND LOOP

## (undated) DEVICE — GUIDEWIRE URO L150CM DIA .035IN STIFF NIT HYDRPHL STR TIP

## (undated) DEVICE — TUBING, SUCTION, 3/16" X 10', STRAIGHT: Brand: MEDLINE

## (undated) DEVICE — NEEDLE SFTY RETRCT ST 25GAX1IN INTEGRA

## (undated) DEVICE — SOLUTION IRRIG 1000ML 0.9% SOD CHL USP POUR PLAS BTL

## (undated) DEVICE — ADAPTER URETSCP Y TYP ROT M LUER CONN TUOHY BORST VLV BLU

## (undated) DEVICE — DRAPE,REIN 53X77,STERILE: Brand: MEDLINE

## (undated) DEVICE — TROCARS: Brand: KII® BALLOON BLUNT TIP SYSTEM

## (undated) DEVICE — TROCAR: Brand: KII FIOS FIRST ENTRY

## (undated) DEVICE — MHPB CYSTO PACK-LF: Brand: MEDLINE INDUSTRIES, INC.

## (undated) DEVICE — 20 ML SYRINGE REGULAR TIP: Brand: MONOJECT

## (undated) DEVICE — SYSTEM BX CAP BILI RAP EXCHG CAP LOK DEV COMPATIBLE W/ OLY

## (undated) DEVICE — SOLUTION IV IRRIG WATER 1000ML POUR BRL 2F7114

## (undated) DEVICE — MASTISOL ADHESIVE AMPULE

## (undated) DEVICE — Z DISCONTINUED GLOVE SURG SZ 7.5 L12IN FNGR THK13MIL WHT ISOLEX

## (undated) DEVICE — TUBE ET DIA7.5MM ORAL NSL CUF MURPHY EYE HI LO RADPQ LN

## (undated) DEVICE — GLOVE ORTHO 7 1/2   MSG9475

## (undated) DEVICE — OBTURATOR ROBOTIC DIA8MM BLDELSS ENDOSCP DISP DA VINCI SI

## (undated) DEVICE — Z INACTIVE USE 2641839 CLIP INT M L POLYMER LOK LIG HEM O LOK

## (undated) DEVICE — Z DUP USE 2641840 CLIP INT L POLYMER LOK LIG HEM O LOK

## (undated) DEVICE — ADAPTER URO SCP UROLOK LL

## (undated) DEVICE — PENCIL ES L3M BTTN SWCH HOLSTER W/ BLDE ELECTRD EDGE

## (undated) DEVICE — NEEDLE FLTR 18GA L1.5IN MEM THK5UM BLNT DISP

## (undated) DEVICE — SYSTEM FLD COLL W/ FLX DRP SUPP AND DISP BG

## (undated) DEVICE — LIEBERMAN INTRODUCER: Brand: LIEBERMAN

## (undated) DEVICE — FLEXIVA  PULSE  AND  FLEXIVA  PULSE  TRACTIP  LASER  FIBERS  ARE  HIGH  POWER  SINGLE-USE FIBER: Brand: FLEXIVA PULSE